# Patient Record
Sex: FEMALE | Race: WHITE | Employment: FULL TIME | URBAN - METROPOLITAN AREA
[De-identification: names, ages, dates, MRNs, and addresses within clinical notes are randomized per-mention and may not be internally consistent; named-entity substitution may affect disease eponyms.]

---

## 2017-01-25 ENCOUNTER — OFFICE VISIT (OUTPATIENT)
Dept: INTERNAL MEDICINE CLINIC | Age: 67
End: 2017-01-25

## 2017-01-25 VITALS
OXYGEN SATURATION: 99 % | SYSTOLIC BLOOD PRESSURE: 134 MMHG | HEIGHT: 63 IN | HEART RATE: 100 BPM | TEMPERATURE: 98 F | WEIGHT: 134 LBS | BODY MASS INDEX: 23.74 KG/M2 | DIASTOLIC BLOOD PRESSURE: 70 MMHG

## 2017-01-25 DIAGNOSIS — J47.9 BRONCHIECTASIS WITHOUT COMPLICATION (HCC): ICD-10-CM

## 2017-01-25 DIAGNOSIS — C50.911 MALIGNANT NEOPLASM OF RIGHT FEMALE BREAST, UNSPECIFIED SITE OF BREAST: ICD-10-CM

## 2017-01-25 DIAGNOSIS — E11.9 CONTROLLED TYPE 2 DIABETES MELLITUS WITHOUT COMPLICATION, WITHOUT LONG-TERM CURRENT USE OF INSULIN (HCC): ICD-10-CM

## 2017-01-25 DIAGNOSIS — Z01.818 PREOP EXAM FOR INTERNAL MEDICINE: ICD-10-CM

## 2017-01-25 DIAGNOSIS — E78.5 HYPERLIPIDEMIA, UNSPECIFIED HYPERLIPIDEMIA TYPE: ICD-10-CM

## 2017-01-25 DIAGNOSIS — J40 BRONCHITIS: Primary | ICD-10-CM

## 2017-01-25 RX ORDER — AZITHROMYCIN 250 MG/1
TABLET, FILM COATED ORAL
Qty: 6 TAB | Refills: 0 | Status: SHIPPED | OUTPATIENT
Start: 2017-01-25 | End: 2017-02-24

## 2017-01-25 NOTE — PROGRESS NOTES
61 y.o. WHITE OR  female who presents for evaluation. No cardiovascular complaints. She has not been going to the gym and is just aiming for 8K-10K on her Fitbit which she hits most days     Denies polyuria, polydipsia, nocturia, vision change. Sugars are in good range when she checks, no hypo episodes. Vitals 1/25/2017 12/13/2016 10/25/2016 8/3/2016 5/10/2016   Weight 134 lb 133 lb 128 lb 9.6 oz 130 lb 130 lb     She had an abnormal mammo late 2016 and US also abn so she subsequently underwent bx late Dec which did confirm breast ca.   She will be undergoing right partial mastectomy by Dr Rodolfo Buchanan mid Feb and she has consult w Dr Silvia Stephen tomorrow for adjuvant xrt    She has had uri sx 2-3 weeks, still coughing up discolored material.  No wheezing, dyspnea, recent f, sinus or ear sx    Past Medical History   Diagnosis Date    ADD (attention deficit disorder) 2014     [facility] psych    Amebic colitis 1970s    Arthritis      s/p cortisone left knee    Bronchiectasis      Dr. Alice Coleman; RLL    FHx: breast cancer     Fibrocystic breast disease      Dr. Joana Ray GERD (gastroesophageal reflux disease)      Dr. Malcom Hartley s/p dilation 2/12    H/O pulmonary function tests 2011     ratio 70, FEV1 81 no change postbd, TLC 93, RV 94, DLCO 81    Hyperlipidemia     Insomnia     Left rotator cuff tear 2000s     conservative therapy    Osteopenia      DEXA t-score -1.2 spine, -0.2 hip (4/08);  spine -1.2, hip 0.1 (10/10);  -1.8 spine, -0.3  (7/13)    Spontaneous pneumothorax 1970s     x3    Type II or unspecified type diabetes mellitus       on basis of elev a1c 9/13    Zoster right S1 3/12     Past Surgical History   Procedure Laterality Date    Pr cardiac surg procedure unlist  4/14     Thallim negative, ef 75%    Hx breast biopsy  4/12     Right Breast biopsy w/needle    Pr breast surgery procedure unlisted  2/11     Left nipple duct exploration and excisional biopsy    Hx colonoscopy        Merle Gilbert negative 2005; diverticulosis 12/13 Dr Kaila Vicente Hx hysterectomy  1993     fibroids/ovarian cysts - Dr. Abhijit Putnam Marital status:      Spouse name: N/A    Number of children: 2    Years of education: N/A     Occupational History   2021 [employer]     Social History Main Topics    Smoking status: Former Smoker     Packs/day: 1.00     Years: 5.00     Quit date: 8/1/1973    Smokeless tobacco: Never Used    Alcohol use No    Drug use: No    Sexual activity: Not on file     Other Topics Concern    Not on file     Social History Narrative     Current Outpatient Prescriptions   Medication Sig    temazepam (RESTORIL) 30 mg capsule TAKE 1 CAPSULE BY MOUTH AT BEDTIME AS NEEDED    metFORMIN ER (GLUCOPHAGE XR) 500 mg tablet Take 2 Tabs by mouth daily (with dinner).  pravastatin (PRAVACHOL) 40 mg tablet Take 1 Tab by mouth nightly.  LORazepam (ATIVAN) 0.5 mg tablet Take  by Mouth.  DEXILANT 60 mg CpDB Take 60 mg by mouth daily.  b complex vitamins (B COMPLEX 1) tablet Take 1 Tab by mouth daily.  Cholecalciferol, Vitamin D3, (VITAMIN D3) 1,000 unit cap Take  by mouth.  aspirin delayed-release 81 mg tablet Take 81 mg by mouth daily.  fluticasone-salmeterol (ADVAIR DISKUS) 250-50 mcg/dose diskus inhaler Take 1 Puff by inhalation every twelve (12) hours.  albuterol (VENTOLIN HFA) 90 mcg/actuation inhaler Take 2 Puffs by inhalation every six (6) hours as needed for Wheezing.  IBUPROFEN (ADVIL PO) Take  by mouth as needed. No current facility-administered medications for this visit.       Allergies   Allergen Reactions    Avelox [Moxifloxacin] Other (comments)     Felt jittery    Crestor [Rosuvastatin] Myalgia    Evista [Raloxifene] Other (comments)     Severe flashes    Lipitor [Atorvastatin] Other (comments)     Leg cramps and muscle aches    Macrobid [Nitrofurantoin Monohyd/M-Cryst] Hives  Neuromuscular Blockers, Steroidal Other (comments)     \"patient feels crazy\"     REVIEW OF SYSTEMS: gyn 2012, mammo 5/16, DEXA 7/13, colo 12/13 Dr Annemarie Nickerson, Dr Taran Luna  Ophtho - no vision change or eye pain  Oral - no mouth pain, tongue or tooth problems  Ears - no hearing loss, ear pain, fullness, no swallowing problems  Cardiac - no CP, PND, orthopnea, edema, palpitations or syncope  Chest - no breast masses  Resp - no wheezing, chronic coughing, dyspnea  GI - no heartburn, nausea, vomiting, change in bowel habits, bleeding, hemorrhoids  Urinary - no dysuria, hematuria, flank pain, urgency, frequency  Constitutional - no wt loss, night sweats, unexplained fevers  Neuro - no focal weakness, numbness, paresthesias, incoordination, ataxia, involuntary movements  Endo - no polyuria, polydipsia, nocturia, hot flashes    Visit Vitals    /70    Pulse 100    Temp 98 °F (36.7 °C) (Oral)    Ht 5' 3\" (1.6 m)    Wt 134 lb (60.8 kg)    SpO2 99%    BMI 23.74 kg/m2   A&O x3  Affect is appropriate. Mood stable  No apparent distress  Anicteric, no JVD, adenopathy or thyromegaly. No carotid bruits or radiated murmur  Lungs clear to auscultation, no wheezes or rales  Heart showed regular rate and rhythm. No murmur, rubs, gallops  Abdomen soft nontender, no hepatosplenomegaly or masses.    Ext without c/c/e, pulses 2+ symmetrically    LABS  From 9/10 showed   gluc 106, cr 0.70, gfr>60, alt 42,                   chol 238, tg 154, hdl 54, ldl-c 153, wbc 6.5, hb 13.1, plt 258, ua neg  From 2/11 showed   gluc 95,   cr 0.90                                                                                                   wbc 7.0, hb 14.1, plt 308  From 3/12 showed   gluc 103, cr 0.90              alt 43,                                                                           wbc 6.2, hb 13.7, plt 285  From 3/13 showed   gluc 130, cr 0.60,             alt 22,                    chol 244, tg 180, hdl 57, ldl-c 151, ua neg, tsh 1.79  From 9/13 showed   gluc 119, cr 0.70, gfr>60,            hba1c 6.6, chol 228, tg 145, hdl 62, ldl-c 137, 2hr   From 2/14 showed   gluc 95,   cr 0.60, gfr>61, alt 20, hba1c 5.7,                    umar 2.8  From 4/14 showed   gluc 144, cr 0.98, gfr>60, alt 31,          wbc 7.6, hb 13.4, plt 317, ck/trop neg  From 10/14 showed gluc 98,   cr 0.60, gfr>60, alt 20,          chol 163, tg 96,   hdl 64, ldl-c 80  From 4/15 showed   gluc 96,   cr 0.70, gfr>60, alt 19, hba1c 6.1, chol 207, tg 126, hdl 68, ldl-c 114  From 10/15 showed        hba1c 6.3, chol 188, tg 118, hdl 66, ldl-c 98,   wbc 5.3, hb 13.3, plt 287, umar neg  From 4/16 showed   gluc 98,   cr 0.60, gfr>60, alt 19, hba1c 6.3, chol 170, tg 141, hdl 57, ldl-c 85  From 10/16 showed gluc 137, cr 0.50, gfr>60, alt 17, hba1c 6.2, chol 176, tg 302, hdl 54, ldl-c 62,   wbc 8.9, hb 12.3, plt 393, umar neg    PREOP   From 1/17 showed   gluc 114, cr 0.60, gfr>60, wbc 6.1, hb 12.9, plt 356  EKG showed nsr, rate 93, rsr', nothing acute    Patient Active Problem List   Diagnosis Code    Bronchiectasis Dr. Jeff Caldera J47.9    Osteopenia 8/13 FRAX 6.3 and 0.2 M85.80    Hyperlipidemia E78.5    Gastroesophageal reflux disease without esophagitis K21.9    Diabetes mellitus type 2, controlled ([address] E11.9    Family history of malignant neoplasm of breast Z80.3     Assessment and plan:  1. Pulm. F/U Dr Joseph Tillman and continue current  2. GERD. Continue ppi and avoidance measures  3. Osteopenia. Ca/d, wt bearing exercises  4. DM. Continue current regimen. lifestyle and dietary measures, wt loss. F/U Dr Karen Bueno, podiatry as needed. 5. Lipids. Continue current regimen. 6  ADD. Off meds  7. Immunizations. Zosta rec along w tdap in future    NEW ISSUES  8. Bronchitis. ZPak given  9. Breast ca. She is felt to be average risk for the planned procedure, no contraindications noted. Routine postop prophylaxis per protocol.  Cover w sliding scale while in house        RTC 4/17    Above conditions discussed at length and patient vocalized understanding.   All questions answered to patient satifaction

## 2017-01-25 NOTE — MR AVS SNAPSHOT
Visit Information Date & Time Provider Department Dept. Phone Encounter #  
 1/25/2017  9:45 AM Damaris Rose MD Internist of [facility] [place] [id number] Your Appointments 4/25/2017  7:55 AM  
LAB with [facility] NURSE VISIT Internist of [facility] ([facility]) Appt Note: lab for rpe rd  
 [address] [facility] [address] [facility] [address], [address] [place]  
  
   
 [address], [address]  
  
    
 5/2/2017  1:00 PM  
PHYSICAL with Damaris Rose MD  
Internist of [facility]) Appt Note: [address]  
 [address] [facility] [address] [facility] [address], [address] [place]  
  
   
 [address], [address] Upcoming Health Maintenance Date Due DTaP/Tdap/Td series (1 - Tdap) 9/28/1973 ZOSTER VACCINE AGE 60> 9/28/2012 EYE EXAM RETINAL OR DILATED Q1 11/11/2014 INFLUENZA AGE 9 TO ADULT 8/1/2016 HEMOGLOBIN A1C Q6M 4/19/2017 FOOT EXAM Q1 4/22/2017 MICROALBUMIN Q1 10/19/2017 LIPID PANEL Q1 10/19/2017 BREAST CANCER SCRN MAMMOGRAM 12/22/2018 COLONOSCOPY 4/22/2026 Allergies as of 1/25/2017  Review Complete On: 12/13/2016 By: Nhan Nix MD  
  
 Severity Noted Reaction Type Reactions Avelox [Moxifloxacin]  01/16/2012    Other (comments) Felt jittery Crestor [Rosuvastatin]  10/23/2014    Myalgia Evista [Raloxifene]    Other (comments) Severe flashes Lipitor [Atorvastatin]  11/07/2013   Side Effect Other (comments) Leg cramps and muscle aches Macrobid [Nitrofurantoin Monohyd/m-cryst]    [employer] Neuromuscular Blockers, Steroidal    Other (comments) \"patient feels crazy\" Current Immunizations  Reviewed on 10/25/2016 Name Date Influenza Vaccine 10/23/2014 Influenza Vaccine (Quad) PF 10/30/2015 Influenza Vaccine PF 10/2/2013  Influenza Vaccine Split 11/28/2012  9:38 AM, 1/16/2012  3:35 PM  
 Influenza Vaccine Whole 9/22/2010 Pneumococcal Conjugate (PCV-13) 10/25/2016 Pneumococcal Vaccine (Unspecified Type) 1/1/2008 TB Skin Test (PPD) 8/1/2009 Not reviewed this visit Vitals BP Pulse Temp Height(growth percentile) Weight(growth percentile) SpO2  
 134/70 100 98 °F (36.7 °C) (Oral) 5' 3\" (1.6 m) 134 lb (60.8 kg) 99% BMI OB Status Smoking Status 23.74 kg/m2 Postmenopausal Former Smoker Vitals History BMI and BSA Data Body Mass Index Body Surface Area  
 23.74 kg/m 2 1.64 m 2 Preferred Pharmacy Pharmacy Name Phone [id number] [address] [facility] [phone] Your Updated Medication List  
  
   
This list is accurate as of: 1/25/17 10:27 AM.  Always use your most recent med list. ADVIL PO Take  by mouth as needed. albuterol 90 mcg/actuation inhaler Commonly known as:  VENTOLIN HFA Take 2 Puffs by inhalation every six (6) hours as needed for Wheezing. aspirin delayed-release 81 mg tablet Take 81 mg by mouth daily. B COMPLEX 1 tablet Generic drug:  b complex vitamins Take 1 Tab by mouth daily. DEXILANT 60 mg Cpdb Generic drug:  Dexlansoprazole Take 60 mg by mouth daily. fluticasone-salmeterol 250-50 mcg/dose diskus inhaler Commonly known as:  ADVAIR DISKUS Take 1 Puff by inhalation every twelve (12) hours. LORazepam 0.5 mg tablet Commonly known as:  ATIVAN Take  by Mouth.  
  
 metFORMIN  mg tablet Commonly known as:  GLUCOPHAGE XR Take 2 Tabs by mouth daily (with dinner). pravastatin 40 mg tablet Commonly known as:  PRAVACHOL Take 1 Tab by mouth nightly. temazepam 30 mg capsule Commonly known as:  RESTORIL  
TAKE 1 CAPSULE BY MOUTH AT BEDTIME AS NEEDED  
  
 VITAMIN D3 1,000 unit Cap Generic drug:  cholecalciferol Take  by mouth.   
  
  
  
  
To-Do List   
 01/26/2017 1:30 PM  
 Appointment with [facility] RADIATION [facility] at [facility] ([phone]) This appointment time is simply a place pelayo and may not reflect the true appointment time. If patient does not know the appointment time given to them at the time of scheduling, they should contact the Radiation Therapy department for detail. Introducing [facility]! Dear Miguelina Ibanez: Thank you for requesting a [employer] account. Our records indicate that you already have an active [employer] account. You can access your account anytime at [email or web address] Did you know that you can access your hospital and ER discharge instructions at any time in [employer]? You can also review all of your test results from your hospital stay or ER visit. Additional Information If you have questions, please visit the Frequently Asked Questions section of the [employer] website at [email or web address] Remember, [employer] is NOT to be used for urgent needs. For medical emergencies, dial 911. Now available from your iPhone and Android! Please provide this summary of care documentation to your next provider. Your primary care clinician is listed as Wenceslao Closs. If you have any questions after today's visit, please call [phone].

## 2017-01-25 NOTE — PROGRESS NOTES
1. Have you been to the ER, urgent care clinic or hospitalized since your last visit? NO.     2. Have you seen or consulted any other health care providers outside of the [address] [place] [address] since your last visit (Include any pap smears or colon screening)? NO      Do you have an Advanced Directive? NO    Would you like information on Advanced Directives?  YES

## 2017-01-26 ENCOUNTER — HOSPITAL ENCOUNTER (OUTPATIENT)
Dept: RADIATION THERAPY | Age: 67
Discharge: HOME | End: 2017-01-26
Payer: COMMERCIAL

## 2017-01-26 PROCEDURE — 99211 OFF/OP EST MAY X REQ PHY/QHP: CPT

## 2017-02-22 ENCOUNTER — TELEPHONE (OUTPATIENT)
Dept: INTERNAL MEDICINE CLINIC | Age: 67
End: 2017-02-22

## 2017-02-22 RX ORDER — TEMAZEPAM 30 MG/1
CAPSULE ORAL
Qty: 30 CAP | Refills: 1 | OUTPATIENT
Start: 2017-02-22 | End: 2017-04-25 | Stop reason: SDUPTHER

## 2017-02-22 NOTE — TELEPHONE ENCOUNTER
PT fell 11 days ago- Went to [facility]- arm is sprained-  Still in a lot of pain- scheduled 2/24/17 with [facility]

## 2017-02-24 ENCOUNTER — OFFICE VISIT (OUTPATIENT)
Dept: INTERNAL MEDICINE CLINIC | Age: 67
End: 2017-02-24

## 2017-02-24 VITALS
WEIGHT: 139 LBS | OXYGEN SATURATION: 98 % | HEART RATE: 109 BPM | HEIGHT: 63 IN | SYSTOLIC BLOOD PRESSURE: 142 MMHG | DIASTOLIC BLOOD PRESSURE: 80 MMHG | TEMPERATURE: 97.9 F | BODY MASS INDEX: 24.63 KG/M2

## 2017-02-24 DIAGNOSIS — S43.402A SPRAIN SHOULDER/ARM, LEFT, INITIAL ENCOUNTER: Primary | ICD-10-CM

## 2017-02-24 NOTE — PROGRESS NOTES
HPI/History  Marlyce Shone is a 61 y.o.  female who presents for evaluation. Pt suffered mechanical fall injuring left distal arm. [address]  ED on 2/12 where XRs were negative and dx with sprain. Pt using splint/brace. Discomfort slightly improved but still present and limiting full function due to pain. No other developments. No other sxs or complaints.      Patient Active Problem List   Diagnosis Code    Bronchiectasis Dr. Cici Fuchs J47.9    Osteopenia 8/13 FRAX 6.3 and 0.2 M85.80    Hyperlipidemia E78.5    Gastroesophageal reflux disease without esophagitis K21.9    Diabetes mellitus type 2, controlled ([facility] E11.9    Family history of malignant neoplasm of breast Z80.3    Malignant neoplasm of right female breast ([facility] C50.911     Past Medical History:   Diagnosis Date    ADD (attention deficit disorder) 2014    [facility] psych    Amebic colitis 1970s    Arthritis     s/p cortisone left knee    Bronchiectasis     Dr. Calderon Zuniga; RLL    FHx: breast cancer     Fibrocystic breast disease     Dr. Kevin Cuellar GERD (gastroesophageal reflux disease)     Dr. Christian Yang s/p dilation 2/12    H/O pulmonary function tests 2011    ratio 70, FEV1 81 no change postbd, TLC 93, RV 94, DLCO 81    Hyperlipidemia     Insomnia     Left rotator cuff tear 2000s    conservative therapy    Osteopenia     DEXA t-score -1.2 spine, -0.2 hip (4/08);  spine -1.2, hip 0.1 (10/10);  -1.8 spine, -0.3  (7/13)    Spontaneous pneumothorax 1970s    x3    Type II or unspecified type diabetes mellitus      on basis of elev a1c 9/13    Zoster right S1 3/12     Past Surgical History:   Procedure Laterality Date    BREAST SURGERY PROCEDURE UNLISTED  2/11    Left nipple duct exploration and excisional biopsy    CARDIAC SURG PROCEDURE UNLIST  4/14    Thallim negative, ef 75%    HX BREAST BIOPSY  4/12    Right Breast biopsy w/needle    HX COLONOSCOPY      Dr. Christian Yang negative 2005; diverticulosis 12/13 Dr Corie Cardoso   [facility] HYSTERECTOMY  1993    fibroids/ovarian cysts - Dr. Abhijit Putnam Marital status:      Spouse name: N/A    Number of children: 2    Years of education: N/A     Occupational History   2021 [employer]     Social History Main Topics    Smoking status: Former Smoker     Packs/day: 1.00     Years: 5.00     Quit date: 8/1/1973    Smokeless tobacco: Never Used    Alcohol use No    Drug use: No    Sexual activity: Not on file     Other Topics Concern    Not on file     Social History Narrative     Family History   Problem Relation Age of Onset    Breast Cancer Mother     Ovarian Cancer Mother     Diabetes Father     Stroke Father     Diabetes Sister     Breast Cancer Maternal Grandmother     Breast Cancer Paternal Grandmother     Breast Cancer Paternal Aunt      Current Outpatient Prescriptions   Medication Sig    temazepam (RESTORIL) 30 mg capsule TAKE 1 CAPSULE BY MOUTH AT BEDTIME AS NEEDED    metFORMIN ER (GLUCOPHAGE XR) 500 mg tablet Take 2 Tabs by mouth daily (with dinner).  pravastatin (PRAVACHOL) 40 mg tablet Take 1 Tab by mouth nightly.  DEXILANT 60 mg CpDB Take 60 mg by mouth daily.  b complex vitamins (B COMPLEX 1) tablet Take 1 Tab by mouth daily.  Cholecalciferol, Vitamin D3, (VITAMIN D3) 1,000 unit cap Take  by mouth.  fluticasone-salmeterol (ADVAIR DISKUS) 250-50 mcg/dose diskus inhaler Take 1 Puff by inhalation every twelve (12) hours.  albuterol (VENTOLIN HFA) 90 mcg/actuation inhaler Take 2 Puffs by inhalation every six (6) hours as needed for Wheezing.  IBUPROFEN (ADVIL PO) Take  by mouth as needed. No current facility-administered medications for this visit.       Allergies   Allergen Reactions    Avelox [Moxifloxacin] Other (comments)     Felt jittery    Crestor [Rosuvastatin] Myalgia    Evista [Raloxifene] Other (comments)     Severe flashes    Lipitor [Atorvastatin] Other (comments)     Leg cramps and muscle aches    Macrobid [Nitrofurantoin Monohyd/M-Cryst] Hives    Neuromuscular Blockers, Steroidal Other (comments)     \"patient feels crazy\"       Review of Systems  Denies hx of bleeds/ulcers. Gerd controlled with dexilant. Significant findings included in HPI. Physical Examination  Visit Vitals    /80    Pulse (!) 109    Temp 97.9 °F (36.6 °C) (Oral)    Ht 5' 3\" (1.6 m)    Wt 139 lb (63 kg)    SpO2 98%    BMI 24.62 kg/m2       General - Alert and in no acute distress. Pt appears well, comfortable, and in good spirits. Nontoxic. Not anxious, non-diaphoretic. Mental status - Appropriate mood, behavior, speech content, dress, and thought processes. Pulm - No tachypnea, retractions, or cyanosis. Good respiratory effort. Splint in place. Left distal forearm indicated as affected area, mostly radial aspect. No swelling/edema. No bruising or other discoloration. No warmth. No significant tenderness; no palpable or visible deformities. Discomfort mostly with pronation and supination. Neurovascularly intact. Assessment and Plan  1. Left arm sprain - [facility] XRs negative. Pt using splint/brace. Improving slowly. Discussed options, pt wishes to incorporate rest, ice, splinting, elevation, and observation. She will start buffered ibuprofen she has at home with food and continue dexilant. Return/call if needed. May consider re-imaging or ortho eval in future but will base off pt progression. Discussed course and prognosis. Pt happily agrees with plan. PLEASE NOTE:   This document has been produced using voice recognition software. Unrecognized errors in transcription may be present.     [facility]  [phone]  2/24/2017

## 2017-02-24 NOTE — MR AVS SNAPSHOT
Visit Information Date & Time Provider Department Dept. Phone Encounter #  
 2/24/2017 12:30 PM Disha Meza, [address] Internist of [address] [place] [address] Place [id number] Your Appointments 3/7/2017  9:40 AM  
New Patient with Marija Gant MD  
[address] [facility], [address] and [facility] - [facility] ([address]) Appt Note: LEFT ARM/WRIST PAIN/OPTIMA HMO/PT ADV TO BRING XRAY/SELF REF/AWR LOC/CK IN 9:10  
 [address], [address] [facility]  
[phone] [address] [place] [address]  
  
    
 4/25/2017  7:55 AM  
LAB with [facility] NURSE VISIT Internist of [facility] ([facility]) Appt Note: lab for rpe rd  
 [address], [address] Alysia Copas [address]  
  
   
 [address]  
  
    
 5/2/2017  1:00 PM  
PHYSICAL with Darlin Álvarez MD  
Internist of [address]) Appt Note: [address]  
 [address], [address] Alysia Copas [address]  
  
   
 [address] Upcoming Health Maintenance Date Due DTaP/Tdap/Td series (1 - Tdap) 9/28/1973 ZOSTER VACCINE AGE 60> 9/28/2012 EYE EXAM RETINAL OR DILATED Q1 11/11/2014 Pneumococcal 19-64 Highest Risk (3 of 3 - PPSV23) 12/20/2016 HEMOGLOBIN A1C Q6M 4/19/2017 FOOT EXAM Q1 4/22/2017 MICROALBUMIN Q1 10/19/2017 LIPID PANEL Q1 10/19/2017 BREAST CANCER SCRN MAMMOGRAM 12/22/2018 COLONOSCOPY 4/22/2026 Allergies as of 2/24/2017  Review Complete On: 2/24/2017 By: PAPITO Goldstein Severity Noted Reaction Type Reactions Avelox [Moxifloxacin]  01/16/2012    Other (comments) Felt jittery Crestor [Rosuvastatin]  10/23/2014    Myalgia Evista [Raloxifene]    Other (comments) Severe flashes Lipitor [Atorvastatin]  11/07/2013   Side Effect Other (comments) Leg cramps and muscle aches Macrobid [Nitrofurantoin Monohyd/m-cryst]    [employer] Neuromuscular Blockers, Steroidal    Other (comments) \"patient feels crazy\" Current Immunizations  Reviewed on 10/25/2016 Name Date Influenza Vaccine 10/23/2014 Influenza Vaccine (Quad) PF 10/30/2015 Influenza Vaccine PF 10/2/2013 Influenza Vaccine Split 11/28/2012  9:38 AM, 1/16/2012  3:35 PM  
 Influenza Vaccine Whole 9/22/2010 Pneumococcal Conjugate (PCV-13) 10/25/2016 Pneumococcal Vaccine (Unspecified Type) 1/1/2008 TB Skin Test (PPD) 8/1/2009 Not reviewed this visit Vitals BP  
  
  
  
  
  
 142/80 Vitals History BMI and BSA Data Body Mass Index Body Surface Area  
 24.62 kg/m 2 1.67 m 2 Preferred Pharmacy Pharmacy Name Phone [id number] Lesa Davila, [facility],[address] [facility] [phone] Your Updated Medication List  
  
   
This list is accurate as of: 2/24/17 12:35 PM.  Always use your most recent med list. ADVIL PO Take  by mouth as needed. albuterol 90 mcg/actuation inhaler Commonly known as:  VENTOLIN HFA Take 2 Puffs by inhalation every six (6) hours as needed for Wheezing. B COMPLEX 1 tablet Generic drug:  b complex vitamins Take 1 Tab by mouth daily. DEXILANT 60 mg Cpdb Generic drug:  Dexlansoprazole Take 60 mg by mouth daily. fluticasone-salmeterol 250-50 mcg/dose diskus inhaler Commonly known as:  ADVAIR DISKUS Take 1 Puff by inhalation every twelve (12) hours. metFORMIN  mg tablet Commonly known as:  GLUCOPHAGE XR Take 2 Tabs by mouth daily (with dinner). pravastatin 40 mg tablet Commonly known as:  PRAVACHOL Take 1 Tab by mouth nightly. temazepam 30 mg capsule Commonly known as:  RESTORIL  
TAKE 1 CAPSULE BY MOUTH AT BEDTIME AS NEEDED  
  
 VITAMIN D3 1,000 unit Cap Generic drug:  cholecalciferol Take  by mouth.   
  
  
  
  
To-Do List   
 03/08/2017 8:00 AM  
  Appointment with [facility] RADIATION ONCOLOGY at [facility] RADIATION THERAPY ([phone]) This appointment time is simply a place pelayo and may not reflect the true appointment time. If patient does not know the appointment time given to them at the time of scheduling, they should contact the Radiation Therapy department for detail. Introducing [facility] SERVICES! Dear Aquilino Gonzales: Thank you for requesting a [employer] account. Our records indicate that you already have an active [employer] account. You can access your account anytime at [email or web address]. [employer] Did you know that you can access your hospital and ER discharge instructions at any time in [employer]? You can also review all of your test results from your hospital stay or ER visit. Additional Information If you have questions, please visit the Frequently Asked Questions section of the [employer] website at [email or web address] Remember, [employer] is NOT to be used for urgent needs. For medical emergencies, dial 911. Now available from your iPhone and Android! Please provide this summary of care documentation to your next provider. Your primary care clinician is listed as Josephine Hummel. If you have any questions after today's visit, please call [phone].

## 2017-02-27 ENCOUNTER — OFFICE VISIT (OUTPATIENT)
Dept: ORTHOPEDIC SURGERY | Age: 67
End: 2017-02-27

## 2017-02-27 VITALS
HEART RATE: 101 BPM | HEIGHT: 63 IN | DIASTOLIC BLOOD PRESSURE: 75 MMHG | SYSTOLIC BLOOD PRESSURE: 155 MMHG | BODY MASS INDEX: 24.38 KG/M2 | TEMPERATURE: 96.8 F | WEIGHT: 137.6 LBS

## 2017-02-27 DIAGNOSIS — S62.102A LEFT WRIST FRACTURE, CLOSED, INITIAL ENCOUNTER: ICD-10-CM

## 2017-02-27 DIAGNOSIS — M25.532 LEFT WRIST PAIN: Primary | ICD-10-CM

## 2017-02-27 DIAGNOSIS — M75.02 ADHESIVE CAPSULITIS OF LEFT SHOULDER: ICD-10-CM

## 2017-02-27 RX ORDER — HYDROCODONE BITARTRATE AND ACETAMINOPHEN 5; 325 MG/1; MG/1
1-2 TABLET ORAL
Qty: 40 TAB | Refills: 0 | Status: SHIPPED | OUTPATIENT
Start: 2017-02-27 | End: 2018-01-26 | Stop reason: ALTCHOICE

## 2017-02-27 NOTE — PROGRESS NOTES
Chief Complaint   Patient presents with    Arm Pain     Left    Wrist Pain     Left     3/10 pain.

## 2017-02-27 NOTE — PROGRESS NOTES
Deven Roth  1950   Chief Complaint   Patient presents with    Arm Pain     Left    Wrist Pain     Left        HISTORY OF PRESENT ILLNESS  Deven Roth is a 61 y.o. female who presents today for evaluation of left arm/wrist pain. She rates her pain 3/10 today. She states she went to step off of a curb while wearing heels when she fell 2 weeks ago. She states a soreness in her wrist that radiates toward the elbow. She also complains of stiffness. She states she has trouble sleeping at night due to the pain.     Allergies   Allergen Reactions    Avelox [Moxifloxacin] Other (comments)     Felt jittery    Crestor [Rosuvastatin] Myalgia    Evista [Raloxifene] Other (comments)     Severe flashes    Lipitor [Atorvastatin] Other (comments)     Leg cramps and muscle aches    Macrobid [Nitrofurantoin Monohyd/M-Cryst] Hives    Neuromuscular Blockers, Steroidal Other (comments)     \"patient feels crazy\"        Past Medical History:   Diagnosis Date    ADD (attention deficit disorder) 2014    [facility] 3[facility] psych    Amebic colitis 1970s    Arthritis     s/p cortisone left knee    Bronchiectasis     Dr. Siomara Blancas; [facility]    FHx: breast cancer     Fibrocystic breast disease     Dr. Xochitl Moctezuma GERD (gastroesophageal reflux disease)     Dr. Palak Avila s/p dilation 2/12    H/O pulmonary function tests 2011    ratio 70, FEV1 81 no change postbd, TLC 93, RV 94, DLCO 81    Hyperlipidemia     Insomnia     Left rotator cuff tear 2000s    conservative therapy    Osteopenia     DEXA t-score -1.2 spine, -0.2 hip (4/08);  spine -1.2, hip 0.1 (10/10);  -1.8 spine, -0.3  (7/13)    Spontaneous pneumothorax 1970s    x3    Type II or unspecified type diabetes mellitus      on basis of elev a1c 9/13    Zoster right S1 3/12      Social History     Social History    Marital status:      Spouse name: N/A    Number of children: 2    Years of education: N/A     Occupational History   [address] [employer] [address] [employer]     Social History Main Topics    Smoking status: Former Smoker     Packs/day: 1.00     Years: 5.00     Quit date: 8/1/1973    Smokeless tobacco: Never Used    Alcohol use No    Drug use: No    Sexual activity: Not on file     Other Topics Concern    Not on file     Social History Narrative      Past Surgical History:   Procedure Laterality Date    BREAST SURGERY PROCEDURE UNLISTED  2/11    Left nipple duct exploration and excisional biopsy    CARDIAC SURG PROCEDURE UNLIST  4/14    Thallim negative, ef 75%    HX BREAST BIOPSY  4/12    Right Breast biopsy w/needle    HX COLONOSCOPY      Dr. Edgar Dillard negative 2005; diverticulosis 12/13 Dr Melanie Jaramillo    fibroids/ovarian cysts - Dr. Ernesto Richardson      Family History   Problem Relation Age of Onset    Breast Cancer Mother     Ovarian Cancer Mother     Diabetes Father     Stroke Father     Diabetes Sister     Breast Cancer Maternal Grandmother     Breast Cancer Paternal Grandmother     Breast Cancer Paternal Aunt       Current Outpatient Prescriptions   Medication Sig    metFORMIN ER (GLUCOPHAGE XR) 500 mg tablet Take 2 Tabs by mouth daily (with dinner).  pravastatin (PRAVACHOL) 40 mg tablet Take 1 Tab by mouth nightly.  DEXILANT 60 mg CpDB Take 60 mg by mouth daily.  b complex vitamins (B COMPLEX 1) tablet Take 1 Tab by mouth daily.  Cholecalciferol, Vitamin D3, (VITAMIN D3) 1,000 unit cap Take  by mouth.  fluticasone-salmeterol (ADVAIR DISKUS) 250-50 mcg/dose diskus inhaler Take 1 Puff by inhalation every twelve (12) hours.  albuterol (VENTOLIN HFA) 90 mcg/actuation inhaler Take 2 Puffs by inhalation every six (6) hours as needed for Wheezing.  IBUPROFEN (ADVIL PO) Take  by mouth as needed.  temazepam (RESTORIL) 30 mg capsule TAKE 1 CAPSULE BY MOUTH AT BEDTIME AS NEEDED     No current facility-administered medications for this visit.         REVIEW OF SYSTEM   Patient denies: Weight loss, Fever/Chills, HA, Visual changes, Fatigue, Chest pain, SOB, Abdominal pain, N/V/D/C, Blood in stool or urine, Edema. Pertinent positive as above in HPI. All others were negative    PHYSICAL EXAM:   Visit Vitals    /75    Pulse (!) 101    Temp 96.8 °F (36 °C) (Oral)    Ht 5' 3\" (1.6 m)    Wt 137 lb 9.6 oz (62.4 kg)    BMI 24.37 kg/m2     The patient is a well-developed, well-nourished female   in no acute distress. The patient is alert and oriented times three. The patient is alert and oriented times three. Mood and affect are normal.  LYMPHATIC: lymph nodes are not enlarged and are within normal limits  SKIN: normal in color and non tender to palpation. There are no bruises or abrasions noted. NEUROLOGICAL: Motor sensory exam is within normal limits. Reflexes are equal bilaterally. There is normal sensation to pinprick and light touch  MUSCULOSKELETAL:  Left wrist: Diffuse swelling and tenderness over the distal radius. Glenohumeral OA is 70    RADIOGRAPHS/X-RAYS: XR of the left wrist dated 2/27/17 was reviewed and read: Possible subtle loosened line in the distal radius. IMPRESSION:      ICD-10-CM ICD-9-CM    1. Left wrist pain M25.532 719.43 AMB POC X-RAY HAND 2 VW        PLAN: The patient's left wrist was casted today. I instructed her to work on mobility of the arm and shoulder. She will also be referred to physical therapy to work on mobility of her left shoulder. I also gave her a prescription for Norco. She will return in 2 weeks for reevaluation.   Follow-up Disposition: Not on File    Scribed by Isha Go [facility]) as dictated by MD Dsehawn Dickey M.D.   [facility]

## 2017-03-03 ENCOUNTER — HOSPITAL ENCOUNTER (OUTPATIENT)
Dept: PHYSICAL THERAPY | Age: 67
Discharge: HOME | End: 2017-03-03
Payer: COMMERCIAL

## 2017-03-03 PROCEDURE — 97110 THERAPEUTIC EXERCISES: CPT

## 2017-03-03 PROCEDURE — 97162 PT EVAL MOD COMPLEX 30 MIN: CPT

## 2017-03-03 NOTE — PROGRESS NOTES
PT DAILY TREATMENT NOTE     Patient Name: Bell Jmienez  Date:3/3/2017  : 1950  [x]  Patient  Verified  Payor: [address] [place] [address] Avenue / Plan: [employer] / Product Type: HMO /    In time:805  Out time:840  Total Treatment Time (min): 35  Visit #: 1 of 8    Treatment Area: Adhesive capsulitis of left shoulder [M75.02]    SUBJECTIVE  Pain Level (0-10 scale):  2  Any medication changes, allergies to medications, adverse drug reactions, diagnosis change, or new procedure performed?: [x] No    [] Yes (see summary sheet for update)  Subjective functional status/changes:   [] No changes reported      OBJECTIVE    Modality rationale:    Min Type Additional Details    [] Estim:  []Unatt       []IFC  []Premod                        []Other:  []w/ice   []w/heat  Position:  Location:    [] Estim: []Att    []TENS instruct  []NMES                    []Other:  []w/US   []w/ice   []w/heat  Position:  Location:    []  Traction: [] Cervical       []Lumbar                       [] Prone          []Supine                       []Intermittent   []Continuous Lbs:  [] before manual  [] after manual    []  Ultrasound: []Continuous   [] Pulsed                           []1MHz   []3MHz W/cm2:  Location:    []  Iontophoresis with dexamethasone         Location: [] Take home patch   [] In clinic    []  Ice     []  heat  []  Ice massage  []  Laser   []  Anodyne Position:  Location:    []  Laser with stim  []  Other:  Position:  Location:    []  Vasopneumatic Device Pressure:       [] lo [] med [] hi   Temperature: [] lo [] med [] hi   [] Skin assessment post-treatment:  []intact []redness- no adverse reaction    []redness  adverse reaction:     27 min [x]Eval                  []Re-Eval       8 min Therapeutic Exercise:  [] See flow sheet :HEP   Rationale: increase ROM and increase strength to improve the patients ability to perform ADL          With   [] TE   [] TA   [] neuro   [] other: Patient Education: [x] Review HEP    [] Progressed/Changed HEP based on:   [] positioning   [] body mechanics   [] transfers   [] heat/ice application    [] other:      Other Objective/Functional Measures:      Pain Level (0-10 scale) post treatment: 2    ASSESSMENT/Changes in Function:     Patient will continue to benefit from skilled PT services to modify and progress therapeutic interventions, address functional mobility deficits, address ROM deficits, address strength deficits, analyze and address soft tissue restrictions, analyze and cue movement patterns, analyze and modify body mechanics/ergonomics, assess and modify postural abnormalities and instruct in home and community integration to attain remaining goals.      [x]  See Plan of Care  []  See progress note/recertification  []  See Discharge Summary         Progress towards goals / Updated goals:  See POC    PLAN  []  Upgrade activities as tolerated     [x]  Continue plan of care  []  Update interventions per flow sheet       []  Discharge due to:_  []  Other:_      Ban Ravi, PT 3/3/2017  8:42 AM    Future Appointments  Date Time Provider Grant Carrilloi   3/7/2017 5:00 PM Diane Calzada, PT [facility]   3/8/2017 8:00 AM [facility] RADIATION ONCOLOGY [facility]   3/13/2017 4:20 PM [place] [id number]   4/25/2017 7:55 AM IOC NURSE VISIT [facility]   5/2/2017 1:00 PM Matthew Faulkner MD [facility]

## 2017-03-03 NOTE — PROGRESS NOTES
[facility]  [phone] fax  Plan of Care/ Statement of Necessity for Physical Therapy Services     Patient name: Moncho Caldera Start of Care: 3/3/2017   Referral source: Haleyabbe May,* : 1950    Medical Diagnosis: Adhesive capsulitis of left shoulder [M75.02]   Onset Date:17    Treatment Diagnosis: Left shoulder pain   Prior Hospitalization: see medical history Provider#: [id number]   Medications: Verified on Patient summary List    Comorbidities: diabetes, allergies, prior surgery (lumpectomy)   Prior Level of Function: Functionally independent, lives with ,  at [address] [place] [address] and following information is based on the information from the initial evaluation. Assessment/ key information: Patient is a pleasant 61year old female who presents with complaints of Left UE pain following a fall on 17. Patient states that she slipped off of a curb and reached out with her Left UE to block her fall, with immediate wrist pain resulting. Initial X-Rays revealed no fracture but over the following two weeks Patient felt continued significant pain, leading to PCP follow up. X-Rays taken of a larger area then revealed a wrist fracture and she was placed in a cast from her wrist to her elbow. Due to continued pain prior to her cast Patient reports holding her Left UE with increased tension, resulting in growing shoulder pain. Due to her pain and casting Patient reports difficulty with daily tasks at home and work, and requires assistance from her  for some ADL. At initial evaluation Patient demonstrates the following shoulder AROM: Right WNL, Left flexion 95, abduction 65, extension WNL, IR 55 (in supine at 45 degrees abd), ER 40 (in supine at 45 degrees abd). Left elbow extension AROM limited to lacking 20 degrees.  Left UE strength grossly 3-/5 (shoulder extension 5/5). Tender to palpation in the Left upper trapezius, posterior shoulder/RTC, and biceps origin. Patient is a good rehab candidate based on premorbid status and will benefit from skilled physical therapy in order to improve Left UE strength and mobility for return to premorbid activity level. Evaluation Complexity History LOW Complexity : Zero comorbidities / personal factors that will impact the outcome / POC; Examination LOW Complexity : 1-2 Standardized tests and measures addressing body structure, function, activity limitation and / or participation in recreation  ;Presentation LOW Complexity : Stable, uncomplicated  ;Clinical Decision Making MEDIUM Complexity : FOTO score of 26-74  Overall Complexity Rating: MEDIUM  Problem List: pain affecting function, decrease ROM, decrease strength, edema affecting function, impaired gait/ balance, decrease ADL/ functional abilitiies, decrease activity tolerance, decrease flexibility/ joint mobility and decrease transfer abilities   Treatment Plan may include any combination of the following: Therapeutic exercise, Therapeutic activities, Neuromuscular re-education, Physical agent/modality, Gait/balance training, Manual therapy, Aquatic therapy, Patient education, Self Care training, Functional mobility training, Home safety training and Stair training  Patient / Family readiness to learn indicated by: asking questions, trying to perform skills and interest  Persons(s) to be included in education: patient (P)  Barriers to Learning/Limitations: None  Patient Goal (s): mobility  Patient Self Reported Health Status: good  Rehabilitation Potential: good    Short Term Goals: To be accomplished in 1 weeks:  Goal: Patient will be independent and compliant with HEP in order to progress toward long term goals. Status at last note/certification: Issued and reviewed  Long Term Goals:  To be accomplished in 4 weeks:  Goal: Patient will improve FOTO assessment score to 65 in order to indicate improved functional abilities. Status at last note/certification: 39  Goal: Patient will improve Left elbow extension AROM to full in order to improve ease of job tasks. Status at last note/certification: Lacking 20 degrees  Goal: Patient will improve Left shoulder flexion/abduction AROM in sitting to at least 130 degrees in order to improve ease of reaching and lifting tasks. Status at last note/certification: flexion 95, abduction 65  Goal: Patient will improve Left shoulder internal and external rotation AROM in supine to at least 70 degrees in order to improve ease of ADL. Status at last note/certification: internal rotation 55, external rotation 40    Frequency / Duration: Patient to be seen 2 times per week for 4 weeks. Patient/ Caregiver education and instruction: Diagnosis, prognosis, exercises   [x]  Plan of care has been reviewed with LUCAS Rodriguez, PT 3/3/2017 8:43 AM  _____________________________________________________________________  I certify that the above Therapy Services are being furnished while the patient is under my care. I agree with the treatment plan and certify that this therapy is necessary.     Physician's Signature:____________________  Date:__________Time:______    Please sign and return to [facility] MARIBEL DAMON [address]  [phone] fax

## 2017-03-07 ENCOUNTER — HOSPITAL ENCOUNTER (OUTPATIENT)
Dept: PHYSICAL THERAPY | Age: 67
Discharge: HOME | End: 2017-03-07
Payer: COMMERCIAL

## 2017-03-07 PROCEDURE — 97110 THERAPEUTIC EXERCISES: CPT

## 2017-03-07 PROCEDURE — 97140 MANUAL THERAPY 1/> REGIONS: CPT

## 2017-03-07 NOTE — PROGRESS NOTES
PT DAILY TREATMENT NOTE     Patient Name: Matthew Moctezuma  Date:3/7/2017  : 1950  [x]  Patient  Verified  Payor: Marnie Prado / Plan: [address] [place] [address] [employer] / Product Type: HMO /    In time:5:00  Out time:5:50  Total Treatment Time (min): 50  Visit #: 2 of 8    Treatment Area: Adhesive capsulitis of left shoulder [M75.02]    SUBJECTIVE  Pain Level (0-10 scale): 2/10  Any medication changes, allergies to medications, adverse drug reactions, diagnosis change, or new procedure performed?: [x] No    [] Yes (see summary sheet for update)  Subjective functional status/changes:   [] No changes reported  \"The shoulder and elbow feel better. It's the wrist that is still bothersome. I go see the MD on 3/13/17 and he will check to see if I still need to wear the cast.\"    OBJECTIVE    30 min Therapeutic Exercise:  [] See flow sheet :   Rationale: increase ROM and increase strength to improve the patients ability to dress, groom with ease    20 min Manual Therapy:  Gentle distraction; Inf/Post GHJ glides; STM L deltoid, bicep; S/L scapular mobs, distraction   Rationale: decrease pain, increase tissue extensibility and decrease trigger points to increase L shoulder AROM without discomfort          With   [] TE   [] TA   [] neuro   [] other: Patient Education: [x] Review HEP    [] Progressed/Changed HEP based on:   [] positioning   [] body mechanics   [] transfers   [] heat/ice application    [] other:      Other Objective/Functional Measures: Initiated Rx program per flow sheet. Pt provided VCs for proper technique of all exercises. Pt noted slight increase in soreness after exercises. Pain Level (0-10 scale) post treatment: 210    ASSESSMENT/Changes in Function: Pt able to exhibit decrease in soft tissue restrictions and improved mobility after manual therapy. Pt noted no change in pain levels. Pt advised to use cold pack if soreness began after session or to use heat if only felt stiff.     Patient will continue to benefit from skilled PT services to address functional mobility deficits, address ROM deficits, address strength deficits, analyze and address soft tissue restrictions, analyze and cue movement patterns, analyze and modify body mechanics/ergonomics, assess and modify postural abnormalities and instruct in home and community integration to attain remaining goals. []  See Plan of Care  []  See progress note/recertification  []  See Discharge Summary         Progress towards goals / Updated goals:  Short Term Goals: To be accomplished in 1 weeks:  Goal: Patient will be independent and compliant with HEP in order to progress toward long term goals. Status at last note/certification: Issued and reviewed  Current status: met  Long Term Goals: To be accomplished in 4 weeks:  Goal: Patient will improve FOTO assessment score to 65 in order to indicate improved functional abilities. Status at last note/certification: 39  Goal: Patient will improve Left elbow extension AROM to full in order to improve ease of job tasks. Status at last note/certification: Lacking 20 degrees  Goal: Patient will improve Left shoulder flexion/abduction AROM in sitting to at least 130 degrees in order to improve ease of reaching and lifting tasks. Status at last note/certification: flexion 95, abduction 65  Goal: Patient will improve Left shoulder internal and external rotation AROM in supine to at least 70 degrees in order to improve ease of ADL.   Status at last note/certification: internal rotation 55, external rotation 40    PLAN  []  Upgrade activities as tolerated     [x]  Continue plan of care  []  Update interventions per flow sheet       []  Discharge due to:_  []  Other:_      Jose Holman, PT 3/7/2017  5:10 PM    Future Appointments  Date Time Provider Grant Natarajan   3/8/2017 8:00 AM [facility]COL[facility]   3/13/2017 4:20 PM MD Scot Urrutia    4/25/2017 7:55 AM [facility] NURSE VISIT [facility]   5/2/2017 1:00 PM Maria Elena Wang MD [facility]

## 2017-03-08 ENCOUNTER — HOSPITAL ENCOUNTER (OUTPATIENT)
Dept: RADIATION THERAPY | Age: 67
Discharge: HOME | End: 2017-03-08
Payer: COMMERCIAL

## 2017-03-08 PROCEDURE — 77290 THER RAD SIMULAJ FIELD CPLX: CPT

## 2017-03-08 PROCEDURE — 77332 RADIATION TREATMENT AID(S): CPT

## 2017-03-09 ENCOUNTER — HOSPITAL ENCOUNTER (OUTPATIENT)
Dept: RADIATION THERAPY | Age: 67
Discharge: HOME | End: 2017-03-09
Payer: COMMERCIAL

## 2017-03-09 ENCOUNTER — HOSPITAL ENCOUNTER (OUTPATIENT)
Dept: PHYSICAL THERAPY | Age: 67
Discharge: HOME | End: 2017-03-09
Payer: COMMERCIAL

## 2017-03-09 PROCEDURE — 97110 THERAPEUTIC EXERCISES: CPT

## 2017-03-09 PROCEDURE — 97140 MANUAL THERAPY 1/> REGIONS: CPT

## 2017-03-09 NOTE — PROGRESS NOTES
PT DAILY TREATMENT NOTE     Patient Name: Gera John  Date:3/9/2017  : 1950  [x]  Patient  Verified  Payor: Allegra Quinn / Plan: [employer] HMO / Product Type: HMO /    In time:5:30  Out time:6:10  Total Treatment Time (min): 40  Visit #: 3 of 8    Treatment Area: Adhesive capsulitis of left shoulder [M75.02]    SUBJECTIVE  Pain Level (0-10 scale): 1/10  Any medication changes, allergies to medications, adverse drug reactions, diagnosis change, or new procedure performed?: [x] No    [] Yes (see summary sheet for update)  Subjective functional status/changes:   [] No changes reported  \"I felt like I had a workout after last time but it really helped to loosen up the shoulder so much. I can already tell a difference. \"    OBJECTIVE    25 min Therapeutic Exercise:  [] See flow sheet :   Rationale: increase ROM and increase strength to improve the patients ability to dress, groom with ease    15 min Manual Therapy:  Gentle distraction; Inf/Post GHJ glides; S/L scapular mobs, distraction   Rationale: decrease pain, increase tissue extensibility and decrease trigger points to increase L shoulder AROM without discomfort          With   [] TE   [] TA   [] neuro   [] other: Patient Education: [x] Review HEP    [] Progressed/Changed HEP based on:   [] positioning   [] body mechanics   [] transfers   [] heat/ice application    [] other:      Other Objective/Functional Measures: Continued with Rx program.  Pt winced during supine shoulder ABD, noting pain at end range. Addressed with manual therapy. Pain Level (0-10 scale) post treatment: 0/10    ASSESSMENT/Changes in Function: Pt able to exhibit full L shoulder AROM in Flex/ABD after manual therapy today. Pt noted no pain with movement. Pt notes still having pain and difficulty with shoulder IR. Will add in stretch for next visit. Pt sees MD on Monday to determine if cast for L wrist can be removed.   Pt will call if changes to Rx protocol are given. Patient will continue to benefit from skilled PT services to address functional mobility deficits, address ROM deficits, address strength deficits, analyze and address soft tissue restrictions, analyze and cue movement patterns, analyze and modify body mechanics/ergonomics, assess and modify postural abnormalities and instruct in home and community integration to attain remaining goals. []  See Plan of Care  []  See progress note/recertification  []  See Discharge Summary         Progress towards goals / Updated goals:  Short Term Goals: To be accomplished in 1 weeks:  Goal: Patient will be independent and compliant with HEP in order to progress toward long term goals. Status at last note/certification: Issued and reviewed  Current status: met  Long Term Goals: To be accomplished in 4 weeks:  Goal: Patient will improve FOTO assessment score to 65 in order to indicate improved functional abilities. Status at last note/certification: 39  Current status: reassess visit 5  Goal: Patient will improve Left elbow extension AROM to full in order to improve ease of job tasks. Status at last note/certification: Lacking 20 degrees  Current status:   Goal: Patient will improve Left shoulder flexion/abduction AROM in sitting to at least 130 degrees in order to improve ease of reaching and lifting tasks. Status at last note/certification: flexion 95, abduction 65  Current status: progressing - able to achieve full Flex, ABD AROM without pain after today's session  Goal: Patient will improve Left shoulder internal and external rotation AROM in supine to at least 70 degrees in order to improve ease of ADL.   Status at last note/certification: internal rotation 55, external rotation 40  Current status: progressing - still painful to perform     PLAN  []  Upgrade activities as tolerated     [x]  Continue plan of care  []  Update interventions per flow sheet       []  Discharge due to:_  []  Other:_      Ranjana Pacheco Zheng, PT 3/9/2017  5:10 PM    Future Appointments  Date Time Provider Grant Natarajan   3/13/2017 4:20 PM MD Scot Collado    3/14/2017 5:00 PM Diane Calzada, PT [facility]   3/16/2017 8:00 AM [facility]   3/17/2017 8:00 AM [facility]   3/20/2017 8:00 AM [facility]   3/21/2017 8:00 AM [facility]   3/22/2017 8:00 AM [facility]   3/23/2017 8:00 AM [facility]   3/24/2017 8:00 AM [facility]   3/27/2017 8:00 AM [facility]   3/28/2017 8:00 AM [facility]   3/29/2017 8:00 AM [facility]   3/30/2017 8:00 AM [facility]   3/31/2017 8:00 AM [facility]   4/3/2017 8:00 AM [facility]   4/4/2017 8:00 AM [facility]   4/5/2017 8:00 AM [facility]   4/6/2017 8:00 AM [facility]   4/25/2017 7:55 AM I[facility] NURSE VISIT [address]   5/2/2017 1:00 PM Renea Greene MD [address]

## 2017-03-13 ENCOUNTER — OFFICE VISIT (OUTPATIENT)
Dept: ORTHOPEDIC SURGERY | Age: 67
End: 2017-03-13

## 2017-03-13 VITALS — TEMPERATURE: 98 F | SYSTOLIC BLOOD PRESSURE: 130 MMHG | DIASTOLIC BLOOD PRESSURE: 65 MMHG | HEART RATE: 70 BPM

## 2017-03-13 DIAGNOSIS — S62.102D LEFT WRIST FRACTURE, WITH ROUTINE HEALING, SUBSEQUENT ENCOUNTER: Primary | ICD-10-CM

## 2017-03-13 DIAGNOSIS — M25.532 LEFT WRIST PAIN: ICD-10-CM

## 2017-03-13 NOTE — PROGRESS NOTES
Bell Jimenez  1950   Chief Complaint   Patient presents with    Wrist Pain     Left        HISTORY OF PRESENT ILLNESS  Bell Jimenez is a 61 y.o. female who presents today for reevaluation of left arm/wrist pain. She rates her pain 3/10 today. She states she went to step off of a curb while wearing heels when she fell 2 weeks ago. She states a soreness in her wrist that radiates toward the elbow. She also complains of stiffness. She states she has trouble sleeping at night due to the pain. She has attended PT for her shoulder and elbow with good benefit. Patient denies any fever, chills, chest pain, shortness of breath or calf pain. There are no new illness or injuries to report since last seen in the office. There are no changes to medications, allergies, family or social history. PHYSICAL EXAM:   Visit Vitals    /65    Pulse 70    Temp 98 °F (36.7 °C) (Oral)     The patient is a well-developed, well-nourished female   in no acute distress. The patient is alert and oriented times three. The patient is alert and oriented times three. Mood and affect are normal.  LYMPHATIC: lymph nodes are not enlarged and are within normal limits  SKIN: normal in color and non tender to palpation. There are no bruises or abrasions noted. NEUROLOGICAL: Motor sensory exam is within normal limits. Reflexes are equal bilaterally.  There is normal sensation to pinprick and light touch  MUSCULOSKELETAL:  Examination Left Hand   Skin Intact   Deformity -   Swelling -   Tenderness -   Tenderness A1 Pulley -   Finger flexion Full   Finger extension Full   Thenar Eminence Atrophy -   Sensation Normal   Capillary refill -   Heberden's nodes -   Dupuytren's -     Examination Left Wrist   Skin Intact   Tenderness -   Flexion 60   Extension 60   Deformity -   Effusion -   Tinnel's sign -   Phalen's test -   Finklestein maneuver -   Pain with thumb abduction -         IMAGING: Previous XR of the left wrist dated 2/27/17 was reviewed and read: Possible subtle loosened line in the distal radius. IMPRESSION:      ICD-10-CM ICD-9-CM    1. Left wrist fracture, with routine healing, subsequent encounter S62.102D V54.19 REFERRAL TO OCCUPATIONAL THERAPY      AMB SUPPLY ORDER      MRI WRIST LT WO CONT   2. Left wrist pain M25.532 719.43 AMB POC XRAY, WRIST; 2 VIEWS        PLAN:  Patient has seen good benefit in the mobility of her left shoulder through PT. I will refer her to OT for her left wrist fracture. She was also given a wrist splint today. We will also proceed with an MRI of the left wrist. I will see her back following completion of the MRI.   Follow-up Disposition: Not on File    Scribed by Kailee Cary [facility]) as dictated by MD Trish Urrutia M.D.   [facility] and [facility] Specialist

## 2017-03-14 ENCOUNTER — HOSPITAL ENCOUNTER (OUTPATIENT)
Dept: PHYSICAL THERAPY | Age: 67
Discharge: HOME | End: 2017-03-14
Payer: COMMERCIAL

## 2017-03-14 ENCOUNTER — HOSPITAL ENCOUNTER (OUTPATIENT)
Dept: RADIATION THERAPY | Age: 67
Discharge: HOME | End: 2017-03-14
Payer: COMMERCIAL

## 2017-03-14 PROCEDURE — 97110 THERAPEUTIC EXERCISES: CPT

## 2017-03-14 PROCEDURE — 97140 MANUAL THERAPY 1/> REGIONS: CPT

## 2017-03-14 NOTE — PROGRESS NOTES
PT DAILY TREATMENT NOTE     Patient Name: Mauricio Calderon  Date:3/14/2017  : 1950  [x]  Patient  Verified  Payor: Monica Daily / Plan: [employer] [place] [employer]MO / Product Type: HMO /    In time:5:00  Out time:5:45  Total Treatment Time (min): 45  Visit #: 4 of 8    Treatment Area: Adhesive capsulitis of left shoulder [M75.02]  Left wrist pain [M25.532]    SUBJECTIVE  Pain Level (0-10 scale): 1/10  Any medication changes, allergies to medications, adverse drug reactions, diagnosis change, or new procedure performed?: [x] No    [] Yes (see summary sheet for update)  Subjective functional status/changes:   [] No changes reported  \"I can tell a big difference in the shoulder. It's the rotational movements that are still tight. I am out of the cast for the wrist now but in this brace. He is going to send me for an MRI to the wrist because he's concerned about something but I'm not sure what that is. He said the wrist was healed. \"    OBJECTIVE    30 min Therapeutic Exercise:  [] See flow sheet :   Rationale: increase ROM and increase strength to improve the patients ability to dress, groom with ease    15 min Manual Therapy:  Gentle distraction; Inf/Post GHJ glides; S/L scapular mobs, distraction   Rationale: decrease pain, increase tissue extensibility and decrease trigger points to increase L shoulder AROM without discomfort          With   [] TE   [] TA   [] neuro   [] other: Patient Education: [x] Review HEP    [] Progressed/Changed HEP based on:   [] positioning   [] body mechanics   [] transfers   [] heat/ice application    [] other:      Other Objective/Functional Measures: Added sleeper stretch for IR/ER mobility. Pain Level (0-10 scale) post treatment: 0/10    ASSESSMENT/Changes in Function:  Discussed with Pt that insurance would not pay for another eval and that script just received would need to be adjusted so wrist visits could be included with shoulder Rx.   Pt informed that therapist would contact MD to get this changed and a reassessment would be performed once clearance received. Pt in agreement. Patient will continue to benefit from skilled PT services to address functional mobility deficits, address ROM deficits, address strength deficits, analyze and address soft tissue restrictions, analyze and cue movement patterns, analyze and modify body mechanics/ergonomics, assess and modify postural abnormalities and instruct in home and community integration to attain remaining goals. []  See Plan of Care  []  See progress note/recertification  []  See Discharge Summary         Progress towards goals / Updated goals:  Short Term Goals: To be accomplished in 1 weeks:  Goal: Patient will be independent and compliant with HEP in order to progress toward long term goals. Status at last note/certification: Issued and reviewed  Current status: met  Long Term Goals: To be accomplished in 4 weeks:  Goal: Patient will improve FOTO assessment score to 65 in order to indicate improved functional abilities. Status at last note/certification: 39  Current status: reassess visit 5  Goal: Patient will improve Left elbow extension AROM to full in order to improve ease of job tasks. Status at last note/certification: Lacking 20 degrees  Current status: met - full elbow ext  Goal: Patient will improve Left shoulder flexion/abduction AROM in sitting to at least 130 degrees in order to improve ease of reaching and lifting tasks. Status at last note/certification: flexion 95, abduction 65  Current status: progressing - able to achieve full Flex, ABD AROM without pain after today's session  Goal: Patient will improve Left shoulder internal and external rotation AROM in supine to at least 70 degrees in order to improve ease of ADL.   Status at last note/certification: internal rotation 55, external rotation 40  Current status: progressing - still painful to perform     PLAN  []  Upgrade activities as tolerated     [x] Continue plan of care  []  Update interventions per flow sheet       []  Discharge due to:_  []  Other:_      Diane Ozunaughtry, PT 3/14/2017  5:10 PM    Future Appointments  Date Time Provider Grant Natarajan   3/16/2017 8:00 AM [facility]   3/17/2017 8:00 AM [facility]   3/20/2017 8:00 AM [facility]   3/21/2017 8:00 AM [facility]   3/22/2017 8:00 AM [facility]   3/23/2017 8:00 AM [facility]   3/24/2017 8:00 AM [facility]   3/27/2017 8:00 AM [facility]   3/28/2017 8:00 AM [facility]   3/29/2017 8:00 AM [facility]   3/30/2017 8:00 AM [facility]   3/31/2017 8:00 AM [facility]   4/3/2017 8:00 AM [facility]   4/4/2017 8:00 AM [facility]   4/5/2017 8:00 AM [facility]   4/6/2017 8:00 AM [facility]   4/25/2017 7:55 AM IOC NURSE VISIT [facility] SCHED   5/2/2017 1:00 PM Maria Elena Wang MD [facility]

## 2017-03-15 ENCOUNTER — TELEPHONE (OUTPATIENT)
Dept: ORTHOPEDIC SURGERY | Age: 67
End: 2017-03-15

## 2017-03-15 DIAGNOSIS — M75.02 ADHESIVE CAPSULITIS OF LEFT SHOULDER: Primary | ICD-10-CM

## 2017-03-15 DIAGNOSIS — S62.102D LEFT WRIST FRACTURE, WITH ROUTINE HEALING, SUBSEQUENT ENCOUNTER: ICD-10-CM

## 2017-03-15 NOTE — TELEPHONE ENCOUNTER
Elias Sainz, [phone] [facility]- They are calling to see if they can get orders for left wrist fx [facility]

## 2017-03-15 NOTE — TELEPHONE ENCOUNTER
Called and spoke to Ben Roche. She states that they do not do OT there so they want an order for shoulder PT and include treatment for the wrist in the comment section so they can continue to treat both her wrist and shoulder. The patient wants to continue to go there. New order was put in and faxed to [phone].

## 2017-03-15 NOTE — TELEPHONE ENCOUNTER
Order for PT: active and passive rom, gentle strengthening 2-3x week x 4 weeks    Order for OT: active and passive rom, gentle strengthening 2-3xweeks x 4 weeks

## 2017-03-16 ENCOUNTER — HOSPITAL ENCOUNTER (OUTPATIENT)
Dept: RADIATION THERAPY | Age: 67
Discharge: HOME | End: 2017-03-16
Payer: COMMERCIAL

## 2017-03-16 PROCEDURE — 77280 THER RAD SIMULAJ FIELD SMPL: CPT

## 2017-03-16 PROCEDURE — 77412 RADIATION TX DELIVERY LVL 3: CPT

## 2017-03-17 ENCOUNTER — HOSPITAL ENCOUNTER (OUTPATIENT)
Dept: PHYSICAL THERAPY | Age: 67
Discharge: HOME | End: 2017-03-17
Payer: COMMERCIAL

## 2017-03-17 ENCOUNTER — HOSPITAL ENCOUNTER (OUTPATIENT)
Dept: RADIATION THERAPY | Age: 67
Discharge: HOME | End: 2017-03-17
Payer: COMMERCIAL

## 2017-03-17 PROCEDURE — 97110 THERAPEUTIC EXERCISES: CPT

## 2017-03-17 PROCEDURE — 77412 RADIATION TX DELIVERY LVL 3: CPT

## 2017-03-17 PROCEDURE — 97164 PT RE-EVAL EST PLAN CARE: CPT

## 2017-03-17 NOTE — PROGRESS NOTES
[facility] [address] [place] [address]  [phone] fax  Re-Evaluation/ Statement of Necessity for Continued Physical Therapy Services     Patient name: Mely Holguin Start of Care: 3/3/2017   Referral source: Kelechi Cisse,* : 1950    Medical Diagnosis: Adhesive capsulitis of left shoulder [M75.02] Onset Date:17    Treatment Diagnosis: Left shoulder pain, Left wrist pain   Prior Hospitalization: see medical history Provider#: [id number]   Medications: Verified on Patient summary List   Comorbidities: diabetes, allergies, prior surgery (lumpectomy)  Prior Level of Function: Functionally independent, lives with ,  at [address] [place] [address] and following information is based on the information from the initial evaluation. Ressessment/ key information: Patient has been attending therapy for Left shoulder pain following a fall/Left wrist fracture on 17. She has progressed well with therapy at this time, demonstrating near full shoulder AROM (some pain/limitations with abduction today) and grossly 3+/5 shoulder strength. On 3/13/17 Patient had her left wrist plaster cast removed and demonstrates deficits in range of motion and strength. Limited in ability to lift, , fasten, and drive due to Left UE impairments. Left wrist AROM as follows: extension 70 degrees, flexion 30, ulnar deviation 10, radial deviation 20; Right wrist full. Significant pain with active supination, to 30 degrees from neutral whereas pronation is full. Full Left elbow AROM but strength grossly 4+/5 due to pain. Tender in the Left forearm. Patient remains an appropriate candidate for skilled physical therapy in order to improve Left UE function.      Problem List: pain affecting function, decrease ROM, decrease strength, edema affecting function, decrease ADL/ functional abilitiies, decrease activity tolerance, decrease flexibility/ joint mobility and decrease transfer abilities   Treatment Plan may include any combination of the following: Therapeutic exercise, Therapeutic activities, Neuromuscular re-education, Physical agent/modality, Manual therapy, Patient education and Self Care training  Patient / Family readiness to learn indicated by: asking questions, trying to perform skills and interest  Persons(s) to be included in education: patient (P)  Barriers to Learning/Limitations: None  Patient Goal (s): mobility    Progress Toward Goals:  Short Term Goals: To be accomplished in 1 weeks:  Goal: Patient will be independent and compliant with HEP in order to progress toward long term goals. Status at last note/certification: Issued and reviewed  Current status: met  Long Term Goals: To be accomplished in 4 weeks:  Goal: Patient will improve FOTO assessment score to 65 in order to indicate improved functional abilities. Status at last note/certification: 39  Current status: Assess next visit  Goal: Patient will improve Left elbow extension AROM to full in order to improve ease of job tasks. Status at last note/certification: Lacking 20 degrees  Current status: Met  Goal: Patient will improve Left shoulder flexion/abduction AROM in sitting to at least 130 degrees in order to improve ease of reaching and lifting tasks. Status at last note/certification: flexion 95, abduction 65  Current status: Progressing, flexion full, abduction 95 degrees (Some pain)  Goal: Patient will improve Left shoulder internal and external rotation AROM in supine to at least 70 degrees in order to improve ease of ADL. Status at last note/certification: internal rotation 55, external rotation 40  Current status: Met      Goals: To be accomplished in 5 weeks:  Goal: Patient will improve Left shoulder strength to grossly 4+/5 in order to improve ease of overhead activities.   Status at last note/certification: 3+/5  Goal: Patient will improve Left wrist AROM by at least 10 degrees all planes in order to improve ease of ADL. Status at last note/certification: extension 70 degrees, flexion 30, ulnar deviation 10, radial deviation 20  Goal: Patient will improve Left forearm supination to at least 70 degrees in order to improve ease of household tasks. Status at last note/certification: 30 degrees  Goal: Patient will report worst Left wrist pain as 3/10 or less in order to improve ease of job tasks. Status at last note/certification: 7/65  Goal: Patient will improve FOTO assessment score to 65 in order to indicate improved functional abilities. Status at last note/certification: 39    Frequency / Duration: Patient to be seen 2 times per week for 5 weeks. [x]  Plan of care has been reviewed with LUCAS Morillo, PT 3/17/2017 1:39 PM  _____________________________________________________________________  I certify that the above Therapy Services are being furnished while the patient is under my care. I agree with the treatment plan and certify that this therapy is necessary.     Physician's Signature:____________________  Date:__________Time:______    Please sign and return to [facility] MARIBEL DAMON [address]  [phone] fax

## 2017-03-17 NOTE — PROGRESS NOTES
PT DAILY TREATMENT NOTE     Patient Name: Cecilia Ramey  Date:3/17/2017  : 1950  [x]  Patient  Verified  Payor: Braulio Maxwell / Plan: [employer] / Product Type: HMO /    In time:403  Out time:440  Total Treatment Time (min): 37  Visit #: 5 of 8    Treatment Area: Adhesive capsulitis of left shoulder [M75.02]  Left wrist pain [M25.532]    SUBJECTIVE  Pain Level (0-10 scale): 0  Any medication changes, allergies to medications, adverse drug reactions, diagnosis change, or new procedure performed?: [x] No    [] Yes (see summary sheet for update)  Subjective functional status/changes:   [] No changes reported  I started radiation yesterday.     OBJECTIVE    Modality rationale: Patient declined   Min Type Additional Details    [] Estim:  []Unatt       []IFC  []Premod                        []Other:  []w/ice   []w/heat  Position:  Location:    [] Estim: []Att    []TENS instruct  []NMES                    []Other:  []w/US   []w/ice   []w/heat  Position:  Location:    []  Traction: [] Cervical       []Lumbar                       [] Prone          []Supine                       []Intermittent   []Continuous Lbs:  [] before manual  [] after manual    []  Ultrasound: []Continuous   [] Pulsed                           []1MHz   []3MHz W/cm2:  Location:    []  Iontophoresis with dexamethasone         Location: [] Take home patch   [] In clinic    []  Ice     []  heat  []  Ice massage  []  Laser   []  Anodyne Position:  Location:    []  Laser with stim  []  Other:  Position:  Location:    []  Vasopneumatic Device Pressure:       [] lo [] med [] hi   Temperature: [] lo [] med [] hi   [] Skin assessment post-treatment:  []intact []redness- no adverse reaction    []redness  adverse reaction:     10 min []Eval                  [x]Re-Eval       22 min Therapeutic Exercise:  [x] See flow sheet :   Rationale: increase ROM and increase strength to improve the patients ability to perform ADL    5 min Manual Therapy: Manual stretching Left shoulder   Rationale: decrease pain, increase ROM and increase tissue extensibility to perform job tasks        With   [] TE   [] TA   [] neuro   [] other: Patient Education: [x] Review HEP    [] Progressed/Changed HEP based on:   [] positioning   [] body mechanics   [] transfers   [] heat/ice application    [] other:      Other Objective/Functional Measures: see reevaluation      Pain Level (0-10 scale) post treatment: 0    ASSESSMENT/Changes in Function: Adding Left wrist to POC due to cast removed 3/13/17 and currently decreased ROM and strength. Patient will continue to benefit from skilled PT services to modify and progress therapeutic interventions, address functional mobility deficits, address ROM deficits, address strength deficits, analyze and address soft tissue restrictions, analyze and cue movement patterns, analyze and modify body mechanics/ergonomics, assess and modify postural abnormalities and instruct in home and community integration to attain remaining goals. []  See Plan of Care  [x]  See progress note/recertification  []  See Discharge Summary         Progress towards goals / Updated goals:  Short Term Goals: To be accomplished in 1 weeks:  Goal: Patient will be independent and compliant with HEP in order to progress toward long term goals. Status at last note/certification: Issued and reviewed  Current status: met  Long Term Goals: To be accomplished in 4 weeks:  Goal: Patient will improve FOTO assessment score to 65 in order to indicate improved functional abilities. Status at last note/certification: 39  Current status: Assess next visit  Goal: Patient will improve Left elbow extension AROM to full in order to improve ease of job tasks.   Status at last note/certification: Lacking 20 degrees  Current status: Met  Goal: Patient will improve Left shoulder flexion/abduction AROM in sitting to at least 130 degrees in order to improve ease of reaching and lifting tasks.  Status at last note/certification: flexion 95, abduction 65  Current status: Progressing, flexion full, abduction 95 degrees (Some pain)  Goal: Patient will improve Left shoulder internal and external rotation AROM in supine to at least 70 degrees in order to improve ease of ADL.   Status at last note/certification: internal rotation 55, external rotation 40  Current status: Met    PLAN  [x]  Upgrade activities as tolerated     [x]  Continue plan of care  []  Update interventions per flow sheet       []  Discharge due to:_  []  Other:_      Ekta Collier, PT 3/17/2017  1:38 PM    Future Appointments  Date Time Provider Grant Natarajan   3/17/2017 4:00 PM Ekta Collier, PT [facility]   3/20/2017 8:00 AM [facility]   3/21/2017 8:00 AM [facility]COLOG[facility]   3/22/2017 8:00 AM [facility]COL[facility]   3/23/2017 8:00 AM [facility]COL[facility]   3/24/2017 8:00 AM [facility]COL[facility]   3/25/2017 8:00 AM HBV MRI RM 2 [facility]   3/27/2017 8:00 AM [facility]COLOG[facility]   3/28/2017 8:00 AM HBV RADIATION ONCOLOGY [facility]   3/29/2017 8:00 AM [facility]ATION [facility]COLOG[facility]   3/30/2017 8:00 AM HBV [facility]IATION ONCOLOG[facility]   3/31/2017 8:00 AM [facility]COLOGY [facility]   4/3/2017 8:00 AM [facility]COLOG[facility]   4/4/2017 8:00 AM [facility]ATION [facility]COLOG[facility]   4/5/2017 8:00 AM HBV RADIATION ONCOLOGY [facility]   4/6/2017 8:00 AM HBV RADIATION ONCOLOGY [facility]   4/25/2017 7:55 AM IOC NURSE VISIT [address]   5/2/2017 1:00 PM Brenda Lenz MD [address]

## 2017-03-20 ENCOUNTER — HOSPITAL ENCOUNTER (OUTPATIENT)
Dept: RADIATION THERAPY | Age: 67
Discharge: HOME | End: 2017-03-20
Payer: COMMERCIAL

## 2017-03-20 PROCEDURE — 77412 RADIATION TX DELIVERY LVL 3: CPT

## 2017-03-21 ENCOUNTER — APPOINTMENT (OUTPATIENT)
Dept: PHYSICAL THERAPY | Age: 67
End: 2017-03-21
Payer: COMMERCIAL

## 2017-03-21 ENCOUNTER — HOSPITAL ENCOUNTER (OUTPATIENT)
Dept: RADIATION THERAPY | Age: 67
Discharge: HOME | End: 2017-03-21
Payer: COMMERCIAL

## 2017-03-21 PROCEDURE — 77412 RADIATION TX DELIVERY LVL 3: CPT

## 2017-03-22 ENCOUNTER — HOSPITAL ENCOUNTER (OUTPATIENT)
Dept: RADIATION THERAPY | Age: 67
Discharge: HOME | End: 2017-03-22
Payer: COMMERCIAL

## 2017-03-22 PROCEDURE — 77412 RADIATION TX DELIVERY LVL 3: CPT

## 2017-03-23 ENCOUNTER — HOSPITAL ENCOUNTER (OUTPATIENT)
Dept: RADIATION THERAPY | Age: 67
Discharge: HOME | End: 2017-03-23
Payer: COMMERCIAL

## 2017-03-23 ENCOUNTER — HOSPITAL ENCOUNTER (OUTPATIENT)
Dept: PHYSICAL THERAPY | Age: 67
Discharge: HOME | End: 2017-03-23
Payer: COMMERCIAL

## 2017-03-23 PROCEDURE — 77417 THER RADIOLOGY PORT IMAGE(S): CPT

## 2017-03-23 PROCEDURE — 97110 THERAPEUTIC EXERCISES: CPT

## 2017-03-23 PROCEDURE — 77412 RADIATION TX DELIVERY LVL 3: CPT

## 2017-03-23 PROCEDURE — 97140 MANUAL THERAPY 1/> REGIONS: CPT

## 2017-03-23 NOTE — PROGRESS NOTES
PT DAILY TREATMENT NOTE     Patient Name: Maxwell Booth  Date:3/23/2017  : 1950  [x]  Patient  Verified  Payor: Jackelyn Del Rosario / Plan: [address] [place] [address] [employer] [place] [employer] / Product Type: HMO /    In time:4;05  Out time: 4;45  Total Treatment Time (min): 40  Visit #: 1 of 10    Treatment Area: Adhesive capsulitis of left shoulder [M75.02]  Left wrist pain [M25.532]    SUBJECTIVE  Pain Level (0-10 scale):  1  Any medication changes, allergies to medications, adverse drug reactions, diagnosis change, or new procedure performed?: [x] No    [] Yes (see summary sheet for update)  Subjective functional status/changes:   [] No changes reported  If I move my arm wrong I feelit    OBJECTIVE    32 min Therapeutic Exercise:  [] See flow sheet :   Rationale: increase ROM and increase strength to improve the patients ability to perform daily tasks, job duties    8 min Manual Therapy:  STM to L UT, scap mobs   Rationale: increase ROM and increase tissue extensibility to improve ease of UE function       With   [] TE   [] TA   [] neuro   [] other: Patient Education: [x] Review HEP    [] Progressed/Changed HEP based on:   [] positioning   [] body mechanics   [] transfers   [] heat/ice application    [] other:      Other Objective/Functional Measures: Added therabar (red) and eggcercizer     Pain Level (0-10 scale) post treatment:  0    ASSESSMENT/Changes in Function: WNL L houlder ROM. Pain along middle deltoid with flexion and abd of GHJ.   Multiple TP's throughout the L UT and into cs    Patient will continue to benefit from skilled PT services to modify and progress therapeutic interventions, address functional mobility deficits, address ROM deficits, address strength deficits, analyze and address soft tissue restrictions, analyze and cue movement patterns, analyze and modify body mechanics/ergonomics, assess and modify postural abnormalities, address imbalance/dizziness and instruct in home and community integration to attain remaining goals. []  See Plan of Care  []  See progress note/recertification  []  See Discharge Summary         Progress towards goals / Updated goals:  Goal: Patient will improve Left shoulder strength to grossly 4+/5 in order to improve ease of overhead activities. Status at last note/certification: 3+/5  Goal: Patient will improve Left wrist AROM by at least 10 degrees all planes in order to improve ease of ADL. Status at last note/certification: extension 70 degrees, flexion 30, ulnar deviation 10, radial deviation 20  Goal: Patient will improve Left forearm supination to at least 70 degrees in order to improve ease of household tasks. Status at last note/certification: 30 degrees  Goal: Patient will report worst Left wrist pain as 3/10 or less in order to improve ease of job tasks. Status at last note/certification: 9/69  Goal: Patient will improve FOTO assessment score to 65 in order to indicate improved functional abilities.   Status at last note/certification: 44       PLAN  [x]  Upgrade activities as tolerated     []  Continue plan of care  []  Update interventions per flow sheet       []  Discharge due to:_  []  Other:_      Jackelyn Moctezuma PTA 3/23/2017  4:21 PM    Future Appointments  Date Time Provider Grant Natarajan   3/24/2017 8:00 AM [facility]   3/25/2017 8:00 AM HBV MRI RM 2 [facility]   3/27/2017 8:00 AM [facility]COLOG[facility]   3/28/2017 8:00 AM [facility]COLOG[facility]   3/29/2017 8:00 AM [facility]COL[facility]   3/30/2017 8:00 AM [facility]COLOG[facility]   3/31/2017 8:00 AM [facility]COL[facility]   4/3/2017 8:00 AM [facility]COL[facility]   4/4/2017 8:00 AM [facility]ATION ONCOLOG[facility]   4/5/2017 8:00 AM [facility]COLOG[facility]   4/6/2017 8:00 AM [facility]COL[facility]   4/25/2017 7:55 AM IOC NURSE VISIT [address]   5/2/2017 1:00 PM Patria Saint, MD [facility]

## 2017-03-24 ENCOUNTER — HOSPITAL ENCOUNTER (OUTPATIENT)
Dept: RADIATION THERAPY | Age: 67
Discharge: HOME | End: 2017-03-24
Payer: COMMERCIAL

## 2017-03-24 PROCEDURE — 77412 RADIATION TX DELIVERY LVL 3: CPT

## 2017-03-25 ENCOUNTER — HOSPITAL ENCOUNTER (OUTPATIENT)
Age: 67
Discharge: HOME | End: 2017-03-25
Attending: PHYSICIAN ASSISTANT
Payer: COMMERCIAL

## 2017-03-25 DIAGNOSIS — S62.102D LEFT WRIST FRACTURE, WITH ROUTINE HEALING, SUBSEQUENT ENCOUNTER: ICD-10-CM

## 2017-03-25 PROCEDURE — 73221 MRI JOINT UPR EXTREM W/O DYE: CPT

## 2017-03-27 ENCOUNTER — HOSPITAL ENCOUNTER (OUTPATIENT)
Dept: RADIATION THERAPY | Age: 67
Discharge: HOME | End: 2017-03-27
Payer: COMMERCIAL

## 2017-03-27 ENCOUNTER — HOSPITAL ENCOUNTER (OUTPATIENT)
Dept: PHYSICAL THERAPY | Age: 67
Discharge: HOME | End: 2017-03-27
Payer: COMMERCIAL

## 2017-03-27 PROCEDURE — 97110 THERAPEUTIC EXERCISES: CPT

## 2017-03-27 PROCEDURE — 77412 RADIATION TX DELIVERY LVL 3: CPT

## 2017-03-27 PROCEDURE — 97140 MANUAL THERAPY 1/> REGIONS: CPT

## 2017-03-27 NOTE — PROGRESS NOTES
PT DAILY TREATMENT NOTE     Patient Name: Mitchell Garcia  Date:3/27/2017  : 1950  [x]  Patient  Verified  Payor: Erendira Almanzar / Plan: [employer] [place] [employer] / Product Type: HMO /    In time:4:00  Out time: 4:50  Total Treatment Time (min): 50  Visit #: 2 of 10    Treatment Area: Adhesive capsulitis of left shoulder [M75.02]  Left wrist pain [M25.532]    SUBJECTIVE  Pain Level (0-10 scale): 0/10 shoulder; 4/10 wrist  Any medication changes, allergies to medications, adverse drug reactions, diagnosis change, or new procedure performed?: [x] No    [] Yes (see summary sheet for update)  Subjective functional status/changes:   [] No changes reported  \"The shoulder is doing wonderful. I just have a little stiffness in the back of the shoulder. \"    OBJECTIVE    40 min Therapeutic Exercise:  [] See flow sheet :   Rationale: increase ROM and increase strength to improve the patients ability to perform daily tasks, job duties    10 min Manual Therapy:  Inf/Post GHJ glides L Sh; manual stretching L wrist; STM L deltoid, extensor wad   Rationale: increase ROM and increase tissue extensibility to improve ease of UE function       With   [] TE   [] TA   [] neuro   [] other: Patient Education: [x] Review HEP    [] Progressed/Changed HEP based on:   [] positioning   [] body mechanics   [] transfers   [] heat/ice application    [] other:      Other Objective/Functional Measures: Initiated manual therapy for L wrist.    Pain Level (0-10 scale) post treatment:  0/10 shoulder; 3/10 wrist    ASSESSMENT/Changes in Function: Pt reports increased pain with pronation, supination of L forearm. Pt exhibited mild stiffness into L shoulder ER that improved with manual therapy.     Patient will continue to benefit from skilled PT services to modify and progress therapeutic interventions, address functional mobility deficits, address ROM deficits, address strength deficits, analyze and address soft tissue restrictions, analyze and cue movement patterns, analyze and modify body mechanics/ergonomics, assess and modify postural abnormalities, address imbalance/dizziness and instruct in home and community integration to attain remaining goals. []  See Plan of Care  []  See progress note/recertification  []  See Discharge Summary         Progress towards goals / Updated goals:  Goal: Patient will improve Left shoulder strength to grossly 4+/5 in order to improve ease of overhead activities. Status at last note/certification: 3+/5  Goal: Patient will improve Left wrist AROM by at least 10 degrees all planes in order to improve ease of ADL. Status at last note/certification: extension 70 degrees, flexion 30, ulnar deviation 10, radial deviation 20  Goal: Patient will improve Left forearm supination to at least 70 degrees in order to improve ease of household tasks. Status at last note/certification: 30 degrees  Goal: Patient will report worst Left wrist pain as 3/10 or less in order to improve ease of job tasks. Status at last note/certification: 0/17  Goal: Patient will improve FOTO assessment score to 65 in order to indicate improved functional abilities.   Status at last note/certification: 44       PLAN  [x]  Upgrade activities as tolerated     []  Continue plan of care  []  Update interventions per flow sheet       []  Discharge due to:_  [x]  Other:_goals next visit      Jose Holman PT 3/27/2017  4:21 PM    Future Appointments  Date Time Provider Grant Natarajan   3/28/2017 8:00 AM [facility]   3/29/2017 8:00 AM [facility]   3/29/2017 4:00 PM [facility]   3/30/2017 8:00 AM [facility]COL[facility]   3/31/2017 8:00 AM [facility]   4/3/2017 8:00 AM [facility]   4/4/2017 8:00 AM [facility]   4/4/2017 4:20 PM MD TOMMY Urrutia   4/5/2017 8:00 AM [facility]COL[facility]   4/6/2017 8:00 AM [facility]COL[facility]   4/25/2017 7:55 AM IOC NURSE VISIT [facility]   5/2/2017 1:00 PM Rodríguez Rollins MD [facility]

## 2017-03-28 ENCOUNTER — HOSPITAL ENCOUNTER (OUTPATIENT)
Dept: RADIATION THERAPY | Age: 67
Discharge: HOME | End: 2017-03-28
Payer: COMMERCIAL

## 2017-03-28 PROCEDURE — 77412 RADIATION TX DELIVERY LVL 3: CPT

## 2017-03-29 ENCOUNTER — HOSPITAL ENCOUNTER (OUTPATIENT)
Dept: RADIATION THERAPY | Age: 67
Discharge: HOME | End: 2017-03-29
Payer: COMMERCIAL

## 2017-03-29 ENCOUNTER — APPOINTMENT (OUTPATIENT)
Dept: PHYSICAL THERAPY | Age: 67
End: 2017-03-29
Payer: COMMERCIAL

## 2017-03-29 PROCEDURE — 77412 RADIATION TX DELIVERY LVL 3: CPT

## 2017-03-29 PROCEDURE — 77336 RADIATION PHYSICS CONSULT: CPT

## 2017-03-30 ENCOUNTER — HOSPITAL ENCOUNTER (OUTPATIENT)
Dept: RADIATION THERAPY | Age: 67
Discharge: HOME | End: 2017-03-30
Payer: COMMERCIAL

## 2017-03-30 PROCEDURE — 77417 THER RADIOLOGY PORT IMAGE(S): CPT

## 2017-03-30 PROCEDURE — 77412 RADIATION TX DELIVERY LVL 3: CPT

## 2017-03-31 ENCOUNTER — HOSPITAL ENCOUNTER (OUTPATIENT)
Dept: RADIATION THERAPY | Age: 67
Discharge: HOME | End: 2017-03-31
Payer: COMMERCIAL

## 2017-03-31 PROCEDURE — 77290 THER RAD SIMULAJ FIELD CPLX: CPT

## 2017-03-31 PROCEDURE — 77412 RADIATION TX DELIVERY LVL 3: CPT

## 2017-04-03 ENCOUNTER — HOSPITAL ENCOUNTER (OUTPATIENT)
Dept: RADIATION THERAPY | Age: 67
Discharge: HOME | End: 2017-04-03
Payer: COMMERCIAL

## 2017-04-03 PROCEDURE — 77412 RADIATION TX DELIVERY LVL 3: CPT

## 2017-04-04 ENCOUNTER — HOSPITAL ENCOUNTER (OUTPATIENT)
Dept: RADIATION THERAPY | Age: 67
Discharge: HOME | End: 2017-04-04
Payer: COMMERCIAL

## 2017-04-04 ENCOUNTER — OFFICE VISIT (OUTPATIENT)
Dept: ORTHOPEDIC SURGERY | Age: 67
End: 2017-04-04

## 2017-04-04 VITALS
BODY MASS INDEX: 24.1 KG/M2 | DIASTOLIC BLOOD PRESSURE: 69 MMHG | WEIGHT: 136 LBS | SYSTOLIC BLOOD PRESSURE: 135 MMHG | TEMPERATURE: 97.8 F | HEART RATE: 96 BPM | HEIGHT: 63 IN

## 2017-04-04 DIAGNOSIS — S62.025G CLOSED NONDISPLACED FRACTURE OF MIDDLE THIRD OF SCAPHOID OF LEFT WRIST WITH DELAYED HEALING, SUBSEQUENT ENCOUNTER: ICD-10-CM

## 2017-04-04 DIAGNOSIS — S62.102D LEFT WRIST FRACTURE, WITH ROUTINE HEALING, SUBSEQUENT ENCOUNTER: Primary | ICD-10-CM

## 2017-04-04 PROCEDURE — 77412 RADIATION TX DELIVERY LVL 3: CPT

## 2017-04-04 NOTE — PROGRESS NOTES
Darell Elena  1950   Chief Complaint   Patient presents with    Wrist Pain     Left, MRI results        HISTORY OF PRESENT ILLNESS  Darell Elena is a 61 y.o. female who presents today for reevaluation of left arm/wrist pain and to review her MRI results. She rates her pain 2/10 today. She states she went to step off of a curb while wearing heels when she fell 2 weeks ago. She states a soreness in her wrist that radiates toward the elbow. She also complains of stiffness. She states she has trouble sleeping at night due to the pain. She has attended PT for her shoulder and elbow with good benefit. Patient denies any fever, chills, chest pain, shortness of breath or calf pain. There are no new illness or injuries to report since last seen in the office. There are no changes to medications, allergies, family or social history. PHYSICAL EXAM:   Visit Vitals    /69    Pulse 96    Temp 97.8 °F (36.6 °C) (Oral)    Ht 5' 3\" (1.6 m)    Wt 136 lb (61.7 kg)    BMI 24.09 kg/m2     The patient is a well-developed, well-nourished female   in no acute distress. The patient is alert and oriented times three. The patient is alert and oriented times three. Mood and affect are normal.  LYMPHATIC: lymph nodes are not enlarged and are within normal limits  SKIN: normal in color and non tender to palpation. There are no bruises or abrasions noted. NEUROLOGICAL: Motor sensory exam is within normal limits. Reflexes are equal bilaterally.  There is normal sensation to pinprick and light touch  MUSCULOSKELETAL:  Examination Left Hand   Skin Intact   Deformity -   Swelling -   Tenderness -   Tenderness A1 Pulley -   Finger flexion Full   Finger extension Full   Thenar Eminence Atrophy -   Sensation Normal   Capillary refill -   Heberden's nodes -   Dupuytren's -     Examination Left Wrist   Skin Intact   Tenderness +scaphoid, mild, distal ru joint   Flexion 30   Extension 30   Deformity - Effusion -   Tinnel's sign -   Phalen's test -   Finklestein maneuver -   Pain with thumb abduction -         IMAGING: MRI of the left wrist dated 3/25/17 was reviewed and read:   IMPRESSION:  1. Partially healing scaphoid waist fracture. 2. 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 3. Mild ECU tendinosis. 4. Mild extensor digitorum tenosynovitis distal to the radius. 5. Mild distal radioulnar joint effusion with likely small loose body. Mild  radiocarpal joint effusion. IMPRESSION:      ICD-10-CM ICD-9-CM    1. Left wrist fracture, with routine healing, subsequent encounter S62.102D V54.19 CAST SUP SHT ARM ADULT FBRGL      IA APPLY FOREARM CAST   2. Closed nondisplaced fracture of middle third of scaphoid of left wrist with delayed healing, subsequent encounter S62.025G V54.19         PLAN:  I discussed the results of the MRI and the treatment options with the patient. MRI shows a healing scaphoid fracture. She is still experiencing some pain, however, it has improved. A cast was applied to the patients wrist today. I will see her back in 3 weeks for reevaluation.    Follow-up Disposition: Not on File    Scribed by Lenora Silveira [facility]) as dictated by MD Goldie Adame M.D.   [facility]

## 2017-04-05 ENCOUNTER — HOSPITAL ENCOUNTER (OUTPATIENT)
Dept: RADIATION THERAPY | Age: 67
Discharge: HOME | End: 2017-04-05
Payer: COMMERCIAL

## 2017-04-05 PROCEDURE — 77280 THER RAD SIMULAJ FIELD SMPL: CPT

## 2017-04-05 PROCEDURE — 77336 RADIATION PHYSICS CONSULT: CPT

## 2017-04-05 PROCEDURE — 77412 RADIATION TX DELIVERY LVL 3: CPT

## 2017-04-05 PROCEDURE — 77300 RADIATION THERAPY DOSE PLAN: CPT

## 2017-04-06 ENCOUNTER — HOSPITAL ENCOUNTER (OUTPATIENT)
Dept: RADIATION THERAPY | Age: 67
Discharge: HOME | End: 2017-04-06
Payer: COMMERCIAL

## 2017-04-06 PROCEDURE — 77334 RADIATION TREATMENT AID(S): CPT

## 2017-04-06 PROCEDURE — 77321 SPECIAL TELETX PORT PLAN: CPT

## 2017-04-06 PROCEDURE — 77412 RADIATION TX DELIVERY LVL 3: CPT

## 2017-04-06 NOTE — PROGRESS NOTES
[facility], [address] [place] [address]  [phone] fax    Discharge Summary    Patient name: Ellsworth Harada Start of Care: 3/3/2017   Referral source: Jaqueline Sanders,* : 1950    Medical Diagnosis: Adhesive capsulitis of left shoulder [M75.02] Onset Date:17    Treatment Diagnosis: Left shoulder pain, Left wrist pain   Prior Hospitalization: see medical history Provider#: [id number]   Medications: Verified on Patient summary List   Comorbidities: diabetes, allergies, prior surgery (lumpectomy)  Prior Level of Function: Functionally independent, lives with ,  at [facility]    Visits from [place] of Care: 7    Missed Visits: 1    Reporting Period : 3/17/17 to 17    Goal: Patient will improve Left shoulder strength to grossly 4+/5 in order to improve ease of overhead activities. Status at last note/certification: 3+/5  Goal: Patient will improve Left wrist AROM by at least 10 degrees all planes in order to improve ease of ADL. Status at last note/certification: extension 70 degrees, flexion 30, ulnar deviation 10, radial deviation 20  Goal: Patient will improve Left forearm supination to at least 70 degrees in order to improve ease of household tasks. Status at last note/certification: 30 degrees  Goal: Patient will report worst Left wrist pain as 3/10 or less in order to improve ease of job tasks. Status at last note/certification:   Goal: Patient will improve FOTO assessment score to 65 in order to indicate improved functional abilities.   Status at last note/certification: 44    Assessment/ Summary of Care: Pt was progressing well with L shoulder treatment and had just begun therapy to L wrist when Pt notified therapy the MD wanted an MRI on the L wrist.  Pt stopped attending therapy after 3/27/17 appt and upon being contacted, requested D/C as another fracture and torn ligaments were found in L wrist and Pt was re-casted. Pt will D/C at this time and will transition to OT once cast is removed. Goals were unable to be reassessed.   Thank you for this referral.    RECOMMENDATIONS:  [x]Discontinue therapy: [x]Patient has reached or is progressing toward set goals      [x]Patient is non-compliant or has abdicated      []Due to lack of appreciable progress towards set goals    Diane Calzada, PT 4/6/2017 12:38 PM

## 2017-04-07 ENCOUNTER — HOSPITAL ENCOUNTER (OUTPATIENT)
Dept: RADIATION THERAPY | Age: 67
Discharge: HOME | End: 2017-04-07
Payer: COMMERCIAL

## 2017-04-07 PROCEDURE — 77412 RADIATION TX DELIVERY LVL 3: CPT

## 2017-04-10 ENCOUNTER — HOSPITAL ENCOUNTER (OUTPATIENT)
Dept: RADIATION THERAPY | Age: 67
Discharge: HOME | End: 2017-04-10
Payer: COMMERCIAL

## 2017-04-10 PROCEDURE — 77412 RADIATION TX DELIVERY LVL 3: CPT

## 2017-04-11 ENCOUNTER — HOSPITAL ENCOUNTER (OUTPATIENT)
Dept: RADIATION THERAPY | Age: 67
Discharge: HOME | End: 2017-04-11
Payer: COMMERCIAL

## 2017-04-11 PROCEDURE — 77412 RADIATION TX DELIVERY LVL 3: CPT

## 2017-04-12 ENCOUNTER — TELEPHONE (OUTPATIENT)
Dept: PULMONOLOGY | Age: 67
End: 2017-04-12

## 2017-04-12 ENCOUNTER — OFFICE VISIT (OUTPATIENT)
Dept: PULMONOLOGY | Age: 67
End: 2017-04-12

## 2017-04-12 ENCOUNTER — HOSPITAL ENCOUNTER (OUTPATIENT)
Dept: RADIATION THERAPY | Age: 67
Discharge: HOME | End: 2017-04-12
Payer: COMMERCIAL

## 2017-04-12 VITALS
OXYGEN SATURATION: 98 % | WEIGHT: 139 LBS | SYSTOLIC BLOOD PRESSURE: 150 MMHG | DIASTOLIC BLOOD PRESSURE: 90 MMHG | TEMPERATURE: 98.1 F | HEART RATE: 108 BPM | RESPIRATION RATE: 19 BRPM | BODY MASS INDEX: 24.63 KG/M2 | HEIGHT: 63 IN

## 2017-04-12 DIAGNOSIS — R05.8 PRODUCTIVE COUGH: Primary | ICD-10-CM

## 2017-04-12 DIAGNOSIS — R09.89 CHEST CONGESTION: ICD-10-CM

## 2017-04-12 DIAGNOSIS — J47.9 BRONCHIECTASIS WITHOUT COMPLICATION (HCC): ICD-10-CM

## 2017-04-12 PROCEDURE — 77336 RADIATION PHYSICS CONSULT: CPT

## 2017-04-12 PROCEDURE — 77412 RADIATION TX DELIVERY LVL 3: CPT

## 2017-04-12 RX ORDER — ALBUTEROL SULFATE 90 UG/1
2 AEROSOL, METERED RESPIRATORY (INHALATION)
Qty: 1 INHALER | Refills: 3 | Status: SHIPPED | OUTPATIENT
Start: 2017-04-12 | End: 2017-04-12 | Stop reason: SDUPTHER

## 2017-04-12 RX ORDER — ALBUTEROL SULFATE 90 UG/1
2 AEROSOL, METERED RESPIRATORY (INHALATION)
Qty: 1 INHALER | Refills: 3 | Status: SHIPPED | OUTPATIENT
Start: 2017-04-12 | End: 2021-02-25 | Stop reason: SDUPTHER

## 2017-04-12 RX ORDER — AMOXICILLIN AND CLAVULANATE POTASSIUM 500; 125 MG/1; MG/1
1 TABLET, FILM COATED ORAL EVERY 12 HOURS
Qty: 20 TAB | Refills: 0 | Status: SHIPPED | OUTPATIENT
Start: 2017-04-12 | End: 2017-05-02 | Stop reason: ALTCHOICE

## 2017-04-12 RX ORDER — FLUTICASONE PROPIONATE AND SALMETEROL 250; 50 UG/1; UG/1
1 POWDER RESPIRATORY (INHALATION) EVERY 12 HOURS
Qty: 1 INHALER | Refills: 3 | Status: SHIPPED | OUTPATIENT
Start: 2017-04-12 | End: 2017-09-07 | Stop reason: SDUPTHER

## 2017-04-12 RX ORDER — FLUTICASONE PROPIONATE AND SALMETEROL 250; 50 UG/1; UG/1
1 POWDER RESPIRATORY (INHALATION) 2 TIMES DAILY
Qty: 1 INHALER | Refills: 3 | Status: SHIPPED | OUTPATIENT
Start: 2017-04-12 | End: 2017-04-12 | Stop reason: SDUPTHER

## 2017-04-12 NOTE — PROGRESS NOTES
[facility]  Pulmonary, Critical Care, and Sleep Medicine      Pulmonary Office Progress Notes    Name: Denver Marquis     : 1950     Date: 2017        Subjective:   17    Patient is a 61 y.o. female called the office and requested to be seen today for productive cough, fever and chest congestion since Friday (6 days). Interval history:  Symptoms reviewed- Cough- productive with creamy green mucus. SOB with activity and after climbing 10 steps,  No SOB at rest.  Chest heaviness and congestion, tried to use  () inhalers ( Advair and Albuterol), without relief. She report use Advair only on seasonal change and Albuterol rarely. She report low grade temp 99. 6'F (per pt normal temp usually in 80 'F), no chills, nausea and vomiting. She denies hemoptysis     She denies pedal edema. Medication compliance- inhalers both      She report tripped and  fell few wk ago, left UE on cast-follow by ortho. ( Dr Eva Villagomez).     Past Medical History:   Diagnosis Date    ADD (attention deficit disorder)     [facility] psych    Amebic colitis 1970s    Arthritis     s/p cortisone left knee    Bronchiectasis     Dr. Feli Pantoja; RLL    FHx: breast cancer     Fibrocystic breast disease     Dr. Nicolas Galicia GERD (gastroesophageal reflux disease)     Dr. Jose Mosqueda s/p dilation     H/O pulmonary function tests     ratio 70, FEV1 81 no change postbd, TLC 93, RV 94, DLCO 81    Hyperlipidemia     Insomnia     Left rotator cuff tear     conservative therapy    Osteopenia     DEXA t-score -1.2 spine, -0.2 hip ();  spine -1.2, hip 0.1 (10/10);  -1.8 spine, -0.3  ()    Spontaneous pneumothorax 1970s    x3    Type II or unspecified type diabetes mellitus      on basis of elev a1c     Zoster right S1 3/12     Social History     Social History    Marital status:      Spouse name: N/A    Number of children: 2    Years of education: N/A Occupational History   2021 [employer]     Social History Main Topics    Smoking status: Former Smoker     Packs/day: 1.00     Years: 5.00     Quit date: 8/1/1973    Smokeless tobacco: Never Used    Alcohol use No    Drug use: No    Sexual activity: Not on file     Other Topics Concern    Not on file     Social History Narrative      Past Surgical History:   Procedure Laterality Date    BREAST SURGERY PROCEDURE UNLISTED  2/11    Left nipple duct exploration and excisional biopsy    CARDIAC SURG PROCEDURE UNLIST  4/14    Thallim negative, ef 75%    HX BREAST BIOPSY  4/12    Right Breast biopsy w/needle    HX COLONOSCOPY      Dr. Patrice Abdullahi negative 2005; diverticulosis 12/13 Dr Dawna Membreno    fibroids/ovarian cysts - Dr. Milana Huntley [Moxifloxacin] Other (comments)     Felt jittery    Crestor [Rosuvastatin] Myalgia    Evista [Raloxifene] Other (comments)     Severe flashes    Lipitor [Atorvastatin] Other (comments)     Leg cramps and muscle aches    Macrobid [Nitrofurantoin Monohyd/M-Cryst] Hives    Neuromuscular Blockers, Steroidal Other (comments)     \"patient feels crazy\"       Current Outpatient Prescriptions   Medication Sig Dispense Refill    HYDROcodone-acetaminophen (NORCO) 5-325 mg per tablet Take 1-2 Tabs by mouth every four (4) hours as needed for Pain. Max Daily Amount: 12 Tabs. 40 Tab 0    temazepam (RESTORIL) 30 mg capsule TAKE 1 CAPSULE BY MOUTH AT BEDTIME AS NEEDED 30 Cap 1    metFORMIN ER (GLUCOPHAGE XR) 500 mg tablet Take 2 Tabs by mouth daily (with dinner). 180 Tab 3    pravastatin (PRAVACHOL) 40 mg tablet Take 1 Tab by mouth nightly. 90 Tab 3    DEXILANT 60 mg CpDB Take 60 mg by mouth daily. 2    b complex vitamins (B COMPLEX 1) tablet Take 1 Tab by mouth daily.  Cholecalciferol, Vitamin D3, (VITAMIN D3) 1,000 unit cap Take  by mouth.       fluticasone-salmeterol (ADVAIR DISKUS) 250-50 mcg/dose diskus inhaler Take 1 Puff by inhalation every twelve (12) hours. 1 Inhaler 3    albuterol (VENTOLIN HFA) 90 mcg/actuation inhaler Take 2 Puffs by inhalation every six (6) hours as needed for Wheezing. 1 Inhaler 3    IBUPROFEN (ADVIL PO) Take  by mouth as needed. Patient Active Problem List   Diagnosis Code    Bronchiectasis Dr. Lord Talley J47.9    Osteopenia 8/13 FRAX 6.3 and 0.2 M85.80    Hyperlipidemia E78.5    Gastroesophageal reflux disease without esophagitis K21.9    Diabetes mellitus type 2, controlled ([facility] [address] E11.9    Family history of malignant neoplasm of breast Z80.3    Malignant neoplasm of right female breast ([facility] [address] C50.911        Review of Systems:  HEENT: No epistaxis, no nasal drainage, no difficulty in swallowing, no redness in eyes  Respiratory: as above  Cardiovascular: no chest pain, no palpitations, no chronic leg edema, no syncope  Gastrointestinal: no abd pain, no vomiting, no diarrhea, no bleeding symptoms  Genitourinary: No urinary symptoms or hematuria  Integument/breast: No ulcers or rashes  Musculoskeletal:Neg  Neurological: No focal weakness, no seizures, no headaches  Behvioral/Psych: No anxiety, no depression  Constitutional: No fever, no chills, no weight loss, no night sweats     Objective: There were no vitals taken for this visit. Physical Exam:   General: Comfortable, no acute distress, BMI 24  HEENT: Pupils reactive, sclera anicteric, EOM intact  Neck: No adenopathy or thyroid swelling, no JVD, supple.    CVS: S1S2, no murmurs  RS: Mod AE bilaterally, no accessory muscle use, few crackles R>L, no wheezing  Abd: Soft, non tender, no hepatosplenomegaly  Neuro: Non focal, awake, alert  Extrm: No leg edema, clubbing or cyanosis, Right arm with cast.  Skin: No rash, warm and dry    Data review:     Hospital Outpatient Visit on 12/20/2016   Component Date Value Ref Range Status    Creatinine (POC) 12/20/2016 0.7  0.6 - 1.3 MG/DL Final  GFR-AA (POC) 12/20/2016 >60  >60 ml/min/1.73m2 Final    GFR, non-AA (POC) 12/20/2016 >60  >60 ml/min/1.73m2 Final    Comment: Estimated GFR is calculated using the IDMS-traceable Modification of Diet in Renal Disease (MDRD) Study equation, reported for both  Americans (GFRAA) and non- Americans (GFRNA), and normalized to 1.73m2 body surface area. The physician must decide which value applies to the patient. The MDRD study equation should only be used in individuals age 25 or older. It has not been validated for the following: pregnant women, patients with serious comorbid conditions, or on certain medications, or persons with extremes of body size, muscle mass, or nutritional status. Lab Only on 10/19/2016   Component Date Value Ref Range Status    WBC 10/19/2016 8.9  4.0 - 11.0 K/uL Final    RBC 10/19/2016 4.37  3.80 - 5.20 M/uL Final    HGB 10/19/2016 12.3  11.7 - 16.0 g/dL Final    HCT 10/19/2016 39.2  35.1 - 48.0 % Final    MCV 10/19/2016 90  80 - 95 fL Final    MCH 10/19/2016 28  26 - 34 pg Final    MCHC 10/19/2016 31* 32 - 36 g/dL Final    RDW 10/19/2016 15.2  10.0 - 16.0 % Final    PLATELET 82/30/2345 746  140 - 440 K/uL Final    MPV 10/19/2016 9.9  6.0 - 10.8 fL Final    NEUTROPHILS 10/19/2016 68  40 - 75 % Final    Lymphocytes 10/19/2016 24* 27 - 45 % Final    MONOCYTES 10/19/2016 6  3 - 9 % Final    EOSINOPHILS 10/19/2016 1  0 - 6 % Final    BASOPHILS 10/19/2016 0  0 - 2 % Final    ABS. NEUTROPHILS 10/19/2016 6.1  1.8 - 7.7 K/uL Final    ABSOLUTE LYMPHOCYTE COUNT 10/19/2016 2.1  1.0 - 4.8 K/uL Final    ABS. MONOCYTES 10/19/2016 0.6  0.1 - 0.9 K/uL Final    ABS. EOSINOPHILS 10/19/2016 0.1  0.0 - 0.5 K/uL Final    ABS.  BASOPHILS 10/19/2016 0.0  0.0 - 0.2 K/uL Final    Glucose 10/19/2016 137* 65 - 99 mg/dL Final    BUN 10/19/2016 15  6 - 22 mg/dL Final    Creatinine 10/19/2016 0.5* 0.8 - 1.4 mg/dL Final    Sodium 10/19/2016 140  133 - 145 mmol/L Final    Potassium 10/19/2016 4.4  3.5 - 5.5 mmol/L Final    Chloride 10/19/2016 100  98 - 110 mmol/L Final    CO2 10/19/2016 24  20 - 32 mmol/L Final    AST (SGOT) 10/19/2016 15  10 - 37 U/L Final    ALT (SGPT) 10/19/2016 17  5 - 40 U/L Final    Alk. phosphatase 10/19/2016 66  40 - 120 U/L Final    Bilirubin, total 10/19/2016 <0.1* 0.2 - 1.2 mg/dL Final    Calcium 10/19/2016 9.4  8.4 - 10.5 mg/dL Final    Protein, total 10/19/2016 6.2  6.2 - 8.1 g/dL Final    Albumin 10/19/2016 4.2  3.5 - 5.0 g/dL Final    A-G Ratio 10/19/2016 2.1  1.1 - 2.6 ratio Final    Globulin 10/19/2016 2.0  2.0 - 4.0 g/dL Final    Anion gap 10/19/2016 16.0  mmol/L Final    Comment: Test includes Albumin, Alkaline Phosphatase, ALT, AST, BUN, Calcium, CO2,  Chloride, Creatinine, Glucose, Potassium, Sodium, Total Bilirubin and Total  Protein. Estimated GFR results are reported in mL/min/1.73 sq.m. by the MDRD equation. This eGFR is validated for stable chronic renal failure patients. This   equation  is unreliable in acute illness or patients with normal renal function.  GFRAA 10/19/2016 >60.0  >60.0 Final    GFRNA 10/19/2016 >60.0  >60.0 Final    Hep C Virus Ab 10/19/2016 None Detected  None Detec Final    Triglyceride 10/19/2016 302* 40 - 149 mg/dL Final    HDL Cholesterol 10/19/2016 54  40 - 59 mg/dL Final    Cholesterol, total 10/19/2016 176  110 - 200 mg/dL Final    LDL, calculated 10/19/2016 62  50 - 99 mg/dL Final    VLDL, calculated 10/19/2016 60* 8 - 30 mg/dL Final    Comment: Test includes cholesterol, HDL cholesterol, triglycerides and LDL.   Cholesterol Recommended NCEP guidelines in mg/dL:  Less than 200      Desirable  200 - 239          Borderline High  Greater than or  = to 240   High      Creatinine, urine 10/19/2016 137  mg/dL Final    Microalbumin, urine 10/19/2016 <12.0  0.1 - 17.0 ug/mL Final    Microalb/Creat ratio (ug/mg creat.) 10/19/2016   0.0 - 30.0 mcg/mg of Creatinine Final    ** Unable to calculate Microablumin/Creatinine Ratio due to low Microalbumin   Orders Only on 10/19/2016   Component Date Value Ref Range Status    Hemoglobin A1c 10/19/2016 6.2* 4.8 - 5.9 % Final    AVG GLU 10/19/2016 132* 91 - 123 mg/dL Final     Imaging:  I have personally reviewed the patients radiographs and have reviewed the reports:  XR Results (most recent):    Results from Hospital Encounter encounter on 04/04/14   XR CHEST PORT   Narrative Chest One View    CPT CODE: 43828    HISTORY: Tightness of chest    COMPARISON: 02/20/2007 chest radiograph. FINDINGS:    AP portable upright chest.. Mild biapical stable pleural thickening is  demonstrated. .  The heart is normal in size. The mediastinum appears normal.  The lateral sulci are sharp. Bone structures appear unremarkable for age. .         Impression IMPRESSION:    No evidence for active cardiopulmonary disease. CT Results (most recent):  No results found for this or any previous visit.     Results for orders placed or performed during the hospital encounter of 04/04/14   EKG, 12 LEAD, INITIAL   Result Value Ref Range    Ventricular Rate 92 BPM    Atrial Rate 92 BPM    P-R Interval 160 ms    QRS Duration 92 ms    Q-T Interval 360 ms    QTC Calculation (Bezet) 445 ms    Calculated P Axis 64 degrees    Calculated R Axis 60 degrees    Calculated T Axis 61 degrees    Diagnosis       Normal sinus rhythm  ST & T wave abnormality, consider anterior ischemia  &/OR INFERIOR   SUBENDOCARDIAL ISCHEMIA  Abnormal ECG  No previous ECGs available  Confirmed by Laz Veras MD, -- ([id number]) on 4/4/2014 10:51:23 PM   Results for orders placed or performed in visit on 01/16/12   AMB POC EKG ROUTINE W/ 12 LEADS, INTER & REP    Impression    ekg showed sinus rhythm, nothing acute      US Results (most recent):    Results from Hospital Encounter encounter on 12/22/16   US BREAST RT LIMITED=<3 QUAD   Narrative DIGITAL RIGHT DIAGNOSTIC MAMMOGRAM with CAD  AND RIGHT BREAST ULTRASOUND  LIMITED    HISTORY: Abnormal screening breast MR which had been performed for strong family  history of breast and ovarian carcinoma. MR demonstrated 8 mm early enhancing  mass right breast 9:00 position 4 cm from the nipple    COMPARISON: MR breast 12/20/2016, mammogram 5/4/2015, 14. DIAGNOSTIC RIGHT DIGITAL MAMMOGRAM FINDINGS:    Technique: Digital  CC and MLO views of the right breast with additional spot  compression view in the CC and ML projection and a full breast ML view with  computer assisted detection, iCAD Second Look 7. 2- H     FINDINGS: No discrete focal mass is seen. There is no distortion . There are no  suspicious calcifications. There is heterogeneous fibroglandular tissue at this  location. TARGETED RIGHT BREAST ULTRASOUND FINDINGS:    Targeted ultrasound obtained at the 9 o'clock position 3 cm from the nipple  demonstrates background breast composition of  heterogeneous fibroglandular  border with more superficial homogeneous fat. There is an irregular hypoechoic  mass with increased color flow at this location. Measurement of 0.6 x 0.5 x 0.8  cm. Mildly prominent duct extends from the nipple to within 1 cm of this mass. .    A BB was placed over the mass and a repeat two-view mammogram demonstrates the  BB to lie over region of nodularity at the slightly superior lateral middle  third of the right breast 4.5 cm from the nipple at the approximate same  location as the abnormality demonstrated on the recent breast MR. Impression IMPRESSION:    Suspicious 8 mm irregular hypoechoic mass right breast 9:00 position measured 3  to 4 cm from the nipple likely correlates with the MR abnormal enhancing mass. Tissue core biopsy is recommended. This can be done with ultrasound guidance. A  tissue marker should be placed at the site of biopsy. The findings and recommendations were discussed with the patient at the time of  the exam who is in agreement.   The patient was referred to Erwin Reyes, [address] [facility], for notification of the ordering provider and facilitation of  scheduling. BIRADS 4: Suspicious abnormality, biopsy should be considered    The breasts are heterogenously dense, which may obscure small masses. No results found for: DDIME, DDMR, HDDIME, HDIME, XDDIMT, HDDQN, DDIMSQ          Ms. Adry Gomez has a reminder for a \"due or due soon\" health maintenance. I have asked that she contact her primary care provider for follow-up on this health maintenance. IMPRESSION:   · Bronchiectasis exacerbation trigger possible pneumonia vs bronchitis  · Productive cough  · Chest congestion  · Hx GERD  · DM  · Osteopenia        RECOMMENDATIONS:   · Augmentin 500-125 mg Q 12 hr, script send, reviewed actions and adverse reactions. · Continue Advair as previously px, script send, reviewed actions and adverse reactions, encouraged compliance. · Continue Albuterol PRN  · Discussed distinction between quick-relief and controlled medications. · Discussed medication dosage, use, side effects, and goals of treatment in detail. · Warning signs of respiratory distress were reviewed with the patient. · Discussed avoidance of precipitants. · Discussed technique for using MDIs  · Follow-up as scheduled with Dr Gerard Lewis, sooner should new symptoms or problems arise.   ·      Pippa Garcia NP

## 2017-04-12 NOTE — TELEPHONE ENCOUNTER
Pt c/o cough producing thick green mucus, increased SOB, wheezing and a low grade temp of 99.0. Pt reports s & s for 3 days. Discussed with Miley , NP, and pt is going to come in at 2:00PM today for a sick call. Patient also scheduled to follow up with Devin on 05/16/2017.

## 2017-04-12 NOTE — MR AVS SNAPSHOT
Visit Information Date & Time Provider Department Dept. Phone Encounter #  
 4/12/2017  2:00 PM Jacobo Meadows NP [employer] [phone] [id number] Your Appointments 4/17/2017  3:15 PM  
Follow Up with [facility] - [facility] ([address]) Appt Note: FU LEFT HAND [address], [address] 200 [facility]  
[phone] [address] [place] [address]  
  
    
 4/25/2017  7:55 AM  
LAB with [facility] NURSE VISIT [facility] ([address] [place] [address]) Appt Note: lab for rpe rd  
 [address] [facility], [address] Bernie[address] [place]  
  
   
 [address] [facility]  
  
    
 5/2/2017  1:00 PM  
PHYSICAL with Radha Bernal MD  
[facility] [address]) Appt Note: [address]  
 [address] [facility], [address] Bernie[address] [place]  
  
   
 [address], [facility]  
  
    
 5/16/2017  9:30 AM  
Follow Up with MD Diane Jaeger ([address] [facility]) Appt Note: f/u from 1/21/2016; sick call w/ S. Mrecy Durán on 04/12/2017;  
 [address] [place] [address], [address] [address]  
[phone]  
  
   
 [address] [place] [address], [address] [facility] [place] [address] Upcoming Health Maintenance Date Due DTaP/Tdap/Td series (1 - Tdap) 9/28/1973 ZOSTER VACCINE AGE 60> 9/28/2012 EYE EXAM RETINAL OR DILATED Q1 11/11/2014 Pneumococcal 19-64 Highest Risk (3 of 3 - PPSV23) 12/20/2016 HEMOGLOBIN A1C Q6M 4/19/2017 FOOT EXAM Q1 4/22/2017 MICROALBUMIN Q1 10/19/2017 LIPID PANEL Q1 10/19/2017 BREAST CANCER SCRN MAMMOGRAM 12/22/2018 COLONOSCOPY 4/22/2026 Allergies as of 4/12/2017  Review Complete On: 4/12/2017 By: Dev Jesus, RT Severity Noted Reaction Type Reactions Avelox [Moxifloxacin]  01/16/2012    Other (comments) Felt jittery Crestor [Rosuvastatin]  10/23/2014    Myalgia Evista [Raloxifene]    Other (comments) Severe flashes Lipitor [Atorvastatin]  11/07/2013   Side Effect Other (comments) Leg cramps and muscle aches Macrobid [Nitrofurantoin Monohyd/m-cryst]    [employer] Neuromuscular Blockers, Steroidal    Other (comments) \"patient feels crazy\" Current Immunizations  Reviewed on 10/25/2016 Name Date Influenza Vaccine 10/23/2014 Influenza Vaccine (Quad) PF 10/30/2015 Influenza Vaccine PF 10/2/2013 Influenza Vaccine Split 11/28/2012  9:38 AM, 1/16/2012  3:35 PM  
 Influenza Vaccine Whole 9/22/2010 Pneumococcal Conjugate (PCV-13) 10/25/2016 Pneumococcal Vaccine (Unspecified Type) 1/1/2008 TB Skin Test (PPD) 8/1/2009 Not reviewed this visit Vitals BP Pulse Temp Resp Height(growth percentile) Weight(growth percentile) 150/90 (BP 1 Location: Left arm, BP Patient Position: At rest) (!) 108 98.1 °F (36.7 °C) (Oral) 19 5' 3\" (1.6 m) 139 lb (63 kg) SpO2 BMI OB Status Smoking Status 98% 24.62 kg/m2 Postmenopausal Former Smoker BMI and BSA Data Body Mass Index Body Surface Area  
 24.62 kg/m 2 1.67 m 2 Preferred Pharmacy Pharmacy Name Phone [id number] [place] [address] [facility] [facility] [phone] Your Updated Medication List  
  
   
This list is accurate as of: 4/12/17  2:35 PM.  Always use your most recent med list. ADVIL PO Take  by mouth as needed. albuterol 90 mcg/actuation inhaler Commonly known as:  VENTOLIN HFA Take 2 Puffs by inhalation every six (6) hours as needed for Wheezing. B COMPLEX 1 tablet Generic drug:  b complex vitamins Take 1 Tab by mouth daily. DEXILANT 60 mg Cpdb Generic drug:  Dexlansoprazole Take 60 mg by mouth daily. fluticasone-salmeterol 250-50 mcg/dose diskus inhaler Commonly known as:  ADVAIR DISKUS Take 1 Puff by inhalation every twelve (12) hours. HYDROcodone-acetaminophen 5-325 mg per tablet Commonly known as:  Aguero Fryer Take 1-2 Tabs by mouth every four (4) hours as needed for Pain. Max Daily Amount: 12 Tabs. metFORMIN  mg tablet Commonly known as:  GLUCOPHAGE XR Take 2 Tabs by mouth daily (with dinner). pravastatin 40 mg tablet Commonly known as:  PRAVACHOL Take 1 Tab by mouth nightly. temazepam 30 mg capsule Commonly known as:  RESTORIL  
TAKE 1 CAPSULE BY MOUTH AT BEDTIME AS NEEDED  
  
 VITAMIN D3 1,000 unit Cap Generic drug:  cholecalciferol Take  by mouth. Patient Instructions Amoxicillin/Clavulanate Potassium (By mouth) Amoxicillin (r-fip-a-BOBBI-in), Clavulanate Potassium (OHUT-hn-qo-palma vqs-CST-kk-um) Treats infections. This medicine is a penicillin antibiotic. Brand Name(s):Augmentin, Augmentin ES-600, Augmentin XR There may be other brand names for this medicine. When This Medicine Should Not Be Used: This medicine is not right for everyone. Do not use it if you had an allergic reaction to amoxicillin, clavulanate, or a similar antibiotic (penicillin or cephalosporin), or if you had liver problems caused by Augmentin®. How to Use This Medicine:  
Liquid, Tablet, Chewable Tablet, Long Acting Tablet · Your doctor will tell you how much medicine to use. Do not use more than directed. · Take this medicine with a snack or at the beginning of a meal to help prevent nausea. · Chewable tablets: Chew the tablet completely before you swallow it. · Measure the oral liquid medicine with a marked measuring spoon, oral syringe, or medicine cup. Shake the medicine well just before you measure each dose. Rinse the spoon or dropper after each use. · Swallow the extended-release tablet whole. Do not crush, break, or chew it.  
· Take all of the medicine in your prescription to clear up your infection, even if you feel better after the first few doses. · Missed dose: Take a dose as soon as you remember. If it is almost time for your next dose, wait until then and take a regular dose. Do not take extra medicine to make up for a missed dose. · Tablet, extended-release tablet, chewable tablet: Store at room temperature, away from heat, moisture, and direct light. · Oral liquid: Store in the refrigerator. Do not freeze. · Throw away any unused oral liquid after 10 days. Drugs and Foods to Avoid: Ask your doctor or pharmacist before using any other medicine, including over-the-counter medicines, vitamins, and herbal products. · Some medicines can affect how this medicine works. Tell your doctor if you are taking a blood thinner (such as warfarin), allopurinol, or probenecid. Warnings While Using This Medicine: · Tell your doctor if you are pregnant or breastfeeding, or if you have kidney disease, liver disease, or mononucleosis (mono). · Birth control pills may not work as well while you are taking this medicine. Use another form of birth control to prevent pregnancy. · This medicine can cause diarrhea. Call your doctor if the diarrhea becomes severe, does not stop, or is bloody. Do not take any medicine to stop diarrhea until you have talked to your doctor. Diarrhea can occur 2 months or more after you stop taking this medicine. · Tell any doctor or dentist who treats you that you are using this medicine. This medicine may affect certain medical test results. · Call your doctor if your symptoms do not improve or if they get worse. · The chewable tablet and oral liquid contain phenylalanine. Talk to your doctor before you use this medicine if you have phenylketonuria (PKU). · Keep all medicine out of the reach of children. Never share your medicine with anyone. Possible Side Effects While Using This Medicine:  
Call your doctor right away if you notice any of these side effects: · Allergic reaction: Itching or hives, swelling in your face or hands, swelling or tingling in your mouth or throat, chest tightness, trouble breathing · Blistering, peeling, red skin rash · Change in how much or how often you urinate · Dark urine or pale stools, nausea, vomiting, loss of appetite, stomach pain, yellow eyes or skin · Diarrhea that may contain blood, stomach cramps If you notice these less serious side effects, talk with your doctor: · Diaper rash · Mild diarrhea, nausea, vomiting · Tooth discoloration (in children) If you notice other side effects that you think are caused by this medicine, tell your doctor. Call your doctor for medical advice about side effects. You may report side effects to FDA at 2-170-SZA-7249 © 2016 [address] is for End User's use only and may not be sold, redistributed or otherwise used for commercial purposes. The above information is an  only. It is not intended as medical advice for individual conditions or treatments. Talk to your doctor, nurse or pharmacist before following any medical regimen to see if it is safe and effective for you. Introducing [facility] & HEALTH SERVICES! Dear Kristen Cleary: Thank you for requesting a [employer] account. Our records indicate that you already have an active [employer] account. You can access your account anytime at [email or web address] Did you know that you can access your hospital and ER discharge instructions at any time in [employer]? You can also review all of your test results from your hospital stay or ER visit. Additional Information If you have questions, please visit the Frequently Asked Questions section of the [employer] website at [email or web address] Remember, [employer] is NOT to be used for urgent needs. For medical emergencies, dial 911. Now available from your iPhone and Android! Please provide this summary of care documentation to your next provider. Your primary care clinician is listed as Linn Wilder. If you have any questions after today's visit, please call [phone].

## 2017-04-12 NOTE — PATIENT INSTRUCTIONS
Amoxicillin/Clavulanate Potassium (By mouth)   Amoxicillin (l-fiu-u-BOBBI-in), Clavulanate Potassium (PIDD-xy-qf-palma elm-SSI-pg-um)  Treats infections. This medicine is a penicillin antibiotic. Brand Name(s):Augmentin, Augmentin ES-600, Augmentin XR   There may be other brand names for this medicine. When This Medicine Should Not Be Used: This medicine is not right for everyone. Do not use it if you had an allergic reaction to amoxicillin, clavulanate, or a similar antibiotic (penicillin or cephalosporin), or if you had liver problems caused by Augmentin®. How to Use This Medicine:   Liquid, Tablet, Chewable Tablet, Long Acting Tablet  · Your doctor will tell you how much medicine to use. Do not use more than directed. · Take this medicine with a snack or at the beginning of a meal to help prevent nausea. · Chewable tablets: Chew the tablet completely before you swallow it. · Measure the oral liquid medicine with a marked measuring spoon, oral syringe, or medicine cup. Shake the medicine well just before you measure each dose. Rinse the spoon or dropper after each use. · Swallow the extended-release tablet whole. Do not crush, break, or chew it. · Take all of the medicine in your prescription to clear up your infection, even if you feel better after the first few doses. · Missed dose: Take a dose as soon as you remember. If it is almost time for your next dose, wait until then and take a regular dose. Do not take extra medicine to make up for a missed dose. · Tablet, extended-release tablet, chewable tablet: Store at room temperature, away from heat, moisture, and direct light. · Oral liquid: Store in the refrigerator. Do not freeze. · Throw away any unused oral liquid after 10 days. Drugs and Foods to Avoid:   Ask your doctor or pharmacist before using any other medicine, including over-the-counter medicines, vitamins, and herbal products. · Some medicines can affect how this medicine works.  Tell your doctor if you are taking a blood thinner (such as warfarin), allopurinol, or probenecid. Warnings While Using This Medicine:   · Tell your doctor if you are pregnant or breastfeeding, or if you have kidney disease, liver disease, or mononucleosis (mono). · Birth control pills may not work as well while you are taking this medicine. Use another form of birth control to prevent pregnancy. · This medicine can cause diarrhea. Call your doctor if the diarrhea becomes severe, does not stop, or is bloody. Do not take any medicine to stop diarrhea until you have talked to your doctor. Diarrhea can occur 2 months or more after you stop taking this medicine. · Tell any doctor or dentist who treats you that you are using this medicine. This medicine may affect certain medical test results. · Call your doctor if your symptoms do not improve or if they get worse. · The chewable tablet and oral liquid contain phenylalanine. Talk to your doctor before you use this medicine if you have phenylketonuria (PKU). · Keep all medicine out of the reach of children. Never share your medicine with anyone. Possible Side Effects While Using This Medicine:   Call your doctor right away if you notice any of these side effects:  · Allergic reaction: Itching or hives, swelling in your face or hands, swelling or tingling in your mouth or throat, chest tightness, trouble breathing  · Blistering, peeling, red skin rash  · Change in how much or how often you urinate  · Dark urine or pale stools, nausea, vomiting, loss of appetite, stomach pain, yellow eyes or skin  · Diarrhea that may contain blood, stomach cramps  If you notice these less serious side effects, talk with your doctor:   · Diaper rash  · Mild diarrhea, nausea, vomiting  · Tooth discoloration (in children)  If you notice other side effects that you think are caused by this medicine, tell your doctor. Call your doctor for medical advice about side effects.  You may report side effects to FDA at 7-998-FDA-3070  © 2016 [address] is for End User's use only and may not be sold, redistributed or otherwise used for commercial purposes. The above information is an  only. It is not intended as medical advice for individual conditions or treatments. Talk to your doctor, nurse or pharmacist before following any medical regimen to see if it is safe and effective for you.

## 2017-04-17 ENCOUNTER — OFFICE VISIT (OUTPATIENT)
Dept: ORTHOPEDIC SURGERY | Age: 67
End: 2017-04-17

## 2017-04-17 VITALS
BODY MASS INDEX: 24.63 KG/M2 | HEART RATE: 95 BPM | TEMPERATURE: 97.1 F | DIASTOLIC BLOOD PRESSURE: 68 MMHG | SYSTOLIC BLOOD PRESSURE: 133 MMHG | WEIGHT: 139 LBS | HEIGHT: 63 IN

## 2017-04-17 DIAGNOSIS — S62.025G CLOSED NONDISPLACED FRACTURE OF MIDDLE THIRD OF SCAPHOID OF LEFT WRIST WITH DELAYED HEALING, SUBSEQUENT ENCOUNTER: Primary | ICD-10-CM

## 2017-04-17 NOTE — PROGRESS NOTES
Armida Chaudhry  1950   Chief Complaint   Patient presents with    Wrist Pain     left        HISTORY OF PRESENT ILLNESS  Armida Chaudhry is a 61 y.o. female who presents today for reevaluation of left wrist pain. Today she presents in a cast. States her pain is a 4/10. Her date of injury was around 3/21/17. Patient denies any fever, chills, chest pain, shortness of breath or calf pain. There are no new illness or injuries to report since last seen in the office. There are no changes to medications, allergies, family or social history. PHYSICAL EXAM:   Visit Vitals    /68 (BP 1 Location: Right arm, BP Patient Position: Sitting)    Pulse 95    Temp 97.1 °F (36.2 °C) (Oral)    Ht 5' 3\" (1.6 m)    Wt 139 lb (63 kg)    BMI 24.62 kg/m2     The patient is a well-developed, well-nourished female   in no acute distress. The patient is alert and oriented times three. The patient is alert and oriented times three. Mood and affect are normal.  LYMPHATIC: lymph nodes are not enlarged and are within normal limits  SKIN: normal in color and non tender to palpation. There are no bruises or abrasions noted. NEUROLOGICAL: Motor sensory exam is within normal limits. Reflexes are equal bilaterally.  There is normal sensation to pinprick and light touch  MUSCULOSKELETAL:  Examination Left Hand   Skin Intact   Deformity -   Swelling -   Tenderness -   Tenderness A1 Pulley -   Finger flexion Full   Finger extension Full   Thenar Eminence Atrophy -   Sensation Normal   Capillary refill -   Heberden's nodes -   Dupuytren's -     Examination Left Wrist   Skin Intact   Tenderness No ttp   Flexion 30   Extension 30   Deformity -   Effusion -   Tinnel's sign -   Phalen's test -   Finklestein maneuver -   Pain with thumb abduction -     IMAGING: 3 view xray of left wrist reveal small increase of calcification along scaphoid    IMPRESSION:      ICD-10-CM ICD-9-CM    1. Closed nondisplaced fracture of middle third of scaphoid of left wrist with delayed healing, subsequent encounter S62.025G V54.19         PLAN:    1. Cast removed today  2. Will put in a removable splint  3. Will refer to OT  4.  RTC 2-3 weeks    LOWELL Erickson [facility]

## 2017-04-21 ENCOUNTER — HOSPITAL ENCOUNTER (OUTPATIENT)
Dept: PHYSICAL THERAPY | Age: 67
Discharge: HOME | End: 2017-04-21
Payer: COMMERCIAL

## 2017-04-21 PROCEDURE — 97165 OT EVAL LOW COMPLEX 30 MIN: CPT

## 2017-04-21 PROCEDURE — 97110 THERAPEUTIC EXERCISES: CPT

## 2017-04-21 NOTE — PROGRESS NOTES
[facility]  [employer] OF [address]  [phone] fax    Plan of Care/Statement of Necessity for Occupational Therapy Services    Patient name: Viet Porter Start of Care: 2017   Referral source: Symone Winkler : 1950    Medical Diagnosis: Nondisplaced fracture of middle third of navicular (scaphoid) bone of left wrist, subsequent encounter for fracture with delayed healing [S62.025G]   Onset Date:17    Treatment Diagnosis: Pain L wrist   Prior Hospitalization: see medical history Provider#: [id number]   Medications: Verified on Patient summary List    Comorbidities: DM, L radius fx, mastectomy   Prior Level of Function: i self care home care driving, works in [address] [place] [address] at [employer], [address] and following information is based on the information from the initial evaluation. Assessment/ key information: 61year old female who fell  and had L radius fracture. Further evaluation later discovered scaphoid fracture. She was casted until . She reports pain 2/10 and precautions including not using hand for any lifting. She has limited AROM of 40 supination, 22 wrist flexion, 55 wrist extension, 10 ulnar and 8 radial deviation. Her thumb AROM is limited to 50 at MP and 45 at IP flexion, radial abduction of 40, palmar 42. She has difficulty with fasteners, pulling up pants, fixing hair, cutting and performing all home care and some work tasks such as typing. Her FOTO is 35/100 reflecting very severe impairment in function. She will benefit from skilled occupational therapy to improve L wrist and thumb AROM for self care tasks.     Evaluation Complexity: History LOW Complexity : Brief history review  Examination LOW Complexity : 1-3 performance deficits relating to physical, cognitive , or psychosocial skils that result in activity limitations and / or participation restrictions  Clinical Decision Making LOW Complexity : No comorbidities that affect functional and no verbal or physical assistance needed to complete eval tasks   Overall Complexity Rating: LOW     Problem List: Pain effecting function, Decreased range of motion, Decreased strength, Decreased coordination/prehension, Decreased ADL/functional abilities  and Decreased activity tolerance     Treatment Plan may include any combination of the following: Therapeutic exercise, Therapeutic activities, Physical agent/modality, Manual therapy, Patient education and ADLs/IADLs    Patient / Family readiness to learn indicated by: asking questions, trying to perform skills and interest    Persons(s) to be included in education:   patient (P)    Barriers to Learning/Limitations: None    Patient Goal (s): range of motion    Patient Self Reported Health Status: good    Rehabilitation Potential: excellent    Short Term Goals: To be accomplished in 1 weeks:  1. Patient will be independent in home exercise program for wrist forearm and thumb AROM. 2.  Patient will be able to supinate to gather coins in hand. 3.  Patient will increase wrist BIGGS in flexion-extension by at least  40 degrees    Long Term Goals: To be accomplished in 9 treatments:   1. Patient will achieve at least 60 degrees of supination for carrying items. 2.  Patient will improve deviation of wrist by at least 20 degrees for combing hair and typing. 3.  Patient will report improved L hand function in daily tasks as shown by increae in FOTO of at least  20 points.       Frequency / Duration: Patient to be seen 2-3 times per week for 9 treatments:    Patient/ Caregiver education and instruction: Diagnosis, prognosis, self care, activity modification and exercises   [x]  Plan of care has been reviewed with JAMIE Talley 4/21/2017 11:57 AM    _____________________________________________________________________    I certify that the above Therapy Services are being furnished while the patient is under my care. I agree with the treatment plan and certify that this therapy is necessary.     Physician's Signature:____________________  Date:____________Time:__________    Please sign and return to [facility]  [facility] Nick Leach  [phone] fax

## 2017-04-21 NOTE — PROGRESS NOTES
OT DAILY TREATMENT NOTE  3-16    Patient Name: Josefina Franco  Date:2017  : 1950  [x]  Patient  Verified  Payor: Leo Burroughs / Plan: [employer] / Product Type: HMO /    In time:1110  Out time:1150  Total Treatment Time (min): 40  Visit #: 1 of 9    Treatment Area: Nondisplaced fracture of middle third of navicular (scaphoid) bone of left wrist, subsequent encounter for fracture with delayed healing [S62.025G]    SUBJECTIVE  Pain Level (0-10 scale): 2/10  Any medication changes, allergies to medications, adverse drug reactions, diagnosis change, or new procedure performed?: [x] No    [] Yes (see summary sheet for update)  Subjective functional status/changes:   [] No changes reported  Feels stiff    OBJECTIVE      10 min Therapeutic Exercise:  [] See flow sheet :   Rationale: increase ROM to improve the patients ability to move wrist and thumb  Wrist and thumb AROm HEP      With   [x] TE   [] TA   [] neuro   [] other: Patient Education: [x] Review HEP    [x] Progressed/Changed HEP based on: Wrist and thumb AROM  [] positioning   [] body mechanics   [] transfers   [x] heat/ice application   [] Splint wear/care   [] Sensory re-education   [] scar management      [] other:             Other Objective/Functional Measures:   Subjective: pt is a right hand dominant, 61 y.o.y/o, female who sustained his/her injury *17* with radius fracture, one month later discovered  Scaphoid fracture on MRI. Recasted for 2 weeks, removed Monday.    Prior level of function: [employer], travel, grandkids, reading, [employer], drive, i self care home care, spouse does most home care now  Pain level:(0-no pain 10-debilitating pain) mild    Description/Location: left thumb and left wrist with c/o intermittent relief   Worst pain9/10 Least pain 2/10   Activities which aggravate pain: lifting, pulling   Activities which ease pain: rest, ice helps  Current functional limitations/living situation: With  in house, lifting, carrying, pulling, flushing toilet, rotation. Medical hx: DM    Medications: See the written copy of this report in the patient's paper medical record.        Objective:  Range of Motion:     Active Passive     Norms Right Left Right Left   Shoulder Flex 0-180        Ext 0-60        abd 0-180        Horizontal add 0-45        IR 0-70        ER 0-90       Elbow Ext/flex 0-150       Forearm Supination 0-80 80 40      Pronation 0-80       Wrist Flex 0-80 68 22      Ext 0-70 80 55      Ulnar Dev 0-30 40 10      Radial Dev 0-20 28 8         Hand ROM R/L   Index Middle Ring Small Thumb    MP      CMC   PIP     60/50 MP   DIP     85/45 IP        50/42 Hunt Abd        62/40 Radial Abd     Hand Strength:   Gross Grasp 3pt Pinch Lateral Pinch Tip Pinch   Right  52 10 12 8   Left         Nine-Hole Peg Test:  Left= __22___seconds  Right=__16___seconds  Finger Opposition:to tips not to base    ADLs  Feeding:        []MaxA   []ModA   [x]Wilber   [] CGA   []SBA   []Lorraine   []Independent  UE Dressing:       []MaxA   []ModA   [x]Wilber   [] CGA   []SBA   []Lorraine   []Independent  LE Dressing:       []MaxA   []ModA   [x]Wilber   [] CGA   []SBA   []Lorraine   []Independent  Grooming:       []MaxA   []ModA   [x]Wilber   [] CGA   []SBA   []Lorraine   []Independent  Toileting:       []MaxA   []ModA   [x]Wilber   [] CGA   []SBA   []Lorraine   []Independent  Bathing:       []MaxA   []ModA   [x]Wilber   [] CGA   []SBA   []Lorraine   []Independent  Light Meal Prep:    []MaxA   [x]ModA   []Wilber   [] CGA   []SBA   []Lorraine   []Independent  Household/Other: []MaxA   [x]ModA   []Wilber   [] CGA   []SBA   []Lorraine   []Independent  Adaptive Equip:     []MaxA   []ModA   []Wilber   [] CGA   []SBA   []Lorraine   []Independent  Driving:       []MaxA   []ModA   [x]Wilber   [] CGA   []SBA   []Lorraine   []Independent  Money Mgmt:        []MaxA   []ModA   [x]Wilber   [] CGA   []SBA   []Lorraine   []Independent       Pain Level (0-10 scale) post treatment: 2/10    ASSESSMENT/Changes in Function:    [x]  See Plan of Care  []  See progress note/recertification  []  See Discharge Summary             PLAN  []  Upgrade activities as tolerated     []  Continue plan of care  []  Update interventions per flow sheet       []  Discharge due to:_  []  Other:_      Faith Mcdaniel OTR/L 4/21/2017  10:59 AM    Future Appointments  Date Time Provider Grant Natarajan   4/21/2017 11:00 AM Faith Mcdaniel OTR/SEDRICK [facility]   4/25/2017 7:55 AM IOC NURSE VISIT [facility]   5/2/2017 1:00 PM Nabor Westbrook MD [facility]   5/10/2017 4:15 PM Brittanie Faulkner MD [facility]   5/16/2017 9:30 AM Camilla Markham MD [place] [address]   5/24/2017 2:00 PM [facility]OG[facility]

## 2017-04-24 ENCOUNTER — HOSPITAL ENCOUNTER (OUTPATIENT)
Dept: PHYSICAL THERAPY | Age: 67
Discharge: HOME | End: 2017-04-24
Payer: COMMERCIAL

## 2017-04-24 DIAGNOSIS — E11.9 CONTROLLED TYPE 2 DIABETES MELLITUS WITHOUT COMPLICATION, WITHOUT LONG-TERM CURRENT USE OF INSULIN (HCC): ICD-10-CM

## 2017-04-24 PROCEDURE — 97018 PARAFFIN BATH THERAPY: CPT

## 2017-04-24 PROCEDURE — 97530 THERAPEUTIC ACTIVITIES: CPT

## 2017-04-24 PROCEDURE — 97110 THERAPEUTIC EXERCISES: CPT

## 2017-04-24 NOTE — PROGRESS NOTES
OT DAILY TREATMENT NOTE  3-16    Patient Name: Bin Washburn  Date:2017  : 1950  [x]  Patient  Verified  Payor: Elijah Osborn / Plan: [employer] HMO / Product Type: HMO /    In time:330  Out time:410  Total Treatment Time (min): 40  Visit #: 2 of 9    Treatment Area: Nondisplaced fracture of middle third of navicular (scaphoid) bone of left wrist, subsequent encounter for fracture with delayed healing [S62.025G]    SUBJECTIVE  Pain Level (0-10 scale): 0/10  Any medication changes, allergies to medications, adverse drug reactions, diagnosis change, or new procedure performed?: [x] No    [] Yes (see summary sheet for update)  Subjective functional status/changes:   [] No changes reported  Did my exercises 2x each day over the weekend, did them once so far today    OBJECTIVE    Modality rationale: decrease pain and increase tissue extensibility to improve the patients ability to grasp   Min Type Additional Details    [] Estim:  []Unatt       []IFC  []Premod                        []Other:  []w/ice   []w/heat  Position:  Location:    [] Estim: []Att    []TENS instruct  []NMES                    []Other:  []w/US   []w/ice   []w/heat  Position:  Location:    []  Traction: [] Cervical       []Lumbar                       [] Prone          []Supine                       []Intermittent   []Continuous Lbs:  [] before manual  [] after manual    []  Ultrasound: []Continuous   [] Pulsed                           []1MHz   []3MHz W/cm2:  Location:    []  Iontophoresis with dexamethasone         Location: [] Take home patch   [] In clinic    []  Ice     []  heat  []  Ice massage  []  Laser   []  Anodyne Position:  Location:     10 []  Laser with stim  [x]  Other: Paraffin Position:  Location:L hand    []  Vasopneumatic Device Pressure:       [] lo [] med [] hi   Temperature: [] lo [] med [] hi   [x] Skin assessment post-treatment:  [x]intact []redness- no adverse reaction    []redness  adverse reaction:     15 min Therapeutic Exercise:  [] See flow sheet :   Rationale: increase ROM to improve the patients ability to move wrist  Added wrist mazes 5x each  Supinator wheel x 10  Wrist flex ext and RD/UD x 10    15 min Therapeutic Activity:  []  See flow sheet :   Rationale: improve coordination  to improve the patients ability to supinate and grasp  1/4 in pegs palm to digit and recip opp to remove x 40       With   [] TE   [] TA   [] neuro   [] other: Patient Education: [x] Review HEP    [] Progressed/Changed HEP based on:   [] positioning   [] body mechanics   [] transfers   [] heat/ice application   [] Splint wear/care   [] Sensory re-education   [] scar management      [] other:             Other Objective/Functional Measures:   Able to maintain grasp on pegs with good success     Pain Level (0-10 scale) post treatment: 0/10    ASSESSMENT/Changes in Function: Improved supination    Patient will continue to benefit from skilled OT services to modify and progress therapeutic interventions, address ROM deficits, analyze and cue movement patterns and instruct in home and community integration to attain remaining goals. []  See Plan of Care  []  See progress note/recertification  []  See Discharge Summary         Progress towards goals / Updated goals:  1. Patient will be independent in home exercise program for wrist forearm and thumb AROM. met 4/24/17  2. Patient will be able to supinate to gather coins in hand. progressing  3. Patient will increase wrist BIGGS in flexion-extension by at least  40 degrees    Long Term Goals: To be accomplished in 9 treatments:   1. Patient will achieve at least 60 degrees of supination for carrying items. 2.  Patient will improve deviation of wrist by at least 20 degrees for combing hair and typing. 3.  Patient will report improved L hand function in daily tasks as shown by increae in FOTO of at least  20 points.       PLAN  [x]  Upgrade activities as tolerated     [x]  Continue plan of care  []  Update interventions per flow sheet       []  Discharge due to:_  []  Other:_      Valdene Found, OTR/L 4/24/2017  4:45 PM    Future Appointments  Date Time Provider Grant Natarajan   4/25/2017 7:55 AM IOC NURSE VISIT [facility]   4/26/2017 4:30 PM Valdene Found, OTR/L [facility]   5/1/2017 3:30 PM Valdene Found, OTR/L [facility]   5/2/2017 1:00 PM Ricardo Cortez MD [facility]   5/3/2017 3:30 PM Valdene Found, OTR/L [facility]   5/8/2017 3:30 PM Valdene Found, OTR/L [facility]   5/10/2017 4:15 PM Kitty Beltre MD [facility]   5/16/2017 9:30 AM Antwan Bailey MD [place] [address]   5/24/2017 2:00 PM [facility]ATION [facility]COLOG[facility]

## 2017-04-25 LAB
HBA1C MFR BLD HPLC: 6.8 %
LDL-C, EXTERNAL: 122

## 2017-04-25 RX ORDER — TEMAZEPAM 30 MG/1
CAPSULE ORAL
Qty: 30 CAP | Refills: 1 | OUTPATIENT
Start: 2017-04-25 | End: 2017-04-25 | Stop reason: SDUPTHER

## 2017-04-26 ENCOUNTER — HOSPITAL ENCOUNTER (OUTPATIENT)
Dept: PHYSICAL THERAPY | Age: 67
Discharge: HOME | End: 2017-04-26
Payer: COMMERCIAL

## 2017-04-26 DIAGNOSIS — E11.9 CONTROLLED TYPE 2 DIABETES MELLITUS WITHOUT COMPLICATION, WITHOUT LONG-TERM CURRENT USE OF INSULIN (HCC): ICD-10-CM

## 2017-04-26 LAB
AVG GLU, 10930: 149 MG/DL (ref 91–123)
CHOLEST SERPL-MCNC: 223 MG/DL (ref 110–200)
HBA1C MFR BLD HPLC: 6.8 % (ref 4.8–5.9)
HDLC SERPL-MCNC: 53 MG/DL (ref 40–59)
LDLC SERPL CALC-MCNC: 122 MG/DL (ref 50–99)
TRIGL SERPL-MCNC: 240 MG/DL (ref 40–149)
VLDLC SERPL CALC-MCNC: 48 MG/DL (ref 8–30)

## 2017-04-26 PROCEDURE — 97110 THERAPEUTIC EXERCISES: CPT

## 2017-04-26 PROCEDURE — 97018 PARAFFIN BATH THERAPY: CPT

## 2017-04-26 PROCEDURE — 97530 THERAPEUTIC ACTIVITIES: CPT

## 2017-04-26 RX ORDER — TEMAZEPAM 30 MG/1
CAPSULE ORAL
Qty: 30 CAP | Refills: 1 | Status: SHIPPED | OUTPATIENT
Start: 2017-04-26 | End: 2017-06-26 | Stop reason: SDUPTHER

## 2017-04-26 NOTE — PROGRESS NOTES
OT DAILY TREATMENT NOTE  3-16    Patient Name: Darell Elena  Date:2017  : 1950  [x]  Patient  Verified  Payor: Jose Francisco Aquino / Plan: [address] [employer] / Product Type: HMO /    In time:435  Out time:515  Total Treatment Time (min): 40  Visit #: 3 of 9    Treatment Area: Nondisplaced fracture of middle third of navicular (scaphoid) bone of left wrist, subsequent encounter for fracture with delayed healing [S62.122G]    SUBJECTIVE  Pain Level (0-10 scale): 0/10  Any medication changes, allergies to medications, adverse drug reactions, diagnosis change, or new procedure performed?: [x] No    [] Yes (see summary sheet for update)  Subjective functional status/changes:   [] No changes reported  i can see it is getting better  I am starting to use my hand to type some now    OBJECTIVE    Modality rationale: decrease pain and increase tissue extensibility to improve the patients ability to grasp   Min Type Additional Details    [] Estim:  []Unatt       []IFC  []Premod                        []Other:  []w/ice   []w/heat  Position:  Location:    [] Estim: []Att    []TENS instruct  []NMES                    []Other:  []w/US   []w/ice   []w/heat  Position:  Location:    []  Traction: [] Cervical       []Lumbar                       [] Prone          []Supine                       []Intermittent   []Continuous Lbs:  [] before manual  [] after manual    []  Ultrasound: []Continuous   [] Pulsed                           []1MHz   []3MHz W/cm2:  Location:    []  Iontophoresis with dexamethasone         Location: [] Take home patch   [] In clinic    []  Ice     []  heat  []  Ice massage  []  Laser   []  Anodyne Position:  Location:     10 []  Laser with stim  [x]  Other: Paraffin Position:  Location:L hand    []  Vasopneumatic Device Pressure:       [] lo [] med [] hi   Temperature: [] lo [] med [] hi   [x] Skin assessment post-treatment:  []intact []redness- no adverse reaction    []redness  adverse reaction:     15 min Therapeutic Exercise:  [x] See flow sheet :   Rationale: increase ROM to improve the patients ability to move wrist  Added chinese balls x 10 with mod diff  Recheck ROM  Wrist mazes and sup wheel continued  15 min Therapeutic Activity:  [x]  See flow sheet :   Rationale: increase ROM and improve coordination  to improve the patients ability to manipulate items  1/4 in pegs palm to digit with good success       With   [] TE   [] TA   [] neuro   [] other: Patient Education: [x] Review HEP    [] Progressed/Changed HEP based on:   [] positioning   [] body mechanics   [] transfers   [] heat/ice application   [] Splint wear/care   [] Sensory re-education   [] scar management      [] other:             Other Objective/Functional Measures:   Wrist Ext 65  Flex 40     Pain Level (0-10 scale) post treatment: 0/10    ASSESSMENT/Changes in Function: Improved ROM    Patient will continue to benefit from skilled OT services to modify and progress therapeutic interventions, address ROM deficits, address strength deficits, analyze and address soft tissue restrictions and instruct in home and community integration to attain remaining goals. []  See Plan of Care  []  See progress note/recertification  []  See Discharge Summary         Progress towards goals / Updated goals:  1. Patient will be independent in home exercise program for wrist forearm and thumb AROM. met 4/24/17  2. Patient will be able to supinate to gather coins in hand. met 4/24/16  3. Patient will increase wrist BIGGS in flexion-extension by at least  40 degreesprogressing 4/26/17    Long Term Goals: To be accomplished in 9 treatments:   1. Patient will achieve at least 60 degrees of supination for carrying items. 2.  Patient will improve deviation of wrist by at least 20 degrees for combing hair and typing. 3.  Patient will report improved L hand function in daily tasks as shown by increae in FOTO of at least  20 points.         PLAN  [x]  Upgrade activities as tolerated     [x]  Continue plan of care  []  Update interventions per flow sheet       []  Discharge due to:_  []  Other:_      Anai Roman, OTR/L 4/26/2017  4:42 PM    Future Appointments  Date Time Provider Grant Natarajan   5/1/2017 3:30 PM Anai Roman, OTR/L [facility]   5/2/2017 1:00 PM Aurelio Esquivel MD [facility]   5/3/2017 3:30 PM Anai Roman, OTR/L [facility]   5/8/2017 3:30 PM Anai Roman, OTR/L [facility]   5/10/2017 4:15 PM Mirian Álvarez MD [facility]   5/16/2017 9:30 AM Mariam Khan MD [place] [address]   5/24/2017 2:00 PM [facility]

## 2017-04-27 NOTE — PROGRESS NOTES
61 y.o. WHITE OR  female who presents for RPE    She successfully underwent the breast surgery by Dr Ron Jack but the XRT hit her hard and she's still recovering her energy levels    To top it off, she fractured her left radius and this was initially not picked up on first xray. Subsequent eval by Dr Jluis Garcia revealed the fx and f/u after that revealed the scaphoid fx w associated liganment issues on mri. That really set her back a lot and she has not been exercising as a result. No cardiovascular complaints. She was aiming for 8-10k on the fitbit prior to all this     Denies polyuria, polydipsia, nocturia, vision change. Sugars have been running higher when she checks     Vitals 5/2/2017 4/17/2017 4/12/2017 4/4/2017 3/13/2017 3/13/2017   Weight 139 lb 139 lb 139 lb 136 lb       Vitals 2/27/2017   Weight 137 lb 9.6 oz     She's gotten 2 rounds of tx from pul, first one was augmentin and most recent was zpak and her sx are slightly better    Past Medical History:   Diagnosis Date    ADD (attention deficit disorder) 2014    [facility] psych    Amebic colitis 1970s    Arthritis     s/p cortisone left knee    Bronchiectasis     Dr. Martinez Maria; RLL    Bronchiectasis ([facility] [address]     FHx: breast cancer     Fibrocystic breast disease      [place] GERD (gastroesophageal reflux disease)     Dr. Agosto Persilke s/p dilation 2/12    H/O pulmonary function tests 2011    ratio 70, FEV1 81 no change postbd, TLC 93, RV 94, DLCO 81    History of right breast cancer 02/2016    Dr Ron Jack; right partial mastectomy, adjuvant XRT    Hyperlipidemia     Insomnia     Left rotator cuff tear 2000s    conservative therapy    Osteopenia     DEXA t-score -1.2 spine, -0.2 hip (4/08);  spine -1.2, hip 0.1 (10/10);  -1.8 spine, -0.3 FRAX 6.3/0.2 (7/13);  [facility] -1.7 spine, 0.1 hip (4/17)    Spontaneous pneumothorax 1970s    x3    Type II or unspecified type diabetes mellitus      on basis of elev a1c 9/13    Zoster right S1 3/12 Past Surgical History:   Procedure Laterality Date    BREAST SURGERY PROCEDURE UNLISTED  2/11    Left nipple duct exploration and excisional biopsy    CARDIAC SURG PROCEDURE UNLIST  4/14    Thallim negative, ef 75%    HX BREAST BIOPSY  4/12    Right Breast biopsy w/needle    HX COLONOSCOPY      Dr. Felicita Bell negative 2005; diverticulosis 12/13 Dr Anai Whelan    fibroids/ovarian cysts - Dr. Elfida Rubinstein ORTHOPAEDIC  03/2017    Dr Daniel Cha; left radial fx, left scaphoid fx after fall     Social History     Social History    Marital status:      Spouse name: N/A    Number of children: 2    Years of education: N/A     Occupational History   [employer]21 [employer]     Social History Main Topics    Smoking status: Former Smoker     Packs/day: 1.00     Years: 5.00     Quit date: 8/1/1973    Smokeless tobacco: Never Used    Alcohol use No    Drug use: No    Sexual activity: Not on file     Other Topics Concern    Not on file     Social History Narrative     Current Outpatient Prescriptions   Medication Sig    azithromycin (ZITHROMAX) 250 mg tablet take 2 tabs on ist day, then 1 tab daily x 4 day    temazepam (RESTORIL) 30 mg capsule TAKE 1 CAPSULE BY MOUTH AT BEDTIME AS NEEDED    fluticasone-salmeterol (ADVAIR DISKUS) 250-50 mcg/dose diskus inhaler Take 1 Puff by inhalation every twelve (12) hours.  albuterol (VENTOLIN HFA) 90 mcg/actuation inhaler Take 2 Puffs by inhalation every six (6) hours as needed for Wheezing. Indications: BRONCHOSPASTIC PULMONARY DISEASE    HYDROcodone-acetaminophen (NORCO) 5-325 mg per tablet Take 1-2 Tabs by mouth every four (4) hours as needed for Pain. Max Daily Amount: 12 Tabs.  metFORMIN ER (GLUCOPHAGE XR) 500 mg tablet Take 2 Tabs by mouth daily (with dinner).  pravastatin (PRAVACHOL) 40 mg tablet Take 1 Tab by mouth nightly.  DEXILANT 60 mg CpDB Take 60 mg by mouth daily.     b complex vitamins (B COMPLEX 1) tablet Take 1 Tab by mouth daily.  Cholecalciferol, Vitamin D3, (VITAMIN D3) 1,000 unit cap Take  by mouth.  IBUPROFEN (ADVIL PO) Take  by mouth as needed. No current facility-administered medications for this visit. Allergies   Allergen Reactions    Avelox [Moxifloxacin] Other (comments)     Felt jittery    Crestor [Rosuvastatin] Myalgia    Evista [Raloxifene] Other (comments)     Severe flashes    Lipitor [Atorvastatin] Other (comments)     Leg cramps and muscle aches    Macrobid [Nitrofurantoin Monohyd/M-Cryst] Hives    Neuromuscular Blockers, Steroidal Other (comments)     \"patient feels crazy\"     REVIEW OF SYSTEMS: gyn 2012, mammo 12/16, DEXA 4/17, colo 12/13 Dr Alejandra Mello, Dr Wheeler Reach  Ophtho  no vision change or eye pain  Oral  no mouth pain, tongue or tooth problems  Ears  no hearing loss, ear pain, fullness, no swallowing problems  Cardiac  no CP, PND, orthopnea, edema, palpitations or syncope  Chest  no breast masses  Resp  no wheezing, chronic coughing, dyspnea  GI  no heartburn, nausea, vomiting, change in bowel habits, bleeding, hemorrhoids  Urinary  no dysuria, hematuria, flank pain, urgency, frequency  Constitutional  no wt loss, night sweats, unexplained fevers  Neuro  no focal weakness, numbness, paresthesias, incoordination, ataxia, involuntary movements  Endo - no polyuria, polydipsia, nocturia, hot flashes    Visit Vitals    /64    Pulse 100    Temp 98 °F (36.7 °C) (Oral)    Ht 5' 3\" (1.6 m)    Wt 139 lb (63 kg)    SpO2 98%    BMI 24.62 kg/m2   A&O x3  Affect is appropriate. Mood stable  No apparent distress  Anicteric, no JVD, adenopathy or thyromegaly. No carotid bruits or radiated murmur  Lungs clear to auscultation, no wheezes or rales  Heart showed regular rate and rhythm. No murmur, rubs, gallops  Abdomen soft nontender, no hepatosplenomegaly or masses.    Ext without c/c/e, pulses 2+ symmetrically    LABS  From 9/10 showed   gluc 106, cr 0.70, gfr>60, alt 42,                   chol 238, tg 154, hdl 54, ldl-c 153, wbc 6.5, hb 13.1, plt 258, ua neg  From 2/11 showed   gluc 95,   cr 0.90                                                                                                   wbc 7.0, hb 14.1, plt 308  From 3/12 showed   gluc 103, cr 0.90              alt 43,                                                                           wbc 6.2, hb 13.7, plt 285  From 3/13 showed   gluc 130, cr 0.60,             alt 22,                    chol 244, tg 180, hdl 57, ldl-c 151,                                          ua neg, tsh 1.79  From 9/13 showed   gluc 119, cr 0.70, gfr>60,            hba1c 6.6, chol 228, tg 145, hdl 62, ldl-c 137, 2hr   From 2/14 showed   gluc 95,   cr 0.60, gfr>61, alt 20, hba1c 5.7,                    umar 2.8  From 4/14 showed   gluc 144, cr 0.98, gfr>60, alt 31,          wbc 7.6, hb 13.4, plt 317, ck/trop neg  From 10/14 showed gluc 98,   cr 0.60, gfr>60, alt 20,          chol 163, tg 96,   hdl 64, ldl-c 80  From 4/15 showed   gluc 96,   cr 0.70, gfr>60, alt 19, hba1c 6.1, chol 207, tg 126, hdl 68, ldl-c 114  From 10/15 showed        hba1c 6.3, chol 188, tg 118, hdl 66, ldl-c 98,   wbc 5.3, hb 13.3, plt 287, umar neg  From 4/16 showed   gluc 98,   cr 0.60, gfr>60, alt 19, hba1c 6.3, chol 170, tg 141, hdl 57, ldl-c 85  From 10/16 showed gluc 137, cr 0.50, gfr>60, alt 17, hba1c 6.2, chol 176, tg 302, hdl 54, ldl-c 62,   wbc 8.9, hb 12.3, plt 393, umar neg  From 1/17 showed   gluc 114, cr 0.60, gfr>60,           wbc 6.1, hb 12.9, plt 356  From 4/17 showed        hba1c 6.8, chol 223, tg 240, hdl 53, ldl-c 122    Patient Active Problem List   Diagnosis Code    Bronchiectasis Dr. Ramon Moore J47.9    Osteopenia  M85.80    Hyperlipidemia E78.5    Gastroesophageal reflux disease without esophagitis K21.9    Diabetes mellitus [facility] 2, [facility] E11.9    Malignant neoplasm of right female breast ([facility] C50.911 Assessment and plan:  1. Pulm. F/U Dr Andressa Smith and continue current  2. GERD. Continue ppi and avoidance measures  3. Osteopenia. Ca/d, wt bearing exercises  4. DM. Continue current regimen and she declined changing anything at this time as she really has not been compliant the last few months. lifestyle and dietary measures, wt loss. F/U Dr Xavier Councilman, podiatry as needed. 5. Lipids. She cut the dose in half for reasons unclear. Restart at old dose, diet and exercise as above  6  Breast ca. Per Dr Saida Bueno  7. Immunizations. Zosta and pvx declined at this time        RTC 9/17    Above conditions discussed at length and patient vocalized understanding.   All questions answered to patient satifaction

## 2017-04-28 ENCOUNTER — TELEPHONE (OUTPATIENT)
Dept: PULMONOLOGY | Age: 67
End: 2017-04-28

## 2017-04-28 RX ORDER — AZITHROMYCIN 250 MG/1
TABLET, FILM COATED ORAL
Qty: 6 TAB | Refills: 0 | Status: SHIPPED | OUTPATIENT
Start: 2017-04-28 | End: 2017-05-16 | Stop reason: SDUPTHER

## 2017-04-28 NOTE — TELEPHONE ENCOUNTER
Pt is calling to get a different abx. She was on augmentin but it is very rough on her stomach causing diarrhea, pain and nausea and is unable to finish. It. She is coughing up green \"sludge\", very congested, heavy cough causing chest pain and has a low grade fever of 99.4. She said that Dr. Shawna Bravo usually gives her an abx to keep on hand but she would like something different than the augmentin.  Pt uses [facility] on [address]. Please call [phone]

## 2017-05-01 ENCOUNTER — APPOINTMENT (OUTPATIENT)
Dept: PHYSICAL THERAPY | Age: 67
End: 2017-05-01
Payer: COMMERCIAL

## 2017-05-02 ENCOUNTER — OFFICE VISIT (OUTPATIENT)
Dept: INTERNAL MEDICINE CLINIC | Age: 67
End: 2017-05-02

## 2017-05-02 DIAGNOSIS — E78.5 HYPERLIPIDEMIA, UNSPECIFIED HYPERLIPIDEMIA TYPE: ICD-10-CM

## 2017-05-02 DIAGNOSIS — J47.1 BRONCHIECTASIS WITH ACUTE EXACERBATION (HCC): ICD-10-CM

## 2017-05-02 DIAGNOSIS — C50.111 MALIGNANT NEOPLASM OF CENTRAL PORTION OF RIGHT FEMALE BREAST (HCC): ICD-10-CM

## 2017-05-02 DIAGNOSIS — K21.9 GASTROESOPHAGEAL REFLUX DISEASE WITHOUT ESOPHAGITIS: ICD-10-CM

## 2017-05-02 DIAGNOSIS — M85.80 OSTEOPENIA, UNSPECIFIED LOCATION: ICD-10-CM

## 2017-05-02 NOTE — MR AVS SNAPSHOT
Visit Information Date & Time Provider Department Dept. Phone Encounter #  
 5/2/2017  1:00 PM Estuardo Rice MD Internist of [address] [place] [address] Place [id number] Your Appointments 5/10/2017  4:15 PM  
Follow Up with Peña Rhodes MD  
[address] [facility], [address] and [facility] - [facility] ([address]) Appt Note: 3wk fu  
 [address] [facility], [address] [facility]  
[phone] 23[facility], [address]  
  
    
 5/16/2017  9:30 AM  
Follow Up with Bernabe Phelps MD  
[address] ([address]) Appt Note: f/u from 1/21/2016; sick call w/ S. Nena Ovalle on 04/12/2017;  
 [address] [place] [address], [address] [address]  
[phone]  
  
   
 [address] [place] [address]. [phone]  
  
    
 8/31/2017  8:25 AM  
LAB with [facility] NURSE VISIT Internist of [facility] ([address]) Appt Note: lab; lab  
 [address] [facility], [address] [place] [address] [address]  
  
   
 [address], [address]  
  
    
 9/7/2017  2:45 PM  
Office Visit with Estuardo Rice MD  
Internist of [address] [place] [address] [address] [facility]) Appt Note: 4 mo fu  
 [address], [address] [place] [address] [address]  
  
   
 [address], [address] Upcoming Health Maintenance Date Due DTaP/Tdap/Td series (1 - Tdap) 9/28/1973 ZOSTER VACCINE AGE 60> 9/28/2012 Pneumococcal 19-64 Highest Risk (3 of 3 - PPSV23) 12/20/2016 FOOT EXAM Q1 4/22/2017 INFLUENZA AGE 9 TO ADULT 8/1/2017 MICROALBUMIN Q1 10/19/2017 HEMOGLOBIN A1C Q6M 10/25/2017 EYE EXAM RETINAL OR DILATED Q1 3/30/2018 LIPID PANEL Q1 4/25/2018 BREAST CANCER SCRN MAMMOGRAM 12/22/2018 COLONOSCOPY 4/22/2026 Allergies as of 5/2/2017  Review Complete On: 4/21/2017 By: Sophia Ray OTR/L Severity Noted Reaction Type Reactions Avelox [Moxifloxacin]  01/16/2012    Other (comments) Felt jittery Crestor [Rosuvastatin]  10/23/2014    Myalgia Evista [Raloxifene]    Other (comments) Severe flashes Lipitor [Atorvastatin]  11/07/2013   Side Effect Other (comments) Leg cramps and muscle aches Macrobid [Nitrofurantoin Monohyd/m-cryst]    [employer] Neuromuscular Blockers, Steroidal    Other (comments) \"patient feels crazy\" Current Immunizations  Reviewed on 10/25/2016 Name Date Influenza Vaccine 10/23/2014 Influenza Vaccine (Quad) PF 10/30/2015 Influenza Vaccine PF 10/2/2013 Influenza Vaccine Split 11/28/2012  9:38 AM, 1/16/2012  3:35 PM  
 Influenza Vaccine Whole 9/22/2010 Pneumococcal Conjugate (PCV-13) 10/25/2016 Pneumococcal Vaccine (Unspecified Type) 1/1/2008 TB Skin Test (PPD) 8/1/2009 Not reviewed this visit Vitals BP Pulse Temp Height(growth percentile) Weight(growth percentile) SpO2  
 124/64 (!) 118 98 °F (36.7 °C) (Oral) 5' 3\" (1.6 m) 139 lb (63 kg) 98% BMI OB Status Smoking Status 24.62 kg/m2 Postmenopausal Former Smoker Vitals History BMI and BSA Data Body Mass Index Body Surface Area  
 24.62 kg/m 2 1.67 m 2 Preferred Pharmacy Pharmacy Name Phone [id number] [address] [place] [phone] Your Updated Medication List  
  
   
This list is accurate as of: 5/2/17  1:54 PM.  Always use your most recent med list. ADVIL PO Take  by mouth as needed. albuterol 90 mcg/actuation inhaler Commonly known as:  VENTOLIN HFA Take 2 Puffs by inhalation every six (6) hours as needed for Wheezing. Indications: BRONCHOSPASTIC PULMONARY DISEASE  
  
 azithromycin 250 mg tablet Commonly known as:  ZITHROMAX  
take 2 tabs on ist day, then 1 tab daily x 4 day B COMPLEX 1 tablet Generic drug:  b complex vitamins Take 1 Tab by mouth daily. DEXILANT 60 mg Cpdb Generic drug:  Dexlansoprazole Take 60 mg by mouth daily. fluticasone-salmeterol 250-50 mcg/dose diskus inhaler Commonly known as:  ADVAIR DISKUS Take 1 Puff by inhalation every twelve (12) hours. HYDROcodone-acetaminophen 5-325 mg per tablet Commonly known as:  Georgina Safe Take 1-2 Tabs by mouth every four (4) hours as needed for Pain. Max Daily Amount: 12 Tabs. metFORMIN  mg tablet Commonly known as:  GLUCOPHAGE XR Take 2 Tabs by mouth daily (with dinner). pravastatin 40 mg tablet Commonly known as:  PRAVACHOL Take 1 Tab by mouth nightly. temazepam 30 mg capsule Commonly known as:  RESTORIL  
TAKE 1 CAPSULE BY MOUTH AT BEDTIME AS NEEDED  
  
 VITAMIN D3 1,000 unit Cap Generic drug:  cholecalciferol Take  by mouth. To-Do List   
 05/03/2017 3:30 PM  
  Appointment with Ramona Schumacher OTR/L at [address] [place] [address] ([phone]) 05/08/2017 3:30 PM  
  Appointment with Ramona Schumacher OTR/L at [place] [address] ([phone])  
  
 05/24/2017 2:00 PM  
  Appointment with [facility] RAD[facility]ATION [facility] at [facility] ([phone]) This appointment time is simply a place pelayo and may not reflect the true appointment time. If patient does not know the appointment time given to them at the time of scheduling, they should contact the Radiation Therapy department for detail. Introducing [facility] SERVICES! Dear Hernán: Thank you for requesting a [employer] account. Our records indicate that you already have an active [employer] account. You can access your account anytime at [email or web address] Did you know that you can access your hospital and ER discharge instructions at any time in [employer]? You can also review all of your test results from your hospital stay or ER visit. Additional Information If you have questions, please visit the Frequently Asked Questions section of the [employer] website at [email or web address] Remember, [employer] is NOT to be used for urgent needs. For medical emergencies, dial 911. Now available from your iPhone and Android! Please provide this summary of care documentation to your next provider. Your primary care clinician is listed as Dioni Dorman. If you have any questions after today's visit, please call [phone].

## 2017-05-02 NOTE — PROGRESS NOTES
1. Have you been to the ER, urgent care clinic or hospitalized since your last visit? NO.     2. Have you seen or consulted any other health care providers outside of the [address] [place] [address] since your last visit (Include any pap smears or colon screening)? NO      Do you have an Advanced Directive? NO    Would you like information on Advanced Directives?  YES

## 2017-05-03 ENCOUNTER — HOSPITAL ENCOUNTER (OUTPATIENT)
Dept: PHYSICAL THERAPY | Age: 67
Discharge: HOME | End: 2017-05-03
Payer: COMMERCIAL

## 2017-05-03 VITALS
HEIGHT: 63 IN | WEIGHT: 139 LBS | OXYGEN SATURATION: 98 % | SYSTOLIC BLOOD PRESSURE: 124 MMHG | TEMPERATURE: 98 F | DIASTOLIC BLOOD PRESSURE: 64 MMHG | BODY MASS INDEX: 24.63 KG/M2 | HEART RATE: 100 BPM

## 2017-05-03 PROCEDURE — 97530 THERAPEUTIC ACTIVITIES: CPT

## 2017-05-03 PROCEDURE — 97110 THERAPEUTIC EXERCISES: CPT

## 2017-05-03 NOTE — PROGRESS NOTES
OT DAILY TREATMENT NOTE  3-16    Patient Name: Miachel Kehr  Date:5/3/2017  : 1950  [x]  Patient  Verified  Payor: Patti Hamm / Plan: [place] [employer] / Product Type: HMO /    In time:335  Out time:**405*  Total Treatment Time (min): 30  Visit #: 4 of 9    Treatment Area: Nondisplaced fracture of middle third of navicular (scaphoid) bone of left wrist, subsequent encounter for fracture with delayed healing [S62.025G]    SUBJECTIVE  Pain Level (0-10 scale): 0/10 at rest, 4/10 with tasks such as pulling drawer  Any medication changes, allergies to medications, adverse drug reactions, diagnosis change, or new procedure performed?: [x] No    [] Yes (see summary sheet for update)  Subjective functional status/changes:   [] No changes reported  Able to pull pants up better    OBJECTIVE      20 min Therapeutic Exercise:  [x] See flow sheet :   Rationale: increase ROM and increase strength to improve the patients ability to grasp  Added soft putty pinch, roll, flatten, thumb ext flex      10 min Therapeutic Activity:  []  See flow sheet :   Rationale: increase ROM and improve coordination  to improve the patients ability to flex extend, manipulate   perfection palm to digit untimed  WristFlexion over wedge to retrieve 1 in pegs then sup to release into box x 16    With   [] TE   [] TA   [] neuro   [] other: Patient Education: [x] Review HEP    [] Progressed/Changed HEP based on:   [] positioning   [] body mechanics   [] transfers   [] heat/ice application   [] Splint wear/care   [] Sensory re-education   [] scar management      [] other:             Other Objective/Functional Measures:   Better supination with wheel and activities  Some discomfort with digit abd and pinch to 4,5 th digits     Pain Level (0-10 scale) post treatment: 0/10    ASSESSMENT/Changes in Function: Improving supination    Patient will continue to benefit from skilled OT services to modify and progress therapeutic interventions, address ROM deficits, address strength deficits, analyze and address soft tissue restrictions, analyze and cue movement patterns and instruct in home and community integration to attain remaining goals. []  See Plan of Care  []  See progress note/recertification  []  See Discharge Summary         Progress towards goals / Updated goals:  1. Patient will be independent in home exercise program for wrist forearm and thumb AROM. met 4/24/17  2. Patient will be able to supinate to gather coins in hand. met 4/24/16  3. Patient will increase wrist BIGGS in flexion-extension by at least  40 degreesprogressing 4/26/17    Long Term Goals: To be accomplished in 9 treatments:   1. Patient will achieve at least 60 degrees of supination for carrying items. met on gross eval 5/3/17  2. Patient will improve deviation of wrist by at least 20 degrees for combing hair and typing. 3.  Patient will report improved L hand function in daily tasks as shown by increae in FOTO of at least  20 points.         PLAN  []  Upgrade activities as tolerated     []  Continue plan of care  []  Update interventions per flow sheet       []  Discharge due to:_  []  Other:_      NONI Mares/L 5/3/2017  3:50 PM    Future Appointments  Date Time Provider Grant Rosa Isela   5/8/2017 3:30 PM NONI Mares/SEDRICK [facility]   5/10/2017 4:15 PM Brittanie Faulkner MD [facility] [address]   5/16/2017 9:30 AM Camilla Markham MD [address]   5/24/2017 2:00 PM [facility]ATION [facility]   8/31/2017 8:25 AM [facility] NURSE VISIT [facility] [address]   9/7/2017 2:45 PM Nabor Westbrook MD [facility]

## 2017-05-08 ENCOUNTER — HOSPITAL ENCOUNTER (OUTPATIENT)
Dept: PHYSICAL THERAPY | Age: 67
Discharge: HOME | End: 2017-05-08
Payer: COMMERCIAL

## 2017-05-08 PROCEDURE — 97110 THERAPEUTIC EXERCISES: CPT

## 2017-05-08 NOTE — PROGRESS NOTES
OT DAILY TREATMENT NOTE  3-16    Patient Name: Juventino Garcia  Date:2017  : 1950  [x]  Patient  Verified  Payor: Whitney Baig / Plan: [address] [place] [address] [employer] [place] HMO / Product Type: HMO /    In time:330  Out time:400  Total Treatment Time (min): 30  Visit #: 5 of 9    Treatment Area: Nondisplaced fracture of middle third of navicular (scaphoid) bone of left wrist, subsequent encounter for fracture with delayed healing [S62.025G]    SUBJECTIVE  Pain Level (0-10 scale): 0/10  Any medication changes, allergies to medications, adverse drug reactions, diagnosis change, or new procedure performed?: [x] No    [] Yes (see summary sheet for update)  Subjective functional status/changes:   [] No changes reported  I feel a little click when I turn my wrist over-now it has stopped    OBJECTIVE      30 min Therapeutic Exercise:  [] See flow sheet :   Rationale: increase ROM to improve the patients ability to grasp  Recheck for note  Soft putty and pegs, abd recip opp  Sup wheel x 10 with place and hold  Soft putty sup wrist ext  Fishing with no weight wrist flex       With   [] TE   [] TA   [] neuro   [] other: Patient Education: [x] Review HEP    [] Progressed/Changed HEP based on: progress made  [] positioning   [] body mechanics   [] transfers   [] heat/ice application   [] Splint wear/care   [] Sensory re-education   [] scar management      [] other:             Other Objective/Functional Measures:   Wrist flex 40 (22)  Ext  72 (55)  RD  18 (8)  UD  22 (10)  Sup  60 (40)  Opp  both (tips not base)  Thumb MP 60 (50)  Thumb IP 60 (45)  Radial Abd 50 (40)  Palmar Abd 52 (42)  9 hole  18 (22)  FOTO  49 (35)       Pain Level (0-10 scale) post treatment: 0/10    ASSESSMENT/Changes in Function: Improved FOTO, ROM, fine motor    Patient will continue to benefit from skilled OT services to modify and progress therapeutic interventions, address ROM deficits, address strength deficits, analyze and cue movement patterns and instruct in home and community integration to attain remaining goals. []  See Plan of Care  []  See progress note/recertification  []  See Discharge Summary         Progress towards goals / Updated goals:  1. Patient will be independent in home exercise program for wrist forearm and thumb AROM. met   2. Patient will be able to supinate to gather coins in hand. met   3. Patient will increase wrist BIGGS in flexion-extension by at least  40 degreesmet    Long Term Goals: To be accomplished in 9 treatments:   1. Patient will achieve at least 60 degrees of supination for carrying items. met   2. Patient will improve deviation of wrist by at least 20 degrees for combing hair and typing. met  3. Patient will report improved L hand function in daily tasks as shown by increae in FOTO of at least  20 points. progressing increased 14        PLAN  [x]  Upgrade activities as tolerated     [x]  Continue plan of care  []  Update interventions per flow sheet       []  Discharge due to:_  []  Other:_      Ortiz Moors, OTR/L 5/8/2017  3:26 PM    Future Appointments  Date Time Provider Grant Carrilloi   5/8/2017 3:30 PM JAMIE Sherman [facility]   5/10/2017 4:15 PM Eb Grijalva MD [facility] [address]   5/16/2017 9:30 AM Marni Irby MD [address]   5/24/2017 2:00 PM [facility]ATION [facility]   8/31/2017 8:25 AM [facility] NURSE VISIT [facility] SAMEER SCHED   9/7/2017 2:45 PM Michelle Lee MD [facility]

## 2017-05-08 NOTE — PROGRESS NOTES
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, admin in school  Visits from Start of Care: 5    Missed Visits: 1    Established Goals:         Excellent           Good         Limited           None  [x] Increased ROM   [x]  []  []  []  [] Increased Strength  []  []  []  []  [] Increased Mobility  []  []  []  []   [] Decreased Pain   []  []  []  []  [] Decreased Swelling  []  []  []  []  [x] Increased Fine Motor Skills [x]  []  []  []  [x] Increased ADL [place] [x]  []  []  []    Key Functional Changes:   Current measurements listed below with prior in ( )  Wrist flex 40 (22)  Ext  72 (55)  RD  18 (8)  UD  22 (10)  Sup  60 (40)  Opp  both (tips not base)  Thumb MP 60 (50)  Thumb IP 60 (45)  Radial Abd 50 (40)  Palmar Abd 52 (42)  9 hole  18 (22)    Prior goals and progress:  1. Patient will be independent in home exercise program for wrist forearm and thumb AROM. met   2. Patient will be able to supinate to gather coins in hand. met   3. Patient will increase wrist BIGGS in flexion-extension by at least  40 degreesmet    Long Term Goals: To be accomplished in 9 treatments:   1. Patient will achieve at least 60 degrees of supination for carrying items. met   2. Patient will improve deviation of wrist by at least 20 degrees for combing hair and typing. met  3.   Patient will report improved L hand function in daily tasks as shown by increae in FOTO of at least  20 points. progresing increased 14     Updated Goals: to be achieved in 4 weeks:  1. Patient will improve supination at least 15 degrees for lifting and carrying light items at work. 2.  Patient will improve l wrist flexion at least 10 degrees for easier self care. 3.  Patient will achieve at least 25# of  and 103 in one pinch for performing kitchen tasks. ASSESSMENT/RECOMMENDATIONS:  [x]Continue therapy per initial plan/protocol at a frequency of  2 x per week for 4 weeks  []Continue therapy with the following recommended changes:_____________________      _____________________________________________________________________  []Discontinue therapy progressing towards or have reached established goals  []Discontinue therapy due to lack of appreciable progress towards goals  []Discontinue therapy due to lack of attendance or compliance  []Await Physician's recommendations/decisions regarding therapy  []Other:________________________________________________________________    Thank you for this referral.   JAMIE Romero 5/8/2017 4:18 PM  NOTE TO PHYSICIAN:  PLEASE COMPLETE THE ORDERS BELOW AND   FAX TO [facility]: [phone]  If you are unable to process this request in 24 hours please contact our office: [phone] I have read the above report and request that my patient continue as recommended. ? I have read the above report and request that my patient continue therapy with the following changes/special instructions:________________________________________________________  ? I have read the above report and request that my patient be discharged from therapy.     Physicians signature: ________________________________Date: _____Time:_____

## 2017-05-10 ENCOUNTER — OFFICE VISIT (OUTPATIENT)
Dept: ORTHOPEDIC SURGERY | Age: 67
End: 2017-05-10

## 2017-05-10 VITALS
HEART RATE: 93 BPM | SYSTOLIC BLOOD PRESSURE: 120 MMHG | HEIGHT: 63 IN | DIASTOLIC BLOOD PRESSURE: 77 MMHG | WEIGHT: 137 LBS | BODY MASS INDEX: 24.27 KG/M2

## 2017-05-10 DIAGNOSIS — S62.025G CLOSED NONDISPLACED FRACTURE OF MIDDLE THIRD OF SCAPHOID OF LEFT WRIST WITH DELAYED HEALING, SUBSEQUENT ENCOUNTER: Primary | ICD-10-CM

## 2017-05-16 ENCOUNTER — OFFICE VISIT (OUTPATIENT)
Dept: PULMONOLOGY | Age: 67
End: 2017-05-16

## 2017-05-16 VITALS
WEIGHT: 134 LBS | BODY MASS INDEX: 23.74 KG/M2 | HEART RATE: 91 BPM | SYSTOLIC BLOOD PRESSURE: 98 MMHG | TEMPERATURE: 98.1 F | HEIGHT: 63 IN | RESPIRATION RATE: 14 BRPM | DIASTOLIC BLOOD PRESSURE: 72 MMHG | OXYGEN SATURATION: 99 %

## 2017-05-16 DIAGNOSIS — J47.9 BRONCHIECTASIS WITHOUT COMPLICATION (HCC): Primary | ICD-10-CM

## 2017-05-16 RX ORDER — AZITHROMYCIN 250 MG/1
250 TABLET, FILM COATED ORAL DAILY
Qty: 6 TAB | Refills: 0 | Status: SHIPPED | COMMUNITY
Start: 2017-05-16 | End: 2017-05-16 | Stop reason: CLARIF

## 2017-05-16 RX ORDER — AZITHROMYCIN 250 MG/1
TABLET, FILM COATED ORAL
Qty: 6 TAB | Refills: 0 | Status: SHIPPED | OUTPATIENT
Start: 2017-05-16 | End: 2017-09-07 | Stop reason: ALTCHOICE

## 2017-05-16 NOTE — PROGRESS NOTES
Chief Complaint   Patient presents with    Follow-up    Cough     Med rec done, med list accurate according to patient.  Patient requesting to speak with Dr. Adamaris Lenz about 1st antibiotic Augmentin and also about her radiation Tx.     Bernabe Ledesma LPN

## 2017-05-16 NOTE — PROGRESS NOTES
[facility]  Pulmonary, Critical Care, and Sleep Medicine      Pulmonary Office Progress Notes    Name: Miguelina Gonzales     : 1950     Date: 2017        Subjective:     Patient is a 61 y.o. female  presents for f/u for bronchiectasis with recent exacerbation. Patient got sick with Cough- productive with creamy green mucus. in March- April needing antibiotics with improvement now. SOB with activity has improved  She reports using Advair only on seasonal change and Albuterol rarely. She report no fever, no chills, nausea and vomiting. She denies hemoptysis   She denies pedal edema. Medication compliance-good     She report tripped and fell few wk ago, left UE on cast-follow by ortho. ( Dr Nazanin Villela). She has just completed radiation for breast Ca, s/p lumpectomy and now starting hormonal therapy. Past Medical History:   Diagnosis Date    ADD (attention deficit disorder)     [facility] psych    Amebic colitis 1970s    Arthritis     s/p cortisone left knee    Bronchiectasis     Dr. Shawna Bravo; RLL    Bronchiectasis [facility])     FHx: breast cancer     Fibrocystic breast disease     Dr. Jeromy Minor GERD (gastroesophageal reflux disease)     Dr. Dulce Silva s/p dilation /    H/O pulmonary function tests     ratio 70, FEV1 81 no change postbd, TLC 93, RV 94, DLCO 81    History of right breast cancer 2016    Dr Lady Scruggs; right partial mastectomy, adjuvant XRT    Hyperlipidemia     Insomnia     Left rotator cuff tear     conservative therapy    Osteopenia     DEXA t-score -1.2 spine, -0.2 hip ();  spine -1.2, hip 0.1 (10/10);  -1.8 spine, -0.3 FRAX 6.3/0.2 ();  [facility] -1.7 spine, 0.1 hip ()    Spontaneous pneumothorax 1970s    x3    Type II or unspecified type diabetes mellitus      on basis of elev a1c     Zoster right S1 3/12       Allergies   Allergen Reactions    Avelox [Moxifloxacin] Other (comments)     Felt jittery    Crestor [Rosuvastatin] Myalgia    Evista [Raloxifene] Other (comments)     Severe flashes    Lipitor [Atorvastatin] Other (comments)     Leg cramps and muscle aches    Macrobid [Nitrofurantoin Monohyd/M-Cryst] Hives    Neuromuscular Blockers, Steroidal Other (comments)     \"patient feels crazy\"       Current Outpatient Prescriptions   Medication Sig Dispense Refill    azithromycin (ZITHROMAX) 250 mg tablet take 2 tabs on ist day, then 1 tab daily x 4 day 6 Tab 0    temazepam (RESTORIL) 30 mg capsule TAKE 1 CAPSULE BY MOUTH AT BEDTIME AS NEEDED 30 Cap 1    fluticasone-salmeterol (ADVAIR DISKUS) 250-50 mcg/dose diskus inhaler Take 1 Puff by inhalation every twelve (12) hours. 1 Inhaler 3    albuterol (VENTOLIN HFA) 90 mcg/actuation inhaler Take 2 Puffs by inhalation every six (6) hours as needed for Wheezing. Indications: BRONCHOSPASTIC PULMONARY DISEASE 1 Inhaler 3    HYDROcodone-acetaminophen (NORCO) 5-325 mg per tablet Take 1-2 Tabs by mouth every four (4) hours as needed for Pain. Max Daily Amount: 12 Tabs. 40 Tab 0    metFORMIN ER (GLUCOPHAGE XR) 500 mg tablet Take 2 Tabs by mouth daily (with dinner). 180 Tab 3    pravastatin (PRAVACHOL) 40 mg tablet Take 1 Tab by mouth nightly. 90 Tab 3    DEXILANT 60 mg CpDB Take 60 mg by mouth daily. 2    b complex vitamins (B COMPLEX 1) tablet Take 1 Tab by mouth daily.  Cholecalciferol, Vitamin D3, (VITAMIN D3) 1,000 unit cap Take  by mouth.  IBUPROFEN (ADVIL PO) Take  by mouth as needed.            Review of Systems:  HEENT: No epistaxis, no nasal drainage, no difficulty in swallowing, no redness in eyes  Respiratory: as above  Cardiovascular: no chest pain, no palpitations, no chronic leg edema, no syncope  Gastrointestinal: no abd pain, no vomiting, no diarrhea, no bleeding symptoms  Genitourinary: No urinary symptoms or hematuria  Integument/breast: No ulcers or rashes  Musculoskeletal:Neg  Neurological: No focal weakness, no seizures, no headaches  Behvioral/Psych: No anxiety, no depression  Constitutional: No fever, no chills, no weight loss, no night sweats     Objective: There were no vitals taken for this visit. Physical Exam:   General: comfortable, no acute distress  HEENT: pupils reactive, sclera anicteric, EOM intact  Neck: No adenopathy or thyroid swelling, no lymphadenopathy or JVD, supple  CVS: S1S2 no murmurs  RS: Mod AE bilaterally, few right basal crackles. no tactile fremitus or egophony, no accessory muscle use  Abd: soft, non tender, no hepatosplenomegaly  Neuro: non focal, awake, alert  Extrm: no leg edema, clubbing or cyanosis  Skin: no rash    Data review:     Abstract on 05/15/2017   Component Date Value Ref Range Status    LDL-C, External 04/25/2017 122   Final    Hemoglobin A1c, External 04/25/2017 6.8   Final   Orders Only on 04/26/2017   Component Date Value Ref Range Status    Hemoglobin A1c 04/25/2017 6.8* 4.8 - 5.9 % Final    AVG GLU 04/25/2017 149* 91 - 123 mg/dL Final   Orders Only on 04/24/2017   Component Date Value Ref Range Status    Triglyceride 04/25/2017 240* 40 - 149 mg/dL Final    HDL Cholesterol 04/25/2017 53  40 - 59 mg/dL Final    Cholesterol, total 04/25/2017 223* 110 - 200 mg/dL Final    LDL, calculated 04/25/2017 122* 50 - 99 mg/dL Final    VLDL, calculated 04/25/2017 48* 8 - 30 mg/dL Final    Comment: Test includes cholesterol, HDL cholesterol, triglycerides and LDL.   Cholesterol Recommended NCEP guidelines in mg/dL:  Less than 200      Desirable  200 - 239          Borderline High  Greater than or  = to [address] Outpatient Visit on 12/20/2016   Component Date Value Ref Range Status    Creatinine (POC) 12/20/2016 0.7  0.6 - 1.3 MG/DL Final    GFR-AA (POC) 12/20/2016 >60  >60 ml/min/1.73m2 Final    GFR, non-AA (POC) 12/20/2016 >60  >60 ml/min/1.73m2 Final    Comment: Estimated GFR is calculated using the IDMS-traceable Modification of Diet in Renal Disease (MDRD) Study equation, reported for both  Americans (GFRAA) and non- Americans (GFRNA), and normalized to 1.73m2 body surface area. The physician must decide which value applies to the patient. The MDRD study equation should only be used in individuals age 25 or older. It has not been validated for the following: pregnant women, patients with serious comorbid conditions, or on certain medications, or persons with extremes of body size, muscle mass, or nutritional status. Imaging:  I have personally reviewed the patients radiographs and have reviewed the reports:  CXR 1/8/15: Left linguilar infiltrate. IMPRESSION:   · Bronchiectasis- predominant RLL with expected exacerbations- concerned about recent exacerbation needing antibiotics . ? Decline in immunity related to breast Ca and treatment. Clinically resolved. · GERD  · DM  · Osteopenia. RECOMMENDATIONS:   · Continue bronchial hygiene measures  · Continue Advair for maintenance and albuterol for rescue  · Will prescribe antibiotics when needed- Zithromax  · Advised to seek early intervention   · GERD therapy step up  · Discussed and answered questions and concerns. · Will check CXR and PFT's - next visit as clinically indicated- instructed to let me know if change in symptoms  · Follow up in 4 months.         Fiona Enriquez MD

## 2017-05-16 NOTE — MR AVS SNAPSHOT
Visit Information Date & Time Provider Department Dept. Phone Encounter #  
 5/16/2017  9:30 AM Malena Duarte MD [facility] [id number] Follow-up Instructions Return in about 4 months (around 9/16/2017). Your Appointments 5/31/2017  4:20 PM  
Follow Up with Don Yoo MD  
[address] [facility], [address] and Spine Specialists - hospitals ([facility] Appt Note: 3wk fu  
 [address], [address] [facility]  
[phone] [address] [facility], [address]  
  
    
 8/31/2017  8:25 AM  
LAB with [facility] NURSE VISIT Internist of [facility] ([facility]) Appt Note: lab; lab  
 [address] [facility], [address] [place] [address] [facility] [address] [place]  
  
   
 [address], [address]  
  
    
 9/7/2017  2:45 PM  
Office Visit with Margie Fields MD  
Internist of [address] [place] [address]) Appt Note: 4 mo fu  
 [address], [address] [place] [address] [address] [place]  
  
   
 [address], [address] Upcoming Health Maintenance Date Due Pneumococcal 19-64 Highest Risk (3 of 3 - PPSV23) 12/20/2016 FOOT EXAM Q1 4/22/2017 INFLUENZA AGE 9 TO ADULT 8/1/2017 MICROALBUMIN Q1 10/19/2017 HEMOGLOBIN A1C Q6M 10/25/2017 EYE EXAM RETINAL OR DILATED Q1 3/30/2018 LIPID PANEL Q1 4/25/2018 BREAST CANCER SCRN MAMMOGRAM 12/22/2018 COLONOSCOPY 4/22/2026 DTaP/Tdap/Td series (2 - Td) 5/3/2027 Allergies as of 5/16/2017  Review Complete On: 5/16/2017 By: Malena Duarte MD  
  
 Severity Noted Reaction Type Reactions Avelox [Moxifloxacin]  01/16/2012    Other (comments) Felt jittery Crestor [Rosuvastatin]  10/23/2014    Myalgia Evista [Raloxifene]    Other (comments) Severe flashes Lipitor [Atorvastatin]  11/07/2013   Side Effect Other (comments) Leg cramps and muscle aches Macrobid [Nitrofurantoin Monohyd/m-cryst]    Karson Montoya Neuromuscular Blockers, Steroidal    Other (comments) \"patient feels crazy\" Current Immunizations  Reviewed on 5/16/2017 Name Date Influenza Vaccine 10/23/2014 Influenza Vaccine (Quad) PF 10/30/2015 Influenza Vaccine PF 10/2/2013 Influenza Vaccine Split 11/28/2012  9:38 AM, 1/16/2012  3:35 PM  
 Influenza Vaccine Whole 9/22/2010 Pneumococcal Conjugate (PCV-13) 10/25/2016 Pneumococcal Vaccine (Unspecified Type) 1/1/2008 TB Skin Test (PPD) 8/1/2009 Reviewed by Miguelina Spaulding MD on 5/16/2017 at 10:07 AM  
You Were Diagnosed With   
  
 Codes Comments Bron[facility]ctasis without complication ([facility]    -  Primary ICD-10-CM: J47.9 ICD-9-CM: 494.0 Vitals BP Pulse Temp Resp Height(growth percentile) Weight(growth percentile) 98/72 (BP 1 Location: Left arm, BP Patient Position: Sitting) 91 98.1 °F (36.7 °C) (Oral) 14 5' 3\" (1.6 m) 134 lb (60.8 kg) SpO2 BMI OB Status Smoking Status 99% 23.74 kg/m2 Postmenopausal Former Smoker BMI and BSA Data Body Mass Index Body Surface Area  
 23.74 kg/m 2 1.64 m 2 Preferred Pharmacy Pharmacy Name Phone [id number] [address], [facility] [facility],[address] [facility] [phone] Your Updated Medication List  
  
   
This list is accurate as of: 5/16/17 10:13 AM.  Always use your most recent med list. ADVIL PO Take  by mouth as needed. albuterol 90 mcg/actuation inhaler Commonly known as:  VENTOLIN HFA Take 2 Puffs by inhalation every six (6) hours as needed for Wheezing. Indications: BRONCHOSPASTIC PULMONARY DISEASE  
  
 azithromycin 250 mg tablet Commonly known as:  ZITHROMAX  
take 2 tabs on ist day, then 1 tab daily x 4 day B COMPLEX 1 tablet Generic drug:  b complex vitamins Take 1 Tab by mouth daily. DEXILANT 60 mg Cpdb Generic drug:  Dexlansoprazole Take 60 mg by mouth daily. fluticasone-salmeterol 250-50 mcg/dose diskus inhaler Commonly known as:  ADVAIR DISKUS Take 1 Puff by inhalation every twelve (12) hours. HYDROcodone-acetaminophen 5-325 mg per tablet Commonly known as:  Hema Tereso Take 1-2 Tabs by mouth every four (4) hours as needed for Pain. Max Daily Amount: 12 Tabs. metFORMIN  mg tablet Commonly known as:  GLUCOPHAGE XR Take 2 Tabs by mouth daily (with dinner). pravastatin 40 mg tablet Commonly known as:  PRAVACHOL Take 1 Tab by mouth nightly. temazepam 30 mg capsule Commonly known as:  RESTORIL  
TAKE 1 CAPSULE BY MOUTH AT BEDTIME AS NEEDED  
  
 VITAMIN D3 1,000 unit Cap Generic drug:  cholecalciferol Take  by mouth. Prescriptions Sent to Pharmacy Refills  
 azithromycin (ZITHROMAX) 250 mg tablet 0 Sig: take 2 tabs on ist day, then 1 tab daily x 4 day Class: Normal  
 Pharmacy: [facility]/pharmacy [address] [place] [address],[address] #: [phone] Follow-up Instructions Return in about 4 months (around 9/16/2017). To-Do List   
 05/17/2017 Procedures: AMB POC PFT COMPLETE W/BRONCHODILATOR   
  
 05/17/2017 Procedures: AMB POC PFT COMPLETE W/O BRONCHODILATOR   
  
 05/17/2017 Procedures:  DIFFUSING CAPACITY   
  
 05/17/2017 Procedures:  GAS DILUT/WASHOUT LUNG VOL W/WO DISTRIB VENT&VOL   
  
 05/24/2017 2:00 PM  
  Appointment with [facility] RADIATION ONCOLOGY at [facility] RADIATION THERAPY ([phone]) This appointment time is simply a place pelayo and may not reflect the true appointment time. If patient does not know the appointment time given to them at the time of scheduling, they should contact the Radiation Therapy department for detail. Introducing [facility]! Dear Dona Villela: Thank you for requesting a [employer] account. Our records indicate that you already have an active [employer] account.   You can access your account anytime at [email or web address] Did you know that you can access your hospital and ER discharge instructions at any time in [employer]? You can also review all of your test results from your hospital stay or ER visit. Additional Information If you have questions, please visit the Frequently Asked Questions section of the [employer] website at [email or web address] Remember, [employer] is NOT to be used for urgent needs. For medical emergencies, dial 911. Now available from your iPhone and Android! Please provide this summary of care documentation to your next provider. Your primary care clinician is listed as Bearl Jemal. If you have any questions after today's visit, please call [phone].

## 2017-05-24 ENCOUNTER — HOSPITAL ENCOUNTER (OUTPATIENT)
Dept: RADIATION THERAPY | Age: 67
Discharge: HOME | End: 2017-05-24
Payer: COMMERCIAL

## 2017-05-24 PROCEDURE — 99211 OFF/OP EST MAY X REQ PHY/QHP: CPT

## 2017-05-30 ENCOUNTER — HOSPITAL ENCOUNTER (OUTPATIENT)
Dept: PHYSICAL THERAPY | Age: 67
End: 2017-05-30
Payer: COMMERCIAL

## 2017-05-31 ENCOUNTER — OFFICE VISIT (OUTPATIENT)
Dept: ORTHOPEDIC SURGERY | Age: 67
End: 2017-05-31

## 2017-05-31 VITALS
HEIGHT: 63 IN | SYSTOLIC BLOOD PRESSURE: 146 MMHG | HEART RATE: 88 BPM | BODY MASS INDEX: 23.92 KG/M2 | TEMPERATURE: 97.6 F | DIASTOLIC BLOOD PRESSURE: 65 MMHG | WEIGHT: 135 LBS

## 2017-05-31 DIAGNOSIS — S62.025G CLOSED NONDISPLACED FRACTURE OF MIDDLE THIRD OF SCAPHOID OF LEFT WRIST WITH DELAYED HEALING, SUBSEQUENT ENCOUNTER: Primary | ICD-10-CM

## 2017-05-31 DIAGNOSIS — M25.532 LEFT WRIST PAIN: ICD-10-CM

## 2017-05-31 NOTE — PROGRESS NOTES
Rola Chung  1950   Chief Complaint   Patient presents with    Hand Pain     Left        HISTORY OF PRESENT ILLNESS  Rola Chung is a 61 y.o. female who presents today for reevaluation of left wrist pain. Today she presents in a cast. States her pain is a 0/10. At her last office visit, she was instructed to continue to attend OT and wear her brace for 2-3 weeks. Her date of injury was around 3/21/17. She notes still having pain with certain activities like pulling a dresser drawer open. She presents to the office wearing her removable splint today. She has been attending OT with some improvement in her stiffness and mobility. She has completed an MRI of the wrist. Patient denies any fever, chills, chest pain, shortness of breath or calf pain. There are no new illness or injuries to report since last seen in the office. There are no changes to medications, allergies, family or social history. PHYSICAL EXAM:   Visit Vitals    /65    Pulse 88    Temp 97.6 °F (36.4 °C) (Oral)    Ht 5' 3\" (1.6 m)    Wt 135 lb (61.2 kg)    BMI 23.91 kg/m2     The patient is a well-developed, well-nourished female   in no acute distress. The patient is alert and oriented times three. The patient is alert and oriented times three. Mood and affect are normal.  LYMPHATIC: lymph nodes are not enlarged and are within normal limits  SKIN: normal in color and non tender to palpation. There are no bruises or abrasions noted. NEUROLOGICAL: Motor sensory exam is within normal limits. Reflexes are equal bilaterally.  There is normal sensation to pinprick and light touch  MUSCULOSKELETAL:  Examination Left Hand   Skin Intact   Deformity -   Swelling -   Tenderness -   Tenderness A1 Pulley -   Finger flexion Full   Finger extension Full   Thenar Eminence Atrophy -   Sensation Normal   Capillary refill -   Heberden's nodes -   Dupuytren's -     Examination Left Wrist   Skin Intact   Tenderness No ttp Flexion 30   Extension 30   Deformity -   Effusion -   Tinnel's sign -   Phalen's test -   Finklestein maneuver -   Pain with thumb abduction -     IMAGING: Previous 3 view xray of left wrist reveal small increase of calcification along scaphoid      IMAGING:   XR of the right hand dated 5/31/17 was reviewed and read: Fracture site in good position. Moderate degenerative changes in the Aia 16 joint. MRI of the left wrist dated 3/25/17 was reviewed and read:   IMPRESSION:  1. Partially healing scaphoid waist fracture. 2. 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 3. Mild ECU tendinosis. 4. Mild extensor digitorum tenosynovitis distal to the radius. 5. Mild distal radioulnar joint effusion with likely small loose body. Mild  radiocarpal joint effusion. IMPRESSION:      ICD-10-CM ICD-9-CM    1. Closed nondisplaced fracture of middle third of scaphoid of left wrist with delayed healing, subsequent encounter S62.025G V54.19    2. Left wrist pain M25.532 719.43 AMB POC XRAY, WRIST; COMPLETE, 3+ VIE        PLAN:  Patient has made significant improvement in her pain through OT. She will continue to wear the brace as needed. Continue activities as tolerated. I will see her back in one months as tolerated. .      Scribed by Alexandro Kayser ([facility]) as dictated by Brennon Santos MD    I, Dr. Brennon Santos, confirm that all documentation is accurate.     Brennon Santos M.D.   [facility] and [facility] Specialist

## 2017-06-02 ENCOUNTER — APPOINTMENT (OUTPATIENT)
Dept: PHYSICAL THERAPY | Age: 67
End: 2017-06-02

## 2017-06-26 RX ORDER — TEMAZEPAM 30 MG/1
CAPSULE ORAL
Qty: 30 CAP | Refills: 1 | Status: SHIPPED | OUTPATIENT
Start: 2017-06-26 | End: 2017-08-26 | Stop reason: SDUPTHER

## 2017-06-27 ENCOUNTER — TELEPHONE (OUTPATIENT)
Dept: INTERNAL MEDICINE CLINIC | Age: 67
End: 2017-06-27

## 2017-06-27 NOTE — TELEPHONE ENCOUNTER
Pt is asking to speak with a nurse about the rx Metformin 1000 mg. She didn't want to give me details as she said it was embarrassing.     Pt # [id number]

## 2017-06-28 ENCOUNTER — OFFICE VISIT (OUTPATIENT)
Dept: ORTHOPEDIC SURGERY | Age: 67
End: 2017-06-28

## 2017-06-28 VITALS — HEART RATE: 93 BPM | DIASTOLIC BLOOD PRESSURE: 85 MMHG | SYSTOLIC BLOOD PRESSURE: 157 MMHG | TEMPERATURE: 97.9 F

## 2017-06-28 DIAGNOSIS — S62.025G CLOSED NONDISPLACED FRACTURE OF MIDDLE THIRD OF SCAPHOID OF LEFT WRIST WITH DELAYED HEALING, SUBSEQUENT ENCOUNTER: Primary | ICD-10-CM

## 2017-06-28 RX ORDER — TAMOXIFEN CITRATE 10 MG/1
TABLET, FILM COATED ORAL DAILY
COMMUNITY
End: 2022-02-14 | Stop reason: SDUPTHER

## 2017-06-28 NOTE — PROGRESS NOTES
Astrid Alcantara  1950   Chief Complaint   Patient presents with    Hand Pain     left        HISTORY OF PRESENT ILLNESS  Astrid Alcantara is a 61 y.o. female who presents today for reevaluation of left wrist. Her date of injury was around 3/21/17. She notes today that she still has problems with lifting and pulling. Notes some aching in her wrist. She has not been back in OT since she last saw us. Patient denies any fever, chills, chest pain, shortness of breath or calf pain. There are no new illness or injuries to report since last seen in the office. No changes in medications, allergies, social or family history. PHYSICAL EXAM:   Visit Vitals    /85    Pulse 93    Temp 97.9 °F (36.6 °C)     The patient is a well-developed, well-nourished female   in no acute distress. The patient is alert and oriented times three. The patient is alert and oriented times three. Mood and affect are normal.  LYMPHATIC: lymph nodes are not enlarged and are within normal limits  SKIN: normal in color and non tender to palpation. There are no bruises or abrasions noted. NEUROLOGICAL: Motor sensory exam is within normal limits. Reflexes are equal bilaterally. There is normal sensation to pinprick and light touch  MUSCULOSKELETAL:  Examination Left Hand   Skin Intact   Deformity -   Swelling -   Tenderness + mild over snuff box   Tenderness A1 Pulley -   Finger flexion Full   Finger extension Full   Thenar Eminence Atrophy -   Sensation Normal   Capillary refill -   Heberden's nodes -   Dupuytren's -     Examination Left Wrist   Skin Intact   Tenderness -   Flexion 60   Extension 60   Deformity -   Effusion -   Tinnel's sign -   Phalen's test -   Finklestein maneuver -   Pain with thumb abduction -            IMPRESSION:      ICD-10-CM ICD-9-CM    1. Closed nondisplaced fracture of middle third of scaphoid of left wrist with delayed healing, subsequent encounter S62.025G V54.19         PLAN:   1. Patient has improved significantly in regards to her motion, swelling and pain  2. Reinforced that this is a slow bone to heal and it will take a while for her strength and stamina to return. 3. Will wear brace only as needed   4.  Gradually increase her activities as pain permits  RTC PRN  Follow-up Disposition: Not on [address] [facility], L[address] and Spine Specialist

## 2017-07-03 NOTE — PROGRESS NOTES
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 She has HEP. Her FOTO improved from 31-49    Current measurements listed below with prior in ( )  Wrist flex 40 (22)  Ext  72 (55)  RD  18 (8)  UD  22 (10)  Sup  60 (40)  Opp  both (tips not base)  Thumb MP 60 (50)  Thumb IP 60 (45)  Radial Abd 50 (40)  Palmar Abd 52 (42)  9 hole  18 (22)    Prior goals and progress:  1. Patient will be independent in home exercise program for wrist forearm and thumb AROM. met   2. Patient will be able to supinate to gather coins in hand. met   3. Patient will increase wrist BIGGS in flexion-extension by at least  40 degreesmet    Long Term Goals: To be accomplished in 9 treatments:   1. Patient will achieve at least 60 degrees of supination for carrying items. met   2. Patient will improve deviation of wrist by at least 20 degrees for combing hair and typing. met  3. Patient will report improved L hand function in daily tasks as shown by increae in FOTO of at least  20 points. progresing increased 14    ASSESSMENT/Changes in Function: Patient made excellent progress in ROM and hand use.   Further OT was recommended at time of this recheck, but patient did not return. ASSESSMENT/RECOMMENDATIONS:  [x]Discontinue therapy: [x]Patient has reached or is progressing toward set goals      []Patient is non-compliant or has abdicated      []Due to lack of appreciable progress towards set goals    NONI Altamirano/L 7/3/2017 12:42 PM    NOTE TO PHYSICIAN:  Please complete the following and fax to: [facility] at [facility] at [phone]  . Retain this original for your records. If you are unable to process this request in   24 hours, please contact our office.      [] I have read the above report and request that my patient continue therapy with the following changes/special instructions:  [] I have read the above report and request that my patient be discharged from therapy    Physician's Signature:_____________________ Date:___________Time:__________

## 2017-07-31 RX ORDER — FLUTICASONE PROPIONATE AND SALMETEROL 50; 250 UG/1; UG/1
POWDER RESPIRATORY (INHALATION)
Qty: 3 INHALER | Refills: 3 | Status: SHIPPED | OUTPATIENT
Start: 2017-07-31 | End: 2018-01-09 | Stop reason: SDUPTHER

## 2017-08-27 RX ORDER — TEMAZEPAM 30 MG/1
CAPSULE ORAL
Qty: 30 CAP | Refills: 1 | Status: SHIPPED | OUTPATIENT
Start: 2017-08-27 | End: 2017-08-28 | Stop reason: SDUPTHER

## 2017-08-28 RX ORDER — TEMAZEPAM 30 MG/1
CAPSULE ORAL
Qty: 30 CAP | Refills: 1 | Status: SHIPPED | OUTPATIENT
Start: 2017-08-28 | End: 2017-10-25 | Stop reason: SDUPTHER

## 2017-09-04 NOTE — PROGRESS NOTES
61 y.o. WHITE OR  female who presents for RPE    She seems to be doing well from the right breast ca, now on tamoxifen being followed by Dr Poonam Jonas    No cardiovascular complaints. She is back to walking getting up to 10K steps daily     Denies polyuria, polydipsia, nocturia, vision change. She reports that the stools have been loose despite being on met ER and occasionally she sees partially digested pills passing in her stool    Vitals 9/7/2017 6/28/2017 5/31/2017 5/16/2017 5/10/2017   Weight 133 lb 3.2 oz  135 lb 134 lb 137 lb     Past Medical History:   Diagnosis Date    ADD (attention deficit disorder) 2014    [facility] psych    Amebic colitis 1970s    Arthritis     s/p cortisone left knee    Bronchiectasis     Dr. Chino Healy; RLL    Bronchiectasis ([facility] [address]     FHx: breast cancer     Fibrocystic breast disease     Dr. Mike Cheng GERD (gastroesophageal reflux disease)     Dr. Lorena Rebollar s/p dilation 2/12    H/O pulmonary function tests 2011    ratio 70, FEV1 81 no change postbd, TLC 93, RV 94, DLCO 81    History of right breast cancer 02/2016    Dr Poonam Jonas; right partial mastectomy, adjuvant XRT    Hyperlipidemia     Insomnia     Left rotator cuff tear 2000s    conservative therapy    Osteopenia     DEXA t-score -1.2 spine, -0.2 hip (4/08);  spine -1.2, hip 0.1 (10/10);  -1.8 spine, -0.3 FRAX 6.3/0.2 (7/13);  [facility] -1.7 spine, 0.1 hip (4/17)    Spontaneous pneumothorax 1970s    x3    Type II or unspecified type diabetes mellitus      on basis of elev a1c 9/13    Zoster right S1 3/12     Past Surgical History:   Procedure Laterality Date    BREAST SURGERY PROCEDURE UNLISTED  2/11    Left nipple duct exploration and excisional biopsy    CARDIAC SURG PROCEDURE UNLIST  4/14    Thallim negative, ef 75%    HX BREAST BIOPSY  4/12    Right Breast biopsy w/needle    HX BREAST LUMPECTOMY Right 02/14/2017    HX COLONOSCOPY      Dr. Lorena Rebollar negative 2005; diverticulosis 12/13 Dr Carlos Gayle 1993    fibroids/ovarian cysts - Dr. Tricia Anderson HX ORTHOPAEDIC  03/2017    Dr Deshawn Wharton; left radial fx, left scaphoid fx after fall     Social History     Social History    Marital status:      Spouse name: N/A    Number of children: 2    Years of education: N/A     Occupational History   2021 [employer]     Social History Main Topics    Smoking status: Former Smoker     Packs/day: 1.00     Years: 5.00     Quit date: 8/1/1973    Smokeless tobacco: Never Used    Alcohol use No    Drug use: No    Sexual activity: Not on file     Other Topics Concern    Not on file     Social History Narrative     Current Outpatient Prescriptions   Medication Sig    pioglitazone (ACTOS) 15 mg tablet One daily    temazepam (RESTORIL) 30 mg capsule TAKE ONE CAPSULE BY MOUTH EVERY NIGHT AT BEDTIME AS NEEDED    ADVAIR DISKUS 250-50 mcg/dose diskus inhaler TAKE 1 PUFF BY INHALATION TWO (2) TIMES A DAY.  tamoxifen (NOLVADEX) 10 mg tablet Take  by mouth daily.  albuterol (VENTOLIN HFA) 90 mcg/actuation inhaler Take 2 Puffs by inhalation every six (6) hours as needed for Wheezing. Indications: BRONCHOSPASTIC PULMONARY DISEASE    metFORMIN ER (GLUCOPHAGE XR) 500 mg tablet Take 2 Tabs by mouth daily (with dinner). (Patient taking differently: Take 500 mg by mouth daily (with dinner). )    pravastatin (PRAVACHOL) 40 mg tablet Take 1 Tab by mouth nightly.  DEXILANT 60 mg CpDB Take 60 mg by mouth daily.  b complex vitamins (B COMPLEX 1) tablet Take 1 Tab by mouth daily.  Cholecalciferol, Vitamin D3, (VITAMIN D3) 1,000 unit cap Take  by mouth.  IBUPROFEN (ADVIL PO) Take  by mouth as needed.  HYDROcodone-acetaminophen (NORCO) 5-325 mg per tablet Take 1-2 Tabs by mouth every four (4) hours as needed for Pain. Max Daily Amount: 12 Tabs. No current facility-administered medications for this visit.       Allergies   Allergen Reactions    Avelox [Moxifloxacin] Other (comments)     Felt jittery    Crestor [Rosuvastatin] Myalgia    Evista [Raloxifene] Other (comments)     Severe flashes    Lipitor [Atorvastatin] Other (comments)     Leg cramps and muscle aches    Macrobid [Nitrofurantoin Monohyd/M-Cryst] Hives    Neuromuscular Blockers, Steroidal Other (comments)     \"patient feels crazy\"     REVIEW OF SYSTEMS: gyn 2012, mammo 12/16, DEXA 4/17, colo 12/13 Dr Milka Godfrey, Dr Jose Gottlieb  Ophtho  no vision change or eye pain  Oral  no mouth pain, tongue or tooth problems  Ears  no hearing loss, ear pain, fullness, no swallowing problems  Cardiac  no CP, PND, orthopnea, edema, palpitations or syncope  Chest  no breast masses  Resp  no wheezing, chronic coughing, dyspnea  GI  no heartburn, nausea, vomiting, change in bowel habits, bleeding, hemorrhoids  Urinary  no dysuria, hematuria, flank pain, urgency, frequency  Constitutional  no wt loss, night sweats, unexplained fevers  Neuro  no focal weakness, numbness, paresthesias, incoordination, ataxia, involuntary movements  Endo - no polyuria, polydipsia, nocturia, hot flashes    Visit Vitals    /78 (BP 1 Location: Left arm, BP Patient Position: Sitting)    Pulse 97    Temp 98.1 °F (36.7 °C) (Oral)    Resp 14    Ht 5' 3\" (1.6 m)    Wt 133 lb 3.2 oz (60.4 kg)    SpO2 99%    BMI 23.6 kg/m2   A&O x3  Affect is appropriate. Mood stable  No apparent distress  Anicteric, no JVD, adenopathy or thyromegaly. No carotid bruits or radiated murmur  Lungs clear to auscultation, no wheezes or rales  Heart showed regular rate and rhythm. No murmur, rubs, gallops  Abdomen soft nontender, no hepatosplenomegaly or masses.    Foot exam bilaterally showed  Reflexes 2+   Vibration, proprioception, filament test intact  Pulses 1-2+ DP and PT    LABS  From 9/10 showed   gluc 106, cr 0.70, gfr>60, alt 42,                   chol 238, tg 154, hdl 54, ldl-c 153, wbc 6.5, hb 13.1, plt 258, ua neg  From 2/11 showed   gluc 95,   cr 0.90 wbc 7.0, hb 14.1, plt 308  From 3/12 showed   gluc 103, cr 0.90              alt 43,                                                                           wbc 6.2, hb 13.7, plt 285  From 3/13 showed   gluc 130, cr 0.60,             alt 22,                    chol 244, tg 180, hdl 57, ldl-c 151,                                          ua neg, tsh 1.79  From 9/13 showed   gluc 119, cr 0.70, gfr>60,            hba1c 6.6, chol 228, tg 145, hdl 62, ldl-c 137, 2hr   From 2/14 showed   gluc 95,   cr 0.60, gfr>61, alt 20, hba1c 5.7,                    umar 2.8  From 4/14 showed   gluc 144, cr 0.98, gfr>60, alt 31,          wbc 7.6, hb 13.4, plt 317, ck/trop neg  From 10/14 showed gluc 98,   cr 0.60, gfr>60, alt 20,          chol 163, tg 96,   hdl 64, ldl-c 80  From 4/15 showed   gluc 96,   cr 0.70, gfr>60, alt 19, hba1c 6.1, chol 207, tg 126, hdl 68, ldl-c 114  From 10/15 showed        hba1c 6.3, chol 188, tg 118, hdl 66, ldl-c 98,   wbc 5.3, hb 13.3, plt 287, umar neg  From 4/16 showed   gluc 98,   cr 0.60, gfr>60, alt 19, hba1c 6.3, chol 170, tg 141, hdl 57, ldl-c 85  From 10/16 showed gluc 137, cr 0.50, gfr>60, alt 17, hba1c 6.2, chol 176, tg 302, hdl 54, ldl-c 62,   wbc 8.9, hb 12.3, plt 393, umar neg  From 1/17 showed   gluc 114, cr 0.60, gfr>60,           wbc 6.1, hb 12.9, plt 356  From 4/17 showed        hba1c 6.8, chol 223, tg 240, hdl 53, ldl-c 122    Results for orders placed or performed in visit on 08/31/17   MICROALBUMIN, UR, RAND W/ MICROALBUMIN/CREA RATIO   Result Value Ref Range    Creatinine, urine 190 mg/dL    Microalbumin, urine <12.0 0.1 - 17.0 ug/mL    Microalb/Creat ratio (ug/mg creat.)  0.0 - 30.0 mcg/mg of Creatinine   METABOLIC PANEL, COMPREHENSIVE   Result Value Ref Range    Glucose 109 (H) 65 - 99 mg/dL    BUN 15 6 - 22 mg/dL    Creatinine 0.7 (L) 0.8 - 1.4 mg/dL    Sodium 136 133 - 145 mmol/L    Potassium 4.3 3.5 - 5.5 mmol/L    Chloride 96 (L) 98 - 110 mmol/L    CO2 22 20 - 32 mmol/L    AST (SGOT) 15 10 - 37 U/L    ALT (SGPT) 16 5 - 40 U/L    Alk. phosphatase 67 40 - 120 U/L    Bilirubin, total <0.1 (L) 0.2 - 1.2 mg/dL    Calcium 9.1 8.4 - 10.5 mg/dL    Protein, total 6.6 6.2 - 8.1 g/dL    Albumin 4.1 3.5 - 5.0 g/dL    A-G Ratio 1.6 1.1 - 2.6 ratio    Globulin 2.5 2.0 - 4.0 g/dL    Anion gap 18.0 mmol/L    GFRAA >60.0 >60.0    GFRNA >60.0 >60.0   HEMOGLOBIN A1C W/O EAG   Result Value Ref Range    Hemoglobin A1c 6.7 (H) 4.8 - 5.9 %    AVG  (H) 91 - 123 mg/dL     Patient Active Problem List   Diagnosis Code    Bronchiectasis Dr. Radha Amato J47.9    Osteopenia  M85.80    Hyperlipidemia E78.5    Gastroesophageal reflux disease without esophagitis K21.9    [facility] C[facility]    Uncontrolled type 2 diabetes mellitus E11.65     Assessment and plan:  1. Pulm. F/U Dr Enoch Mendiola and continue current  2. GERD. Continue ppi and avoidance measures  3. Osteopenia. Ca/d, wt bearing exercises  4. DM. Will dec to met ER 500m and add actos 15, sfx discussed at length. F/U Dr Alethea Montano, podiatry as needed. 5. Lipids. Check labs next draw  6  Breast ca. Per Dr Jesus Britton  7. Immunizations. Flu later this season at work        RTC 3/18    Above conditions discussed at length and patient vocalized understanding.   All questions answered to patient satifaction

## 2017-09-07 ENCOUNTER — OFFICE VISIT (OUTPATIENT)
Dept: INTERNAL MEDICINE CLINIC | Age: 67
End: 2017-09-07

## 2017-09-07 VITALS
HEIGHT: 63 IN | TEMPERATURE: 98.1 F | WEIGHT: 133.2 LBS | DIASTOLIC BLOOD PRESSURE: 78 MMHG | BODY MASS INDEX: 23.6 KG/M2 | SYSTOLIC BLOOD PRESSURE: 110 MMHG | RESPIRATION RATE: 14 BRPM | HEART RATE: 97 BPM | OXYGEN SATURATION: 99 %

## 2017-09-07 DIAGNOSIS — C50.111 MALIGNANT NEOPLASM OF CENTRAL PORTION OF RIGHT FEMALE BREAST (HCC): ICD-10-CM

## 2017-09-07 DIAGNOSIS — K21.9 GASTROESOPHAGEAL REFLUX DISEASE WITHOUT ESOPHAGITIS: ICD-10-CM

## 2017-09-07 DIAGNOSIS — J47.1 BRONCHIECTASIS WITH ACUTE EXACERBATION (HCC): ICD-10-CM

## 2017-09-07 DIAGNOSIS — E78.5 HYPERLIPIDEMIA, UNSPECIFIED HYPERLIPIDEMIA TYPE: ICD-10-CM

## 2017-09-07 RX ORDER — PIOGLITAZONEHYDROCHLORIDE 15 MG/1
TABLET ORAL
Qty: 30 TAB | Refills: 11 | Status: SHIPPED | OUTPATIENT
Start: 2017-09-07 | End: 2018-01-09 | Stop reason: SINTOL

## 2017-09-07 NOTE — PROGRESS NOTES
1. Have you been to the ER, urgent care clinic or hospitalized since your last visit? NO.     2. Have you seen or consulted any other health care providers outside of the [address] [place] [address] since your last visit (Include any pap smears or colon screening)? NO      Do you have an Advanced Directive? NO    Would you like information on Advanced Directives? YES    Provided information on advance directive.

## 2017-09-07 NOTE — MR AVS SNAPSHOT
Visit Information Date & Time Provider Department Dept. Phone Encounter #  
 9/7/2017  2:45 PM Yvette Benitez MD Internist of [address] [id number] Your Appointments 12/27/2017  8:25 AM  
LAB with [facility] NURSE VISIT Internist of [facility] ([address]) Appt Note: labs for ov 01/09/18 rd  
 [address] [facility], [address] [facility] [address]  
  
   
 [address], [address]  
  
    
 1/9/2018  2:30 PM  
Office Visit with Yvette Benitez MD  
Internist of [address] [facility] [address]) Appt Note: ov 4mos. rd  
 [address], [address] [id number] [address]  
  
   
 [address], [address] Upcoming Health Maintenance Date Due  
 FOOT EXAM Q1 4/22/2017 INFLUENZA AGE 9 TO ADULT 8/1/2017 Pneumococcal 19-64 Highest Risk (3 of 3 - PPSV23) 9/16/2017* HEMOGLOBIN A1C Q6M 2/28/2018 EYE EXAM RETINAL OR DILATED Q1 3/30/2018 LIPID PANEL Q1 4/25/2018 MICROALBUMIN Q1 8/31/2018 BREAST CANCER SCRN MAMMOGRAM 12/22/2018 COLONOSCOPY 4/22/2026 DTaP/Tdap/Td series (2 - Td) 5/3/2027 *Topic was postponed. The date shown is not the original due date. Allergies as of 9/7/2017  Review Complete On: 9/7/2017 By: Liana De La Torre Severity Noted Reaction Type Reactions Avelox [Moxifloxacin]  01/16/2012    Other (comments) Felt jittery Crestor [Rosuvastatin]  10/23/2014    Myalgia Evista [Raloxifene]    Other (comments) Severe flashes Lipitor [Atorvastatin]  11/07/2013   Side Effect Other (comments) Leg cramps and muscle aches Macrobid [Nitrofurantoin Monohyd/m-cryst]    [employer] Neuromuscular Blockers, Steroidal    Other (comments) \"patient feels crazy\" Current Immunizations  Reviewed on 5/16/2017 Name Date Influenza Vaccine 10/23/2014 Influenza Vaccine (Quad) PF 10/30/2015 Influenza Vaccine PF 10/2/2013 Influenza Vaccine Split 11/28/2012  9:38 AM, 1/16/2012  3:35 PM  
 Influenza Vaccine Whole 9/22/2010 Pneumococcal Conjugate (PCV-13) 10/25/2016 TB Skin Test (PPD) 8/1/2009 ZZZ-RETIRED (DO NOT USE) Pneumococcal Vaccine (Unspecified Type) 1/1/2008 Not reviewed this visit Vitals BP Pulse Temp Resp Height(growth percentile) Weight(growth percentile) 110/78 (BP 1 Location: Left arm, BP Patient Position: Sitting) 97 98.1 °F (36.7 °C) (Oral) 14 5' 3\" (1.6 m) 133 lb 3.2 oz (60.4 kg) SpO2 BMI OB Status Smoking Status 99% 23.6 kg/m2 Postmenopausal Former Smoker Vitals History BMI and BSA Data Body Mass Index Body Surface Area  
 23.6 kg/m 2 1.64 m 2 Preferred Pharmacy Pharmacy Name Phone [id number] [address] [facility] [phone] Your Updated Medication List  
  
   
This list is accurate as of: 9/7/17  3:34 PM.  Always use your most recent med list.  
  
  
  
  
 [place] Silence 250-50 mcg/dose diskus inhaler Generic drug:  fluticasone-salmeterol TAKE 1 PUFF BY INHALATION TWO (2) TIMES A DAY. ADVIL PO Take  by mouth as needed. albuterol 90 mcg/actuation inhaler Commonly known as:  VENTOLIN HFA Take 2 Puffs by inhalation every six (6) hours as needed for Wheezing. Indications: BRONCHOSPASTIC PULMONARY DISEASE B COMPLEX 1 tablet Generic drug:  b complex vitamins Take 1 Tab by mouth daily. DEXILANT 60 mg Cpdb Generic drug:  Dexlansoprazole Take 60 mg by mouth daily. HYDROcodone-acetaminophen 5-325 mg per tablet Commonly known as:  Ruffus Homme Take 1-2 Tabs by mouth every four (4) hours as needed for Pain. Max Daily Amount: 12 Tabs. metFORMIN  mg tablet Commonly known as:  GLUCOPHAGE XR Take 2 Tabs by mouth daily (with dinner). pravastatin 40 mg tablet Commonly known as:  PRAVACHOL Take 1 Tab by mouth nightly. tamoxifen 10 mg tablet Commonly known as:  NOLVADEX Take  by mouth daily. temazepam 30 mg capsule Commonly known as:  RESTORIL  
TAKE ONE CAPSULE BY MOUTH EVERY NIGHT AT BEDTIME AS NEEDED  
  
 VITAMIN D3 1,000 unit Cap Generic drug:  cholecalciferol Take  by mouth. Introducing [facility]! Dear Prabhjot Bravo: Thank you for requesting a Setup account. Our records indicate that you already have an active Setup account. You can access your account anytime at [email or web address] Did you know that you can access your hospital and ER discharge instructions at any time in Setup? You can also review all of your test results from your hospital stay or ER visit. Additional Information If you have questions, please visit the Frequently Asked Questions section of the Setup website at [email or web address] Remember, Setup is NOT to be used for urgent needs. For medical emergencies, dial 911. Now available from your iPhone and Android! Please provide this summary of care documentation to your next provider. Your primary care clinician is listed as Alejandro Robertson. If you have any questions after today's visit, please call [phone].

## 2017-10-03 RX ORDER — FLUTICASONE PROPIONATE AND SALMETEROL 50; 250 UG/1; UG/1
POWDER RESPIRATORY (INHALATION)
Qty: 1 INHALER | Refills: 5 | Status: SHIPPED | OUTPATIENT
Start: 2017-10-03

## 2017-10-25 RX ORDER — TEMAZEPAM 30 MG/1
30 CAPSULE ORAL
Qty: 30 CAP | Refills: 1 | OUTPATIENT
Start: 2017-10-27 | End: 2017-12-26 | Stop reason: SDUPTHER

## 2017-10-25 NOTE — TELEPHONE ENCOUNTER
PHONE IN RX    Last Visit: 09/07/2017 with MD Michelle Sotelo    Next Appointment: 01/09/2018 with MD Michelle Sotelo   Previous Refill Encounters: 08/28/2017 per MD Michelle Sotelo #30 with 1 refill     Requested Prescriptions     Pending Prescriptions Disp Refills    temazepam (RESTORIL) 30 mg capsule 30 Cap 1     Sig: Take 1 Cap by mouth nightly as needed.  Max Daily Amount: 30 mg. DO NOT FILL BEFORE 10/27/2017

## 2017-11-25 DIAGNOSIS — E78.5 HYPERLIPIDEMIA, UNSPECIFIED HYPERLIPIDEMIA TYPE: ICD-10-CM

## 2017-11-27 RX ORDER — PRAVASTATIN SODIUM 40 MG/1
TABLET ORAL
Qty: 90 TAB | Refills: 0 | Status: SHIPPED | OUTPATIENT
Start: 2017-11-27 | End: 2018-02-25 | Stop reason: SDUPTHER

## 2017-12-26 DIAGNOSIS — G47.00 INSOMNIA, UNSPECIFIED TYPE: Primary | ICD-10-CM

## 2017-12-26 LAB
A-G RATIO,AGRAT: 1.8 RATIO (ref 1.1–2.6)
ALBUMIN SERPL-MCNC: 4 G/DL (ref 3.5–5)
ALP SERPL-CCNC: 67 U/L (ref 40–120)
ALT SERPL-CCNC: 17 U/L (ref 5–40)
ANION GAP SERPL CALC-SCNC: 15 MMOL/L
AST SERPL W P-5'-P-CCNC: 14 U/L (ref 10–37)
AVG GLU, 10930: 149 MG/DL (ref 91–123)
BILIRUB SERPL-MCNC: 0.1 MG/DL (ref 0.2–1.2)
BUN SERPL-MCNC: 19 MG/DL (ref 6–22)
CALCIUM SERPL-MCNC: 8.9 MG/DL (ref 8.4–10.5)
CHLORIDE SERPL-SCNC: 104 MMOL/L (ref 98–110)
CHOLEST SERPL-MCNC: 154 MG/DL (ref 110–200)
CO2 SERPL-SCNC: 24 MMOL/L (ref 20–32)
CREAT SERPL-MCNC: 0.6 MG/DL (ref 0.8–1.4)
GFRAA, 66117: >60
GFRNA, 66118: >60
GLOBULIN,GLOB: 2.2 G/DL (ref 2–4)
GLUCOSE SERPL-MCNC: 104 MG/DL (ref 70–99)
HBA1C MFR BLD HPLC: 6.8 % (ref 4.8–5.9)
HDLC SERPL-MCNC: 60 MG/DL (ref 40–59)
LDLC SERPL CALC-MCNC: 55 MG/DL (ref 50–99)
POTASSIUM SERPL-SCNC: 4.4 MMOL/L (ref 3.5–5.5)
PROT SERPL-MCNC: 6.2 G/DL (ref 6.2–8.1)
SODIUM SERPL-SCNC: 143 MMOL/L (ref 133–145)
TRIGL SERPL-MCNC: 197 MG/DL (ref 40–149)
VLDLC SERPL CALC-MCNC: 39 MG/DL (ref 8–30)

## 2017-12-26 RX ORDER — TEMAZEPAM 30 MG/1
30 CAPSULE ORAL
Qty: 30 CAP | Refills: 1 | OUTPATIENT
Start: 2017-12-26 | End: 2018-02-23 | Stop reason: SDUPTHER

## 2017-12-26 NOTE — TELEPHONE ENCOUNTER
PHONE IN [facility] reports the last fill date for Restoril as 11/25/2017 for a 30 d/s. There appears to be no inconsistencies in regards to the prescribing of this medication. Last Visit: 09/07/2017 with MD Cyrus Smith    Next Appointment: 01/09/2018 with MD Cyrus Smith   Previous Refill Encounters: 10/27/2017 per MD Cyrus Smith #30 with 1 refill     Requested Prescriptions     Pending Prescriptions Disp Refills    temazepam (RESTORIL) 30 mg capsule 30 Cap 1     Sig: Take 1 Cap by mouth nightly as needed. Max Daily Amount: 30 mg.

## 2018-01-04 NOTE — PROGRESS NOTES
74 y.o. WHITE OR  female who presents for RPE    She continues to tamox for RIGHT breast ca being followed by Dr Steve Griffin    No cardiovascular complaints. She is walking getting up to 10K steps daily     Denies polyuria, polydipsia, nocturia, vision change. We had cut the metformin dosing to 500 and added actos but she could not tolerate it due to weight gain and generally not feeling well while on it. Getting nfbs readings in the high 100-low 200 range    Vitals 1/9/2018 9/7/2017 6/28/2017 5/31/2017 5/16/2017   Weight 139 lb 133 lb 3.2 oz  135 lb 134 lb     Past Medical History:   Diagnosis Date    ADD (attention deficit disorder) 2014    [facility]    Amebic colitis 1970s    Arthritis     s/p cortisone left knee    Bronchiectasis     Dr. Clark Gold; R[facility]    Bronchiectasis ([facility] [address]     FHx: breast cancer     Fibrocystic breast disease     Dr. Rosaura Delgado GERD (gastroesophageal reflux disease)     Dr. Billy Allen s/p dilation 2/12    H/O pulmonary function tests 2011    ratio 70, FEV1 81 no change postbd, TLC 93, RV 94, DLCO 81    History of right breast cancer 02/2016    Dr Steve Griffin; RIGHT partial mastectomy, adjuvant XRT    Hyperlipidemia     Insomnia     Left rotator cuff tear 2000s    conservative therapy    Osteopenia     DEXA t-score -1.2 spine, -0.2 hip (4/08);  spine -1.2, hip 0.1 (10/10);  -1.8 spine, -0.3 FRAX 6.3/0.2 (7/13);  [facility] -1.7 spine, 0.1 hip (4/17)    Spontaneous pneumothorax 1970s    x3    Type II or unspecified type diabetes mellitus      on basis of elev a1c 9/13    Zoster right S1 3/12     Past Surgical History:   Procedure Laterality Date    BREAST SURGERY PROCEDURE UNLISTED  2/11    Left nipple duct exploration and excisional biopsy    CARDIAC SURG PROCEDURE UNLIST  4/14    Thallim negative, ef 75%    HX BREAST BIOPSY  4/12    Right Breast biopsy w/needle    HX BREAST LUMPECTOMY Right 02/14/2017    HX COLONOSCOPY      Dr. Billy Allen negative 2005; diverticulosis 12/13  [facility]; Dr Jessica Leigh 11/27/17 neg    HX HYSTERECTOMY  1993    fibroids/ovarian cysts - Dr. Kvng Salazar ORTHOPAEDIC  03/2017    Dr Alice Nowak; left radial fx, left scaphoid fx after fall     Social History     Social History    Marital status:      Spouse name: N/A    Number of children: 2    Years of education: N/A     Occupational History   2021 [employer]     Social History Main Topics    Smoking status: Former Smoker     Packs/day: 1.00     Years: 5.00     Quit date: 8/1/1973    Smokeless tobacco: Never Used    Alcohol use No    Drug use: No    Sexual activity: Not on file     Other Topics Concern    Not on file     Social History Narrative     Current Outpatient Prescriptions   Medication Sig    SITagliptin-metFORMIN (JANUMET)  mg per tablet Take 1 Tab by mouth two (2) times daily (with meals).  temazepam (RESTORIL) 30 mg capsule Take 1 Cap by mouth nightly as needed. Max Daily Amount: 30 mg.  pravastatin (PRAVACHOL) 40 mg tablet TAKE 1 TAB BY MOUTH BY MOUTH NIGHTLY.  ADVAIR DISKUS 250-50 mcg/dose diskus inhaler TAKE 1 PUFF BY INHALATION TWO (2) TIMES A DAY.  tamoxifen (NOLVADEX) 10 mg tablet Take  by mouth daily.  albuterol (VENTOLIN HFA) 90 mcg/actuation inhaler Take 2 Puffs by inhalation every six (6) hours as needed for Wheezing. Indications: BRONCHOSPASTIC PULMONARY DISEASE    DEXILANT 60 mg CpDB Take 60 mg by mouth daily.  b complex vitamins (B COMPLEX 1) tablet Take 1 Tab by mouth daily.  Cholecalciferol, Vitamin D3, (VITAMIN D3) 1,000 unit cap Take  by mouth.  IBUPROFEN (ADVIL PO) Take  by mouth as needed.  HYDROcodone-acetaminophen (NORCO) 5-325 mg per tablet Take 1-2 Tabs by mouth every four (4) hours as needed for Pain. Max Daily Amount: 12 Tabs. No current facility-administered medications for this visit.       Allergies   Allergen Reactions    Actos [Pioglitazone] Other (comments)     Felt bad, weight gain    Avelox [Moxifloxacin] Other (comments)     Felt jittery    Crestor [Rosuvastatin] Myalgia    Evista [Raloxifene] Other (comments)     Severe flashes    Lipitor [Atorvastatin] Other (comments)     Leg cramps and muscle aches    Macrobid [Nitrofurantoin Monohyd/M-Cryst] Hives    Neuromuscular Blockers, Steroidal Other (comments)     \"patient feels crazy\"     REVIEW OF SYSTEMS: gyn 2012, mammo 12/16, DEXA 4/17, colo 12/13 Dr Eileen Lovell, Dr Yvonne Dent  Ophtho  no vision change or eye pain  Oral  no mouth pain, tongue or tooth problems  Ears  no hearing loss, ear pain, fullness, no swallowing problems  Cardiac  no CP, PND, orthopnea, edema, palpitations or syncope  Chest  no breast masses  Resp  no wheezing, chronic coughing, dyspnea  GI  no heartburn, nausea, vomiting, change in bowel habits, bleeding, hemorrhoids  Urinary  no dysuria, hematuria, flank pain, urgency, frequency  Constitutional  no wt loss, night sweats, unexplained fevers  Neuro  no focal weakness, numbness, paresthesias, incoordination, ataxia, involuntary movements  Endo - no polyuria, polydipsia, nocturia, hot flashes    Visit Vitals    /70 (BP 1 Location: Right arm, BP Patient Position: Sitting)    Pulse 91    Temp 97.8 °F (36.6 °C) (Oral)    Resp 14    Ht 5' 3\" (1.6 m)    Wt 139 lb (63 kg)    SpO2 98%    BMI 24.62 kg/m2   A&O x3  Affect is appropriate. Mood stable  No apparent distress  Anicteric, no JVD, adenopathy or thyromegaly. No carotid bruits or radiated murmur  Lungs clear to auscultation, no wheezes or rales  Heart showed regular rate and rhythm. No murmur, rubs, gallops  Abdomen soft nontender, no hepatosplenomegaly or masses.    Foot exam bilaterally showed  Reflexes 2+   Vibration, proprioception, filament test intact  Pulses 1-2+ DP and PT    LABS  From 9/10 showed   gluc 106, cr 0.70, gfr>60, alt 42,                   chol 238, tg 154, hdl 54, ldl-c 153, wbc 6.5, hb 13.1, plt 258, ua neg  From 2/11 showed gluc 95,   cr 0.90                                                                                                   wbc 7.0, hb 14.1, plt 308  From 3/12 showed   gluc 103, cr 0.90              alt 43,                                                                           wbc 6.2, hb 13.7, plt 285  From 3/13 showed   gluc 130, cr 0.60,             alt 22,                    chol 244, tg 180, hdl 57, ldl-c 151,                                          ua neg, tsh 1.79  From 9/13 showed   gluc 119, cr 0.70, gfr>60,            hba1c 6.6, chol 228, tg 145, hdl 62, ldl-c 137, 2hr   From 2/14 showed   gluc 95,   cr 0.60, gfr>61, alt 20, hba1c 5.7,                    umar 2.8  From 4/14 showed   gluc 144, cr 0.98, gfr>60, alt 31,          wbc 7.6, hb 13.4, plt 317, ck/trop neg  From 10/14 showed gluc 98,   cr 0.60, gfr>60, alt 20,          chol 163, tg 96,   hdl 64, ldl-c 80  From 4/15 showed   gluc 96,   cr 0.70, gfr>60, alt 19, hba1c 6.1, chol 207, tg 126, hdl 68, ldl-c 114  From 10/15 showed        hba1c 6.3, chol 188, tg 118, hdl 66, ldl-c 98,   wbc 5.3, hb 13.3, plt 287, umar neg  From 4/16 showed   gluc 98,   cr 0.60, gfr>60, alt 19, hba1c 6.3, chol 170, tg 141, hdl 57, ldl-c 85  From 10/16 showed gluc 137, cr 0.50, gfr>60, alt 17, hba1c 6.2, chol 176, tg 302, hdl 54, ldl-c 62,   wbc 8.9, hb 12.3, plt 393, umar neg  From 1/17 showed   gluc 114, cr 0.60, gfr>60,           wbc 6.1, hb 12.9, plt 356  From 4/17 showed        hba1c 6.8, chol 223, tg 240, hdl 53, ldl-c 122  From 9/17 showed   gluc 109, cr 0.70, gfr>60, alt 16, hba1c 6.7,                  umar neg    Results for orders placed or performed in visit on 12/26/17   LIPID PANEL   Result Value Ref Range    Triglyceride 197 (H) 40 - 149 mg/dL    HDL Cholesterol 60 (H) 40 - 59 mg/dL    Cholesterol, total 154 110 - 200 mg/dL    LDL, calculated 55 50 - 99 mg/dL    VLDL, calculated 39 (H) 8 - 30 mg/dL   METABOLIC PANEL, COMPREHENSIVE   Result Value Ref Range    Glucose 104 (H) 70 - 99 mg/dL    BUN 19 6 - 22 mg/dL    Creatinine 0.6 (L) 0.8 - 1.4 mg/dL    Sodium 143 133 - 145 mmol/L    Potassium 4.4 3.5 - 5.5 mmol/L    Chloride 104 98 - 110 mmol/L    CO2 24 20 - 32 mmol/L    AST (SGOT) 14 10 - 37 U/L    ALT (SGPT) 17 5 - 40 U/L    Alk. phosphatase 67 40 - 120 U/L    Bilirubin, total 0.1 (L) 0.2 - 1.2 mg/dL    Calcium 8.9 8.4 - 10.5 mg/dL    Protein, total 6.2 6.2 - 8.1 g/dL    Albumin 4.0 3.5 - 5.0 g/dL    A-G Ratio 1.8 1.1 - 2.6 ratio    Globulin 2.2 2.0 - 4.0 g/dL    Anion gap 15.0 mmol/L    GFRAA >60.0 >60.0    GFRNA >60.0 >60.0   HEMOGLOBIN A1C W/O EAG   Result Value Ref Range    Hemoglobin A1c 6.8 (H) 4.8 - 5.9 %    AVG  (H) 91 - 123 mg/dL     Patient Active Problem List   Diagnosis Code    Bronchiectasis Dr. Jodie Murcia J47.9    Osteopenia  M85.80    Hyperlipidemia E78.5    Gastroesophageal reflux disease without esophagitis K21.9    Malignant neoplasm of right female breast Dr Sanjeev Mcfarlane C50.911    Uncontrolled type 2 diabetes mellitus E11.65     Assessment and plan:  1. Pulm. F/U Dr Masters [place] and continue current  2. GERD. Continue ppi and avoidance measures  3. Osteopenia. Ca/d, wt bearing exercises  4. DM. Discussed various options including ARRIAGA, glp, sglt2 and dpp4. She settled on changing to janumet, sfx discussed at length   5. Lipids. Continue current regimen. 6  Breast ca. Per Dr Sanjeev Mcfarlane  7. Immunizations. Second pvx-23 in future         RTC 5/18    Above conditions discussed at length and patient vocalized understanding.   All questions answered to patient satifaction

## 2018-01-09 ENCOUNTER — OFFICE VISIT (OUTPATIENT)
Dept: INTERNAL MEDICINE CLINIC | Age: 68
End: 2018-01-09

## 2018-01-09 VITALS
RESPIRATION RATE: 14 BRPM | DIASTOLIC BLOOD PRESSURE: 70 MMHG | SYSTOLIC BLOOD PRESSURE: 116 MMHG | WEIGHT: 139 LBS | TEMPERATURE: 97.8 F | OXYGEN SATURATION: 98 % | HEIGHT: 63 IN | BODY MASS INDEX: 24.63 KG/M2 | HEART RATE: 91 BPM

## 2018-01-09 DIAGNOSIS — E78.5 HYPERLIPIDEMIA, UNSPECIFIED HYPERLIPIDEMIA TYPE: ICD-10-CM

## 2018-01-09 DIAGNOSIS — C50.911 MALIGNANT NEOPLASM OF RIGHT FEMALE BREAST, UNSPECIFIED ESTROGEN RECEPTOR STATUS, UNSPECIFIED SITE OF BREAST (HCC): ICD-10-CM

## 2018-01-09 NOTE — PROGRESS NOTES
1. Have you been to the ER, urgent care clinic or hospitalized since your last visit? NO.     2. Have you seen or consulted any other health care providers outside of the [address] [place] [address] since your last visit (Include any pap smears or colon screening)? NO      Do you have an Advanced Directive? NO    Would you like information on Advanced Directives?  NO

## 2018-01-09 NOTE — MR AVS SNAPSHOT
Visit Information Date & Time Provider Department Dept. Phone Encounter #  
 1/9/2018  2:30 PM Sarah Anaya MD [facility] [phone] Your Appointments 5/4/2018  8:45 AM  
LAB with [facility] NURSE VISIT [facility] ([address]) Appt Note: labs [address], [address] [place] [address] [address] [place]  
  
   
 [address], [address] [address] [address] [address]  
  
    
 5/10/2018  2:30 PM  
Office Visit with Sarah Anaya MD  
[facility] [address]) Appt Note: ov [address]  
 [address], [address] [id number] [address] [place]  
  
   
 [address], [place] Upcoming Health Maintenance Date Due Influenza Age 5 to Adult 8/1/2017 Pneumococcal 65+ High/Highest Risk (2 of 2 - PPSV23) 9/28/2017 MEDICARE YEARLY EXAM 9/28/2017 EYE EXAM RETINAL OR DILATED Q1 3/30/2018 HEMOGLOBIN A1C Q6M 6/26/2018 MICROALBUMIN Q1 8/31/2018 FOOT EXAM Q1 9/7/2018 BREAST CANCER SCRN MAMMOGRAM 12/22/2018 LIPID PANEL Q1 12/26/2018 GLAUCOMA SCREENING Q2Y 3/30/2019 DTaP/Tdap/Td series (2 - Td) 5/3/2027 COLONOSCOPY 11/27/2027 Allergies as of 1/9/2018  Review Complete On: 1/9/2018 By: Razia Fiore Severity Noted Reaction Type Reactions Avelox [Moxifloxacin]  01/16/2012    Other (comments) Felt jittery Crestor [Rosuvastatin]  10/23/2014    Myalgia Evista [Raloxifene]    Other (comments) Severe flashes Lipitor [Atorvastatin]  11/07/2013   Side Effect Other (comments) Leg cramps and muscle aches Macrobid [Nitrofurantoin Monohyd/m-cryst]    [employer] Neuromuscular Blockers, Steroidal    Other (comments) \"patient feels crazy\" Current Immunizations  Reviewed on 9/7/2017 Name Date Influenza Vaccine 10/1/2016, 10/23/2014 Influenza Vaccine (Quad) PF 10/30/2015 Influenza Vaccine PF 10/2/2013 Influenza Vaccine Split 11/28/2012  9:38 AM, 1/16/2012  3:35 PM  
 Influenza Vaccine Whole 9/22/2010 Pneumococcal Conjugate (PCV-13) 10/25/2016 TB Skin Test (PPD) 8/1/2009 ZZZ-RETIRED (DO NOT USE) Pneumococcal Vaccine (Unspecified Type) 1/1/2008 Not reviewed this visit Vitals BP Pulse Temp Resp Height(growth percentile) Weight(growth percentile) 116/70 (BP 1 Location: Right arm, BP Patient Position: Sitting) 91 97.8 °F (36.6 °C) (Oral) 14 5' 3\" (1.6 m) 139 lb (63 kg) SpO2 BMI OB Status Smoking Status 98% 24.62 kg/m2 Postmenopausal Former Smoker Vitals History BMI and BSA Data Body Mass Index Body Surface Area  
 24.62 kg/m 2 1.67 m 2 Preferred Pharmacy Pharmacy Name Phone [id number] [address] [place] [phone] Your Updated Medication List  
  
   
This list is accurate as of: 1/9/18  3:29 PM.  Always use your most recent med list.  
  
  
  
  
 [place] Care 250-50 mcg/dose diskus inhaler Generic drug:  fluticasone-salmeterol TAKE 1 PUFF BY INHALATION TWO (2) TIMES A DAY. ADVIL PO Take  by mouth as needed. albuterol 90 mcg/actuation inhaler Commonly known as:  VENTOLIN HFA Take 2 Puffs by inhalation every six (6) hours as needed for Wheezing. Indications: BRONCHOSPASTIC PULMONARY DISEASE B COMPLEX 1 tablet Generic drug:  b complex vitamins Take 1 Tab by mouth daily. DEXILANT 60 mg Cpdb Generic drug:  Dexlansoprazole Take 60 mg by mouth daily. HYDROcodone-acetaminophen 5-325 mg per tablet Commonly known as:  [place] Take 1-2 Tabs by mouth every four (4) hours as needed for Pain. Max Daily Amount: 12 Tabs. metFORMIN  mg tablet Commonly known as:  GLUCOPHAGE XR Take 2 Tabs by mouth daily (with dinner). pioglitazone 15 mg tablet Commonly known as:  ACTOS One daily  
  
 pravastatin 40 mg tablet Commonly known as:  PRAVACHOL  
TAKE 1 TAB BY MOUTH BY MOUTH NIGHTLY.  
  
 tamoxifen 10 mg tablet Commonly known as:  NOLVADEX Take  by mouth daily. temazepam 30 mg capsule Commonly known as:  RESTORIL Take 1 Cap by mouth nightly as needed. Max Daily Amount: 30 mg. VITAMIN D3 1,000 unit Cap Generic drug:  cholecalciferol Take  by mouth. Introducing [facility]! Dear Lloyd: Thank you for requesting a [employer] account. Our records indicate that you already have an active [employer] account. You can access your account anytime at [email or web address] Did you know that you can access your hospital and ER discharge instructions at any time in [employer]? You can also review all of your test results from your hospital stay or ER visit. Additional Information If you have questions, please visit the Frequently Asked Questions section of the [employer] website at [email or web address] Remember, [employer] is NOT to be used for urgent needs. For medical emergencies, dial 911. Now available from your iPhone and Android! Please provide this summary of care documentation to your next provider. Your primary care clinician is listed as Heidi Avila. If you have any questions after today's visit, please call [phone].

## 2018-01-17 NOTE — TELEPHONE ENCOUNTER
Pt started rx Janumet 01/09 and stopped taking it 01/14 as its making her extremely dizzy and off balance    Please advise

## 2018-01-18 RX ORDER — METFORMIN HYDROCHLORIDE 500 MG/1
1000 TABLET, EXTENDED RELEASE ORAL
Qty: 180 TAB | Refills: 3 | Status: SHIPPED | OUTPATIENT
Start: 2018-01-18 | End: 2018-05-10

## 2018-01-18 RX ORDER — METFORMIN HYDROCHLORIDE 500 MG/1
1000 TABLET, EXTENDED RELEASE ORAL
COMMUNITY
End: 2018-01-18 | Stop reason: SDUPTHER

## 2018-01-18 NOTE — TELEPHONE ENCOUNTER
Pt aware of message below and verbalized understanding. No further questions or concerns from pt at this time. Pt requested refill of Metformin.      Last ov 1/9/18

## 2018-01-25 ENCOUNTER — TELEPHONE (OUTPATIENT)
Dept: PULMONOLOGY | Age: 68
End: 2018-01-25

## 2018-01-25 NOTE — TELEPHONE ENCOUNTER
Pt c/o stabbing pain in left lower lung area. In past she was put on antibiotics. Pt to call Dr. Mcduffie Flora to see if she can get an appt for him to evaluate since Dr. Coronado Officer not in office. Also pt to call us back to schedule appt with our office. Last visit 5/2017, was due in Sept 2017.  Pt agrees with plan

## 2018-01-26 ENCOUNTER — OFFICE VISIT (OUTPATIENT)
Dept: INTERNAL MEDICINE CLINIC | Age: 68
End: 2018-01-26

## 2018-01-26 VITALS
HEIGHT: 63 IN | DIASTOLIC BLOOD PRESSURE: 62 MMHG | TEMPERATURE: 98 F | WEIGHT: 136.4 LBS | HEART RATE: 106 BPM | RESPIRATION RATE: 19 BRPM | SYSTOLIC BLOOD PRESSURE: 122 MMHG | OXYGEN SATURATION: 99 % | BODY MASS INDEX: 24.17 KG/M2

## 2018-01-26 DIAGNOSIS — J01.00 ACUTE MAXILLARY SINUSITIS, RECURRENCE NOT SPECIFIED: ICD-10-CM

## 2018-01-26 DIAGNOSIS — J47.1 BRONCHIECTASIS WITH ACUTE EXACERBATION (HCC): Primary | ICD-10-CM

## 2018-01-26 DIAGNOSIS — R07.81 PLEURITIC PAIN: ICD-10-CM

## 2018-01-26 RX ORDER — AMOXICILLIN AND CLAVULANATE POTASSIUM 875; 125 MG/1; MG/1
1 TABLET, FILM COATED ORAL EVERY 12 HOURS
Qty: 14 TAB | Refills: 0 | Status: SHIPPED | OUTPATIENT
Start: 2018-01-26 | End: 2018-05-10 | Stop reason: ALTCHOICE

## 2018-01-26 NOTE — PROGRESS NOTES
MICHAEL Carson is a 74 y.o. female with relevant past medical history of breast cancer on chemotherapy (tamoxifen), ADD, OA, GERD, HLD, MD2, VZV, remote h/o spontaneous pneumothorax x 3, amebic colitis and bronchiectasis- following with Dr. Ashish Madera (pulmonology), per patient with h/o recurrent lung infections, last around 4/2017, allergic to FQ (avelox, levofloxacin, macrobid) who presents today for evaluation of left sided chest discomfort, described as sharp, band-like on her left lower costal area, associated with heaviness sensation, mild shortness of breath, worse with movement and deep inspiration and fatigue,  that she attributes to classic presentation of exacerbation of bronichiectasis. She also complaints of headache on her left maxillary area, radiating to her left temple and left frontal sinus area, she feels her ear is muffled on the left side. Denies any fever, chills, diaphoreiss, sore throat, each ache, but has noted some nasal congestion and occasionally mild epistaxis. She filled out a prescription of azithromycin that she completed last Friday (1 week ago) given to her by her pulmonologist, the patient explains, in case of exacerbations. She says she felt partially better but not all the way. She is concerned she might have pneumonia and may need a stronger antibiotic. She uses advair and albuterol inhaler PRN, but has not been using lately. She denies any significant cough, but occasionally has cough with thick green sputum, that does not seem to be worsening lately. She denies any wheezing, chest pain, palpitations, dizziness, LOC. Her HR is noted to be borderline elevated, she tells me this is her baseline. Of note, she tells me she might want to come off metformin because it makes her have loose stools and she is not sure if that is contributting to her feeling tired and sometimes dizzy.  She is encouraged to discuss this with PCP in case the symptoms persist after treating her current episode. Review of Systems   Constitutional: Positive for malaise/fatigue. Negative for chills, diaphoresis, fever and weight loss. HENT: Positive for congestion, nosebleeds and sinus pain. Negative for ear discharge, ear pain, hearing loss, sore throat and tinnitus. Eyes: Negative for blurred vision, discharge and redness. Respiratory: Positive for cough, sputum production and shortness of breath. Negative for hemoptysis, wheezing and stridor. Cardiovascular: Negative for chest pain, palpitations and leg swelling. Gastrointestinal: Negative for abdominal pain, constipation, diarrhea, nausea and vomiting. Genitourinary: Negative for dysuria and frequency. Musculoskeletal: Negative for myalgias. Skin: Negative for itching and rash. Neurological: Positive for headaches. Negative for dizziness and weakness. As above included in HPI. Otherwise 11 point review of systems negative including constitutional, skin, HENT, eyes, respiratory, cardiovascular, gastrointestinal, genitourinary, musculoskeletal, endocrine, hematologic, allergy, and neurologic. Past Medical History  Past Medical History:   Diagnosis Date    ADD (attention deficit disorder) 2014    [facility]    Amebic colitis 1970s    Arthritis     s/p cortisone left knee    Bronchiectasis     Dr. Hussain Youngblood; RLL    Bronchiectasis [facility])     FHx: breast cancer     Fibrocystic breast disease     Dr. Tarik Valladares GERD (gastroesophageal reflux disease)     Dr. Avila Ballesteros s/p dilation 2/12    H/O pulmonary function tests 2011    ratio 70, FEV1 81 no change postbd, TLC 93, RV 94, DLCO 81    History of right breast cancer 02/2016    Dr Aleta Dawson; RIGHT partial mastectomy, adjuvant XRT    Hyperlipidemia     Insomnia     Left rotator cuff tear 2000s    conservative therapy    Osteopenia     DEXA t-score -1.2 spine, -0.2 hip (4/08);  spine -1.2, hip 0.1 (10/10);  -1.8 spine, -0.3 FRAX 6.3/0.2 (7/13);  [facility] -1.7 spine, 0.1 hip (4/17)    Spontaneous pneumothorax 1970s    x3    Type II or unspecified type diabetes mellitus      on basis of elev a1c 9/13    Zoster right S1 3/12     Past Surgical History:   Procedure Laterality Date    BREAST SURGERY PROCEDURE UNLISTED  2/11    Left nipple duct exploration and excisional biopsy    CARDIAC SURG PROCEDURE UNLIST  4/14    Thallim negative, ef 75%    HX BREAST BIOPSY  4/12    Right Breast biopsy w/needle    HX BREAST LUMPECTOMY Right 02/14/2017    HX COLONOSCOPY      Dr. Torey Harding negative 2005; diverticulosis 12/13 Dr Eileen Lovell; Dr Torey Harding 11/27/17 neg    HX HYSTERECTOMY  1993    fibroids/ovarian cysts - Dr. Shae Castanon ORTHOPAEDIC  03/2017    Dr Felicita Mederos; left radial fx, left scaphoid fx after fall        Family History  Family History   Problem Relation Age of Onset    Breast Cancer Mother     Ovarian Cancer Mother     Diabetes Father     Stroke Father     Diabetes Sister     Breast Cancer Maternal Grandmother     Breast Cancer Paternal Grandmother     Breast Cancer Paternal Aunt        Social History  She  reports that she quit smoking about 44 years ago. She has a 5.00 pack-year smoking history.  She has never used smokeless tobacco.   History   Alcohol Use No       Immunization History  Immunization History   Administered Date(s) Administered    Influenza High Dose Vaccine PF 10/21/2017    Influenza Vaccine 10/23/2014, 10/01/2016    Influenza Vaccine (Quad) PF 10/30/2015    Influenza Vaccine PF 10/02/2013    Influenza Vaccine Split 01/16/2012, 11/28/2012    Influenza Vaccine Whole 09/22/2010    Pneumococcal Conjugate (PCV-13) 10/25/2016    TB Skin Test (PPD) 08/01/2009    Tdap 02/12/2017    ZZZ-RETIRED (DO NOT USE) Pneumococcal Vaccine (Unspecified Type) 01/01/2008       Allergies  Allergies   Allergen Reactions    Actos [Pioglitazone] Other (comments)     Felt bad, weight gain    Avelox [Moxifloxacin] Other (comments)     Felt jittery    Crestor [Rosuvastatin] Myalgia    Evista [Raloxifene] Other (comments)     Severe flashes    Lipitor [Atorvastatin] Other (comments)     Leg cramps and muscle aches    Macrobid [Nitrofurantoin Monohyd/M-Cryst] Hives    Neuromuscular Blockers, Steroidal Other (comments)     \"patient feels crazy\"       Medications  Current Outpatient Prescriptions   Medication Sig    amoxicillin-clavulanate (AUGMENTIN) 875-125 mg per tablet Take 1 Tab by mouth every twelve (12) hours.  metFORMIN ER (GLUCOPHAGE XR) 500 mg tablet Take 2 Tabs by mouth Daily (before dinner).  temazepam (RESTORIL) 30 mg capsule Take 1 Cap by mouth nightly as needed. Max Daily Amount: 30 mg.  pravastatin (PRAVACHOL) 40 mg tablet TAKE 1 TAB BY MOUTH BY MOUTH NIGHTLY.  ADVAIR DISKUS 250-50 mcg/dose diskus inhaler TAKE 1 PUFF BY INHALATION TWO (2) TIMES A DAY.  tamoxifen (NOLVADEX) 10 mg tablet Take  by mouth daily.  albuterol (VENTOLIN HFA) 90 mcg/actuation inhaler Take 2 Puffs by inhalation every six (6) hours as needed for Wheezing. Indications: BRONCHOSPASTIC PULMONARY DISEASE    DEXILANT 60 mg CpDB Take 60 mg by mouth daily.  b complex vitamins (B COMPLEX 1) tablet Take 1 Tab by mouth daily.  Cholecalciferol, Vitamin D3, (VITAMIN D3) 1,000 unit cap Take  by mouth.  IBUPROFEN (ADVIL PO) Take  by mouth as needed. No current facility-administered medications for this visit. Visit Vitals    /62 (BP 1 Location: Right arm, BP Patient Position: Sitting)    Pulse (!) 106    Temp 98 °F (36.7 °C) (Oral)    Resp 19    Ht 5' 3\" (1.6 m)    Wt 136 lb 6.4 oz (61.9 kg)    SpO2 99%    BMI 24.16 kg/m2     Body mass index is 24.16 kg/(m^2). Physical Exam   Constitutional: She is oriented to person, place, and time and well-developed, well-nourished, and in no distress. No distress. HENT:   Head: Normocephalic and atraumatic.    Right Ear: External ear normal.   Left Ear: External ear normal.   Mouth/Throat: Oropharynx is clear and moist. No oropharyngeal exudate. Nasal mucosa is mildly congested  Left maxillary sinus tenderness   Neck: Normal range of motion. Neck supple. Cardiovascular: Regular rhythm and normal heart sounds. Regular tachycardia   Pulmonary/Chest: Effort normal. No respiratory distress. She has no wheezes. She has no rales. She exhibits no tenderness. Decreased ventilation on left lower lung field   Musculoskeletal: She exhibits no edema. Lymphadenopathy:     She has no cervical adenopathy. Neurological: She is alert and oriented to person, place, and time. Skin: Skin is warm. She is not diaphoretic. Psychiatric: Mood, affect and judgment normal.   Nursing note and vitals reviewed. REVIEW OF DATA    Labs  No visits with results within 1 Month(s) from this visit. Latest known visit with results is:    Orders Only on 12/26/2017   Component Date Value Ref Range Status    Triglyceride 12/26/2017 197* 40 - 149 mg/dL Final    HDL Cholesterol 12/26/2017 60* 40 - 59 mg/dL Final    Cholesterol, total 12/26/2017 154  110 - 200 mg/dL Final    LDL, calculated 12/26/2017 55  50 - 99 mg/dL Final    VLDL, calculated 12/26/2017 39* 8 - 30 mg/dL Final    Glucose 12/26/2017 104* 70 - 99 mg/dL Final    BUN 12/26/2017 19  6 - 22 mg/dL Final    Creatinine 12/26/2017 0.6* 0.8 - 1.4 mg/dL Final    Sodium 12/26/2017 143  133 - 145 mmol/L Final    Potassium 12/26/2017 4.4  3.5 - 5.5 mmol/L Final    Chloride 12/26/2017 104  98 - 110 mmol/L Final    CO2 12/26/2017 24  20 - 32 mmol/L Final    AST (SGOT) 12/26/2017 14  10 - 37 U/L Final    ALT (SGPT) 12/26/2017 17  5 - 40 U/L Final    Alk.  phosphatase 12/26/2017 67  40 - 120 U/L Final    Bilirubin, total 12/26/2017 0.1* 0.2 - 1.2 mg/dL Final    Calcium 12/26/2017 8.9  8.4 - 10.5 mg/dL Final    Protein, total 12/26/2017 6.2  6.2 - 8.1 g/dL Final    Albumin 12/26/2017 4.0  3.5 - 5.0 g/dL Final    A-G Ratio 12/26/2017 1.8  1.1 - 2.6 ratio Final    Globulin 12/26/2017 2.2  2.0 - 4.0 g/dL Final    Anion gap 12/26/2017 15.0  mmol/L Final    GFRAA 12/26/2017 >60.0  >60.0 Final    GFRNA 12/26/2017 >60.0  >60.0 Final    Hemoglobin A1c 12/26/2017 6.8* 4.8 - 5.9 % Final    AVG GLU 12/26/2017 149* 91 - 123 mg/dL Final       Imaging  Relevant imaging reviewed as indicated  Last CXR found on file is from 2014, unremarkable per report     DIAGNOSIS AND PLAN  Patient Active Problem List   Diagnosis Code    Bronchiectasis Dr. Tobin Robertson J47.9    Osteopenia  M85.80    Hyperlipidemia E78.5    Gastroesophageal reflux disease without esophagitis K21.9    Malignant neoplasm of right female breast Dr Collin Morris C50.911    Uncontrolled type 2 diabetes mellitus E11.65     1. Bronchiectasis with acute exacerbation (HCC)  Unclear etiology, unable to find notes from Dr. Vesna Cruz at this time to check work up so far. I will treat presumed exacerbation with antibiotic as prescribed  - amoxicillin-clavulanate (AUGMENTIN) 875-125 mg per tablet; Take 1 Tab by mouth every twelve (12) hours. Dispense: 14 Tab; Refill: 0  - High resolution CT ordered for evaluation to assess status of bronchiectasis, clarify diagnosis, associated infiltrates of effusion given pleuritic pain and decreased auscultation on left lower pulmonary lobe. Depending on findings (persistent brochiectasis) consider sputum culture (bacterial, AFB and fungal) if not done previously, to be characterize presentation and assess need for further work up/treatment. 2. Pleuritic pain  See above    3. Acute maxillary sinusitis, recurrence not specified  Empiric therapy with antibiotic as prescribed  - amoxicillin-clavulanate (AUGMENTIN) 875-125 mg per tablet; Take 1 Tab by mouth every twelve (12) hours. Dispense: 14 Tab; Refill: 0       Follow-up Disposition:  Return if symptoms worsen or fail to improve. Felipa Mclean MD

## 2018-01-26 NOTE — MR AVS SNAPSHOT
Formerly [facility], [facility] 
 
 
 [address], [address] [facility] 
[phone] Patient: Hayley Marino MRN: [id number] Visit Information Date & Time Provider Department Dept. Phone Encounter #  
 1/26/2018  9:30 AM Megan Elam MD [facility] [phone] [id number] Your Appointments 5/4/2018  8:45 AM  
LAB with C NURSE VISIT [facility] ([address] [place] [address]) Appt Note: labs [address], [address] [place]  
  
   
 [address], [address] [place] [address] [place] [id number]  
  
    
 5/10/2018  2:30 PM  
Office Visit with Pedro Pulido MD  
[facility] [address]) Appt Note: ov [address]mos. rd  
 [address], [address] [place] [address] [place]  
  
   
 [address], [facility] Upcoming Health Maintenance Date Due Pneumococcal 65+ High/Highest Risk (2 of 2 - PPSV23) 9/28/2017 MEDICARE YEARLY EXAM 9/28/2017 EYE EXAM RETINAL OR DILATED Q1 3/30/2018 HEMOGLOBIN A1C Q6M 6/26/2018 MICROALBUMIN Q1 8/31/2018 FOOT EXAM Q1 9/7/2018 BREAST CANCER SCRN MAMMOGRAM 12/22/2018 LIPID PANEL Q1 12/26/2018 GLAUCOMA SCREENING Q2Y 3/30/2019 DTaP/Tdap/Td series (2 - Td) 5/3/2027 COLONOSCOPY 11/27/2027 Allergies as of 1/26/2018  Review Complete On: 1/26/2018 By: Tawny Brennan LPN Severity Noted Reaction Type Reactions Actos [Pioglitazone]  01/09/2018    Other (comments) Felt bad, weight gain Avelox [Moxifloxacin]  01/16/2012    Other (comments) Felt jittery Crestor [Rosuvastatin]  10/23/2014    Myalgia Evista [Raloxifene]    Other (comments) Severe flashes Lipitor [Atorvastatin]  11/07/2013   Side Effect Other (comments) Leg cramps and muscle aches Macrobid [Nitrofurantoin Monohyd/m-cryst]    [facility] Neuromuscular Blockers, Steroidal    Other (comments) \"patient feels crazy\" Current Immunizations  Reviewed on 1/9/2018 Name Date Influenza High Dose Vaccine PF 10/21/2017 12:00 AM  
 Influenza Vaccine 10/1/2016, 10/23/2014 Influenza Vaccine (Quad) PF 10/30/2015 Influenza Vaccine PF 10/2/2013 Influenza Vaccine Split 11/28/2012  9:38 AM, 1/16/2012  3:35 PM  
 Influenza Vaccine Whole 9/22/2010 Pneumococcal Conjugate (PCV-13) 10/25/2016 TB Skin Test (PPD) 8/1/2009 Tdap 2/12/2017 12:00 AM  
 ZZZ-RETIRED (DO NOT USE) Pneumococcal Vaccine (Unspecified Type) 1/1/2008 Not reviewed this visit You Were Diagnosed With   
  
 Codes Comments Bronchiectasis with acute exacerbation ([facility]    -  Primary ICD-10-CM: J47.1 ICD-9-CM: 494.1 Vitals BP Pulse Temp Resp Height(growth percentile) Weight(growth percentile) 122/62 (BP 1 Location: Right arm, BP Patient Position: Sitting) (!) 106 98 °F (36.7 °C) (Oral) 19 5' 3\" (1.6 m) 136 lb 6.4 oz (61.9 kg) SpO2 BMI OB Status Smoking Status 99% 24.16 kg/m2 Postmenopausal Former Smoker Vitals History BMI and BSA Data Body Mass Index Body Surface Area  
 24.16 kg/m 2 1.66 m 2 Preferred Pharmacy Pharmacy Name Phone [id number], [address] [facility] [place] [facility] [phone] Your Updated Medication List  
  
   
This list is accurate as of: 1/26/18 10:45 AM.  Always use your most recent med list.  
  
  
  
  
 Kathe Mcmahon 250-50 mcg/dose diskus inhaler Generic drug:  fluticasone-salmeterol TAKE 1 PUFF BY INHALATION TWO (2) TIMES A DAY. ADVIL PO Take  by mouth as needed. albuterol 90 mcg/actuation inhaler Commonly known as:  VENTOLIN HFA Take 2 Puffs by inhalation every six (6) hours as needed for Wheezing. Indications: BRONCHOSPASTIC PULMONARY DISEASE B COMPLEX 1 tablet Generic drug:  b complex vitamins Take 1 Tab by mouth daily. DEXILANT 60 mg Cpdb Generic drug:  Dexlansoprazole Take 60 mg by mouth daily. metFORMIN  mg tablet Commonly known as:  GLUCOPHAGE XR Take 2 Tabs by mouth Daily (before dinner). pravastatin 40 mg tablet Commonly known as:  PRAVACHOL  
TAKE 1 TAB BY MOUTH BY MOUTH NIGHTLY.  
  
 tamoxifen 10 mg tablet Commonly known as:  NOLVADEX Take  by mouth daily. temazepam 30 mg capsule Commonly known as:  RESTORIL Take 1 Cap by mouth nightly as needed. Max Daily Amount: 30 mg. VITAMIN D3 1,000 unit Cap Generic drug:  cholecalciferol Take  by mouth. Introducing hospitals & HEALTH SERVICES! Dear [employer]: Thank you for requesting a [employer] account. Our records indicate that you already have an active [employer] account. You can access your account anytime at [email or web address] Did you know that you can access your hospital and ER discharge instructions at any time in [employer]? You can also review all of your test results from your hospital stay or ER visit. Additional Information If you have questions, please visit the Frequently Asked Questions section of the [employer] website at [email or web address] Remember, [employer] is NOT to be used for urgent needs. For medical emergencies, dial 911. Now available from your iPhone and Android! Please provide this summary of care documentation to your next provider. Your primary care clinician is listed as Lillette Pod. If you have any questions after today's visit, please call [phone].

## 2018-02-15 ENCOUNTER — HOSPITAL ENCOUNTER (OUTPATIENT)
Dept: CT IMAGING | Age: 68
Discharge: HOME | End: 2018-02-15
Attending: INTERNAL MEDICINE
Payer: COMMERCIAL

## 2018-02-15 DIAGNOSIS — J47.1 BRONCHIECTASIS WITH ACUTE EXACERBATION (HCC): ICD-10-CM

## 2018-02-15 DIAGNOSIS — R07.81 PLEURITIC PAIN: ICD-10-CM

## 2018-02-15 DIAGNOSIS — J01.00 ACUTE MAXILLARY SINUSITIS, RECURRENCE NOT SPECIFIED: ICD-10-CM

## 2018-02-15 LAB — CREAT UR-MCNC: 0.5 MG/DL (ref 0.6–1.3)

## 2018-02-15 PROCEDURE — 82565 ASSAY OF CREATININE: CPT

## 2018-02-15 PROCEDURE — 74011636320 HC RX REV CODE- 636/320: Performed by: INTERNAL MEDICINE

## 2018-02-15 PROCEDURE — 71260 CT THORAX DX C+: CPT

## 2018-02-15 RX ADMIN — IOPAMIDOL 75 ML: 612 INJECTION, SOLUTION INTRAVENOUS at 08:18

## 2018-02-20 ENCOUNTER — TELEPHONE (OUTPATIENT)
Dept: INTERNAL MEDICINE CLINIC | Age: 68
End: 2018-02-20

## 2018-02-23 DIAGNOSIS — G47.00 INSOMNIA, UNSPECIFIED TYPE: ICD-10-CM

## 2018-02-23 RX ORDER — TEMAZEPAM 30 MG/1
30 CAPSULE ORAL
Qty: 30 CAP | Refills: 1 | OUTPATIENT
Start: 2018-02-23 | End: 2018-04-25 | Stop reason: SDUPTHER

## 2018-02-23 NOTE — TELEPHONE ENCOUNTER
PHONE IN [facility] reports the last fill date for Restoril as 01/25/2018 for a 30 d/s. There appears to be no inconsistencies in regards to the prescribing of this medication. Last Visit: 01/09/2018 with MD Julia Delgadillo    Next Appointment: 05/10/2018 with MD Julia Delgadillo   Previous Refill Encounters: 12/26/2017 per MD Julia Delgadillo #30 with 1 refill     Requested Prescriptions     Pending Prescriptions Disp Refills    temazepam (RESTORIL) 30 mg capsule 30 Cap 1     Sig: Take 1 Cap by mouth nightly as needed. Max Daily Amount: 30 mg.

## 2018-02-23 NOTE — TELEPHONE ENCOUNTER
I called the patient with test results. I left a voicemessage. All findings from chest CT done 1/26/18 stable from previous images as reported. No concern for malignancy. Inflammatory changes improved. No further work up at this time. Advised to call back in case of any questions or come in for evaluation in case of worsening symptoms.    Thanks,

## 2018-02-23 NOTE — TELEPHONE ENCOUNTER
Rolanda Brewer MD   You 1 hour ago (1:39 PM)               I called the patient with test results. I left a voicemessage. All findings from chest CT done 1/26/18 stable from previous images as reported. No concern for malignancy. Inflammatory changes improved. No further work up at this time. Advised to call back in case of any questions or come in for evaluation in case of worsening symptoms.    Thanks,

## 2018-02-25 DIAGNOSIS — E78.5 HYPERLIPIDEMIA, UNSPECIFIED HYPERLIPIDEMIA TYPE: ICD-10-CM

## 2018-02-25 RX ORDER — PRAVASTATIN SODIUM 40 MG/1
TABLET ORAL
Qty: 90 TAB | Refills: 0 | Status: SHIPPED | OUTPATIENT
Start: 2018-02-25 | End: 2018-10-17 | Stop reason: SDUPTHER

## 2018-04-25 DIAGNOSIS — G47.00 INSOMNIA, UNSPECIFIED TYPE: ICD-10-CM

## 2018-04-25 RX ORDER — TEMAZEPAM 30 MG/1
30 CAPSULE ORAL
Qty: 30 CAP | Refills: 1 | OUTPATIENT
Start: 2018-04-25 | End: 2018-06-29 | Stop reason: SDUPTHER

## 2018-04-25 NOTE — TELEPHONE ENCOUNTER
PHONE IN [facility] reports the last fill date for Restoril as 03/26/2018 for a 30 d/s. There appears to be no inconsistencies in regards to the prescribing of this medication. Last Visit: 01/26/2018 with MD Gris Mayfield   Next Appointment: 05/10/2018 with MD Selena Radford   Previous Refill Encounters: 02/23/2018 per MD Selena Radford #30 with 1 refill     Requested Prescriptions     Pending Prescriptions Disp Refills    temazepam (RESTORIL) 30 mg capsule 30 Cap 1     Sig: Take 1 Cap by mouth nightly as needed. Max Daily Amount: 30 mg.

## 2018-05-04 ENCOUNTER — HOSPITAL ENCOUNTER (OUTPATIENT)
Dept: LAB | Age: 68
Discharge: HOME | End: 2018-05-04

## 2018-05-04 LAB
AVG GLU, 10930: 153 MG/DL (ref 91–123)
CREATININE, URINE: 172 MG/DL
ERYTHROCYTE [DISTWIDTH] IN BLOOD BY AUTOMATED COUNT: 15.1 % (ref 10–15.5)
HBA1C MFR BLD HPLC: 7 % (ref 4.8–5.9)
HCT VFR BLD AUTO: 36.1 % (ref 35.1–48.3)
HGB BLD-MCNC: 11.3 G/DL (ref 11.7–16.1)
MCH RBC QN AUTO: 27 PG (ref 26–34)
MCHC RBC AUTO-ENTMCNC: 31 G/DL (ref 31–36)
MCV RBC AUTO: 85 FL (ref 80–95)
MICROALB/CREAT RATIO, 140286: NORMAL MCG/MG OF CREATININE (ref 0–30)
MICROALBUMIN,URINE RANDOM 140054: <12 UG/ML (ref 0.1–17)
PLATELET # BLD AUTO: 281 K/UL (ref 140–440)
PMV BLD AUTO: 9.9 FL (ref 9–13)
RBC # BLD AUTO: 4.25 M/UL (ref 3.8–5.2)
SENTARA SPECIMEN COL,SENBCF: NORMAL
WBC # BLD AUTO: 5.7 K/UL (ref 4–11)

## 2018-05-04 PROCEDURE — 99001 SPECIMEN HANDLING PT-LAB: CPT | Performed by: INTERNAL MEDICINE

## 2018-05-06 NOTE — PROGRESS NOTES
74 y.o. WHITE OR  female who presents for f/u    She continues to tamox for RIGHT breast ca being followed by Dr Lynda Guadarrama    No cardiovascular complaints. She is walking up to 10K steps daily     Denies polyuria, polydipsia, nocturia, vision change. We tried changing to janumet at the last visit but she had dizziness(?) with that. Just can't tolerate the metformin due to gi sfx. Willing to try [facility] by itself. We discussed sglt2 but leery of candida and declining injectables at this time. Weight is about the same. We discussed intermittent fasting at length but she has h/o symptomatic hypoglycemia 'all her life' so not enthusiastic about that    [address]: never as not medicare b    Past Medical History:   Diagnosis Date    ADD (attention deficit disorder) 2014    [facility] psych    Amebic colitis 1970s    Arthritis     s/p cortisone left knee    Bronchiectasis     Dr. Rob Husain; RLL    Bronchiectasis ([facility] [address]     FHx: breast cancer     Fibrocystic breast disease     Dr. Yvette Bhakta GERD (gastroesophageal reflux disease)     Dr. Luh Dahl s/p dilation 2/12    H/O pulmonary function tests 2011    ratio 70, FEV1 81 no change postbd, TLC 93, RV 94, DLCO 81    History of right breast cancer 02/2016    Dr Lynda Guadarrama; RIGHT partial mastectomy, adjuvant XRT    Hyperlipidemia     Insomnia     Left rotator cuff tear 2000s    conservative therapy    Osteopenia     DEXA t-score -1.2 spine, -0.2 hip (4/08);  spine -1.2, hip 0.1 (10/10);  -1.8 spine, -0.3 FRAX 6.3/0.2 (7/13);  [facility] -1.7 spine, 0.1 hip (4/17)    Spontaneous pneumothorax 1970s    x3    Type II or unspecified type diabetes mellitus      on basis of elev a1c 9/13    Zoster right S1 3/12     Past Surgical History:   Procedure Laterality Date    BREAST SURGERY PROCEDURE UNLISTED  2/11    Left nipple duct exploration and excisional biopsy    CARDIAC SURG PROCEDURE UNLIST  4/14    Thallim negative, ef 75%    HX BREAST BIOPSY  4/12 Right Breast biopsy w/needle    HX BREAST LUMPECTOMY Right 02/14/2017    HX COLONOSCOPY      Dr. Lorena Rebollar negative 2005; diverticulosis 12/13 Dr Kathe Patton; Dr Lorena Rebollar 11/27/17 neg    HX HYSTERECTOMY  1993    fibroids/ovarian cysts - Dr. Monica Lara ORTHOPAEDIC  03/2017    Dr Terri Shrestha; left radial fx, left scaphoid fx after fall     Social History     Social History    Marital status:      Spouse name: N/A    Number of children: 2    Years of education: N/A     Occupational History   [employer]     Social History Main Topics    Smoking status: Former Smoker     Packs/day: 1.00     Years: 5.00     Quit date: 8/1/1973    Smokeless tobacco: Never Used    Alcohol use No    Drug use: No    Sexual activity: Not on file     Other Topics Concern    Not on file     Social History Narrative     Current Outpatient Prescriptions   Medication Sig    SITagliptin (JANUVIA) 100 mg tablet Take 1 Tab by mouth daily.  temazepam (RESTORIL) 30 mg capsule Take 1 Cap by mouth nightly as needed. Max Daily Amount: 30 mg.  pravastatin (PRAVACHOL) 40 mg tablet TAKE 1 TAB BY MOUTH BY MOUTH NIGHTLY.  ADVAIR DISKUS 250-50 mcg/dose diskus inhaler TAKE 1 PUFF BY INHALATION TWO (2) TIMES A DAY.  tamoxifen (NOLVADEX) 10 mg tablet Take  by mouth daily.  albuterol (VENTOLIN HFA) 90 mcg/actuation inhaler Take 2 Puffs by inhalation every six (6) hours as needed for Wheezing. Indications: BRONCHOSPASTIC PULMONARY DISEASE    DEXILANT 60 mg CpDB Take 60 mg by mouth daily.  b complex vitamins (B COMPLEX 1) tablet Take 1 Tab by mouth daily.  Cholecalciferol, Vitamin D3, (VITAMIN D3) 1,000 unit cap Take  by mouth.  IBUPROFEN (ADVIL PO) Take  by mouth as needed. No current facility-administered medications for this visit.       Allergies   Allergen Reactions    Actos [Pioglitazone] Other (comments)     Felt bad, weight gain    Avelox [Moxifloxacin] Other (comments)     Felt jittery    Crestor [Rosuvastatin] Myalgia    Evista [Raloxifene] Other (comments)     Severe flashes    Lipitor [Atorvastatin] Other (comments)     Leg cramps and muscle aches    Macrobid [Nitrofurantoin Monohyd/M-Cryst] Hives    Neuromuscular Blockers, Steroidal Other (comments)     \"patient feels crazy\"     REVIEW OF SYSTEMS: gyn 2012, mammo 12/16, DEXA 4/17, colo 12/13 Dr Ledy Cedillo, Dr Hang Marie  Ophtho  no vision change or eye pain  Oral  no mouth pain, tongue or tooth problems  Ears  no hearing loss, ear pain, fullness, no swallowing problems  Cardiac  no CP, PND, orthopnea, edema, palpitations or syncope  Chest  no breast masses  Resp  no wheezing, chronic coughing, dyspnea  GI  no heartburn, nausea, vomiting, change in bowel habits, bleeding, hemorrhoids  Urinary  no dysuria, hematuria, flank pain, urgency, frequency  Constitutional  no wt loss, night sweats, unexplained fevers  Neuro  no focal weakness, numbness, paresthesias, incoordination, ataxia, involuntary movements  Endo - no polyuria, polydipsia, nocturia, hot flashes    Visit Vitals    /78 (BP 1 Location: Left arm, BP Patient Position: Sitting)    Pulse 100    Temp 98 °F (36.7 °C) (Oral)    Resp 14    Ht 5' 3\" (1.6 m)    Wt 137 lb (62.1 kg)    SpO2 98%    BMI 24.27 kg/m2   A&O x3  Affect is appropriate. Mood stable  No apparent distress  Anicteric, no JVD, adenopathy or thyromegaly. No carotid bruits or radiated murmur  Lungs clear to auscultation, no wheezes or rales  Heart showed regular rate and rhythm. No murmur, rubs, gallops  Abdomen soft nontender, no hepatosplenomegaly or masses.    Ext without c/c/e    LABS  From 9/10 showed   gluc 106, cr 0.70, gfr>60, alt 42,                   chol 238, tg 154, hdl 54, ldl-c 153, wbc 6.5, hb 13.1, plt 258, ua neg  From 2/11 showed   gluc 95,   cr 0.90                                                                                                   wbc 7.0, hb 14.1, plt 308  From 3/12 showed   gluc 103, cr 0.90              alt 43,                                                                           wbc 6.2, hb 13.7, plt 285  From 3/13 showed   gluc 130, cr 0.60,             alt 22,                    chol 244, tg 180, hdl 57, ldl-c 151,                                          ua neg, tsh 1.79  From 9/13 showed   gluc 119, cr 0.70, gfr>60,            hba1c 6.6, chol 228, tg 145, hdl 62, ldl-c 137, 2hr   From 2/14 showed   gluc 95,   cr 0.60, gfr>61, alt 20, hba1c 5.7,                    umar 2.8  From 4/14 showed   gluc 144, cr 0.98, gfr>60, alt 31,          wbc 7.6, hb 13.4, plt 317, ck/trop neg  From 10/14 showed gluc 98,   cr 0.60, gfr>60, alt 20,          chol 163, tg 96,   hdl 64, ldl-c 80  From 4/15 showed   gluc 96,   cr 0.70, gfr>60, alt 19, hba1c 6.1, chol 207, tg 126, hdl 68, ldl-c 114  From 10/15 showed        hba1c 6.3, chol 188, tg 118, hdl 66, ldl-c 98,   wbc 5.3, hb 13.3, plt 287, umar neg  From 4/16 showed   gluc 98,   cr 0.60, gfr>60, alt 19, hba1c 6.3, chol 170, tg 141, hdl 57, ldl-c 85  From 10/16 showed gluc 137, cr 0.50, gfr>60, alt 17, hba1c 6.2, chol 176, tg 302, hdl 54, ldl-c 62,   wbc 8.9, hb 12.3, plt 393, umar neg  From 1/17 showed   gluc 114, cr 0.60, gfr>60,           wbc 6.1, hb 12.9, plt 356  From 4/17 showed        hba1c 6.8, chol 223, tg 240, hdl 53, ldl-c 122  From 9/17 showed   gluc 109, cr 0.70, gfr>60, alt 16, hba1c 6.7,                  umar neg  From 1/18 showed   gluc 104, cr 0.60, gfr>60, alt 14, hba1c 6.8, chol 154, tg 197, hdl 60, ldl-c 55    Results for orders placed or performed in visit on 05/04/18   CBC W/O DIFF   Result Value Ref Range    WBC 5.7 4.0 - 11.0 K/uL    RBC 4.25 3.80 - 5.20 M/uL    HGB 11.3 (L) 11.7 - 16.1 g/dL    HCT 36.1 35.1 - 48.3 %    MCV 85 80 - 95 fL    MCH 27 26 - 34 pg    MCHC 31 31 - 36 g/dL    RDW 15.1 10.0 - 15.5 %    PLATELET 361 187 - 409 K/uL    MPV 9.9 9.0 - 13.0 fL   HEMOGLOBIN A1C W/O EAG   Result Value Ref Range    Hemoglobin A1c 7.0 (H) 4.8 - 5.9 %    AVG  (H) 91 - 123 mg/dL   MICROALBUMIN, UR, RAND   Result Value Ref Range    Creatinine, urine 172 mg/dL    Microalbumin, urine <12.0 0.1 - 17.0 ug/mL    Microalb/Creat ratio (ug/mg creat.)  0.0 - 30.0 mcg/mg of Creatinine     Patient Active Problem List   Diagnosis Code    Bronchiectasis Dr. Lacho Moore J47.9    Osteopenia  M85.80    Hyperlipidemia E78.5    Gastroesophageal reflux disease without esophagitis K21.9    Uncontrolled type 2 diabetes mellitus E11.65    History of breast cancer Z85.3     Assessment and plan:  1. Pulm. F/U Dr Tio Espinosa and continue current  2. GERD. Continue ppi and avoidance measures  3. Osteopenia. Ca/d, wt bearing exercises  4. DM. Trial of januvia, call if w sfx  5. Lipids. Continue current regimen. 6  Breast ca. Per Dr Sukhjinder Boyer  7. Immunizations. Second pvx-23 in future       RTC 9/18    Above conditions discussed at length and patient vocalized understanding.   All questions answered to patient satifaction

## 2018-05-10 ENCOUNTER — OFFICE VISIT (OUTPATIENT)
Dept: INTERNAL MEDICINE CLINIC | Age: 68
End: 2018-05-10

## 2018-05-10 VITALS
HEIGHT: 63 IN | TEMPERATURE: 98 F | BODY MASS INDEX: 24.27 KG/M2 | DIASTOLIC BLOOD PRESSURE: 78 MMHG | RESPIRATION RATE: 14 BRPM | SYSTOLIC BLOOD PRESSURE: 130 MMHG | WEIGHT: 137 LBS | HEART RATE: 100 BPM | OXYGEN SATURATION: 98 %

## 2018-05-10 DIAGNOSIS — Z85.3 HISTORY OF BREAST CANCER: ICD-10-CM

## 2018-05-10 DIAGNOSIS — E78.5 HYPERLIPIDEMIA, UNSPECIFIED HYPERLIPIDEMIA TYPE: ICD-10-CM

## 2018-05-10 DIAGNOSIS — K21.9 GASTROESOPHAGEAL REFLUX DISEASE WITHOUT ESOPHAGITIS: ICD-10-CM

## 2018-05-10 DIAGNOSIS — E53.8 B12 DEFICIENCY: ICD-10-CM

## 2018-05-10 PROBLEM — C50.911 MALIGNANT NEOPLASM OF RIGHT FEMALE BREAST (HCC): Status: RESOLVED | Noted: 2017-01-25 | Resolved: 2018-05-10

## 2018-05-10 NOTE — PROGRESS NOTES
1. Have you been to the ER, urgent care clinic or hospitalized since your last visit? NO.     2. Have you seen or consulted any other health care providers outside of the [address] [place] [address] since your last visit (Include any pap smears or colon screening)? NO      Do you have an Advanced Directive? NO    Would you like information on Advanced Directives?  NO      Chief Complaint   Patient presents with    Diabetes     4 month follow up with labs

## 2018-05-10 NOTE — MR AVS SNAPSHOT
[address] [facility] [address] 
 
 
 [address], [address] 
[phone] Patient: Mindi Burrell MRN: [id number]:9/50/[id number] Visit Information Date & Time Provider Department Dept. Phone Encounter #  
 5/10/2018  2:30 PM Merlinda Posner, MD [facility] [address] [place] [address] [phone] Your Appointments 9/10/2018  8:00 AM  
Office Visit with Merlinda Posner, MD  
[facility] [address] [place] [address] [facility]) Appt Note: 4 mo f/u  
 [address]  
  
   
 [address] [facility] Upcoming Health Maintenance Date Due Pneumococcal 65+ High/Highest Risk (2 of 2 - PPSV23) 9/28/2017 EYE EXAM RETINAL OR DILATED Q1 3/30/2018 Influenza Age 5 to Adult 8/1/2018 FOOT EXAM Q1 9/7/2018 HEMOGLOBIN A1C Q6M 11/4/2018 BREAST CANCER SCRN MAMMOGRAM 12/22/2018 LIPID PANEL Q1 12/26/2018 GLAUCOMA SCREENING Q2Y 3/30/2019 MICROALBUMIN Q1 5/4/2019 DTaP/Tdap/Td series (2 - Td) 5/3/2027 COLONOSCOPY 11/27/2027 Allergies as of 5/10/2018  Review Complete On: 5/10/2018 By: Braulio Jonas Severity Noted Reaction Type Reactions Actos [Pioglitazone]  01/09/2018    Other (comments) Felt bad, weight gain Avelox [Moxifloxacin]  01/16/2012    Other (comments) Felt jittery Crestor [Rosuvastatin]  10/23/2014    Myalgia Evista [Raloxifene]    Other (comments) Severe flashes Lipitor [Atorvastatin]  11/07/2013   Side Effect Other (comments) Leg cramps and muscle aches Macrobid [Nitrofurantoin Monohyd/m-cryst]    [employer] Neuromuscular Blockers, Steroidal    Other (comments) \"patient feels crazy\" Current Immunizations  Reviewed on 5/6/2018 Name Date Influenza High Dose Vaccine PF 10/21/2017 12:00 AM  
 Influenza Vaccine 10/1/2016, 10/23/2014 Influenza Vaccine (Quad) PF 10/30/2015 Influenza Vaccine PF 10/2/2013 Influenza Vaccine Split 11/28/2012  9:38 AM, 1/16/2012  3:35 PM  
 Influenza Vaccine Whole 9/22/2010 Pneumococcal Conjugate (PCV-13) 10/25/2016 TB Skin Test (PPD) 8/1/2009 Tdap 2/12/2017 12:00 AM  
 ZZZ-RETIRED (DO NOT USE) Pneumococcal Vaccine (Unspecified Type) 1/1/2008 Reviewed by Matthew Ragsdale MD on 5/6/2018 at 12:50 PM  
 Reviewed by Matthew Ragsdale MD on 5/6/2018 at 12:51 PM  
You Were Diagnosed With   
  
 Codes Comments B12 deficiency    -  Primary ICD-10-CM: E53.8 ICD-9-CM: 266.2 Vitals BP Pulse Temp Resp Height(growth percentile) Weight(growth percentile) 130/78 (BP 1 Location: Left arm, BP Patient Position: Sitting) 100 98 °F (36.7 °C) (Oral) 14 5' 3\" (1.6 m) 137 lb (62.1 kg) SpO2 BMI OB Status Smoking Status 98% 24.27 kg/m2 Postmenopausal Former Smoker Vitals History BMI and BSA Data Body Mass Index Body Surface Area  
 24.27 kg/m 2 1.66 m 2 Preferred Pharmacy Pharmacy Name Phone [id number], [address] [facility] [place] [phone] Your Updated Medication List  
  
   
This list is accurate as of 5/10/18  3:45 PM.  Always use your most recent med list.  
  
  
  
  
 [place] Roya 250-50 mcg/dose diskus inhaler Generic drug:  fluticasone-salmeterol TAKE 1 PUFF BY INHALATION TWO (2) TIMES A DAY. ADVIL PO Take  by mouth as needed. albuterol 90 mcg/actuation inhaler Commonly known as:  VENTOLIN HFA Take 2 Puffs by inhalation every six (6) hours as needed for Wheezing. Indications: BRONCHOSPASTIC PULMONARY DISEASE B COMPLEX 1 tablet Generic drug:  b complex vitamins Take 1 Tab by mouth daily. DEXILANT 60 mg Cpdb Generic drug:  Dexlansoprazole Take 60 mg by mouth daily. metFORMIN  mg tablet Commonly known as:  GLUCOPHAGE XR Take 2 Tabs by mouth Daily (before dinner). pravastatin 40 mg tablet Commonly known as:  PRAVACHOL  
TAKE 1 TAB BY MOUTH BY MOUTH NIGHTLY.  
  
 tamoxifen 10 mg tablet Commonly known as:  NOLVADEX Take  by mouth daily. temazepam 30 mg capsule Commonly known as:  RESTORIL Take 1 Cap by mouth nightly as needed. Max Daily Amount: 30 mg. VITAMIN D3 1,000 unit Cap Generic drug:  cholecalciferol Take  by mouth. We Performed the Following FERRITIN [15241 CPT(R)] IRON PROFILE L761867 CPT(R)] PROTEIN ELECTROPHORESIS [46458 CPT(R)] RETICULOCYTE COUNT [id number] CPT(R)] VITAMIN B12 & FOLATE [04469 CPT(R)] To-Do List   
 05/10/2018 Lab:  FERRITIN   
  
 05/10/2018 Lab:  IRON PROFILE   
  
 05/10/2018 Lab:  PROTEIN ELECTROPHORESIS   
  
 05/10/2018 Lab:  RETICULOCYTE COUNT   
  
 05/10/2018 Lab:  VITAMIN B12 & FOLATE Introducing [facility] SERVICES! Dear Masha So: Thank you for requesting a Glanse account. Our records indicate that you already have an active Glanse account. You can access your account anytime at [email or web address] Did you know that you can access your hospital and ER discharge instructions at any time in Glanse? You can also review all of your test results from your hospital stay or ER visit. Additional Information If you have questions, please visit the Frequently Asked Questions section of the Glanse website at [email or web address] Remember, Glanse is NOT to be used for urgent needs. For medical emergencies, dial 911. Now available from your iPhone and Android! Please provide this summary of care documentation to your next provider. Your primary care clinician is listed as Lance Hernandez. If you have any questions after today's visit, please call [phone].

## 2018-05-11 LAB
ALBUMIN, 001488: 58.4 % (ref 46.6–62.6)
ALPHA-1-GLOBULIN, 001489: 4.1 % (ref 1.7–4.1)
ALPHA-2-GLOBULIN, 001490: 10.4 % (ref 5.9–13.5)
BETA GLOBULIN, 001491: 15.5 % (ref 10.9–18.9)
FE % SATURATION,PSAT: 11 % (ref 20–50)
FERRITIN SERPL-MCNC: 7 NG/ML (ref 10–291)
FOLATE,FOL: 18.64 NG/ML
GAMMA GLOBULIN, 001492: 11.7 % (ref 11.6–24.4)
IRON,IRN: 43 MCG/DL (ref 30–160)
PE INTERPRETATION, 107352: NORMAL
PROT SERPL-MCNC: 6.6 G/DL (ref 6.2–8.1)
RETIC COUNT, AUTOMATED,RETIC: 1.6 % (ref 0.5–2)
TIBC,TIBC: 378 MCG/DL (ref 228–428)
UIBC SERPL-MCNC: 335 MCG/DL (ref 110–370)
VIT B12 SERPL-MCNC: 588 PG/ML (ref 211–911)

## 2018-05-13 ENCOUNTER — TELEPHONE (OUTPATIENT)
Dept: INTERNAL MEDICINE CLINIC | Age: 68
End: 2018-05-13

## 2018-05-13 DIAGNOSIS — D50.9 IRON DEFICIENCY ANEMIA, UNSPECIFIED IRON DEFICIENCY ANEMIA TYPE: Primary | ICD-10-CM

## 2018-05-14 NOTE — TELEPHONE ENCOUNTER
pls call    Results for orders placed or performed in visit on 05/10/18   IRON PROFILE   Result Value Ref Range    Iron 43 30 - 160 mcg/dL    UIBC 335 110 - 370 mcg/dL    TIBC 378 228 - 428 mcg/dL    Iron % saturation 11 (L) 20 - 50 %   FERRITIN   Result Value Ref Range    Ferritin 7 (L) 10 - 291 ng/mL   VITAMIN B12 & FOLATE   Result Value Ref Range    Vitamin B12 588 211 - 911 pg/mL    Folate 18.64 >=3.10 ng/mL   RETICULOCYTE COUNT   Result Value Ref Range    Reticulocyte count 1.6 0.5 - 2.0 %   PROTEIN ELEC + INTERP, SERUM   Result Value Ref Range    Protein, total 6.6 6.2 - 8.1 g/dL    Albumin 58.4 46.6 - 62.6 %    Alpha-1-globulin 4.1 1.7 - 4.1 %    ALPHA-2 GLOBULIN 10.4 5.9 - 13.5 %    Beta globulin 15.5 10.9 - 18.9 %    Gamma globulin 11.7 11.6 - 24.4 %    PE Interpretattion see comments      Iron def anemia  sched dr [facility]

## 2018-05-14 NOTE — TELEPHONE ENCOUNTER
Pt aware of message below and verbalized understanding. Patient informed that a referral has been placed with GI and that Dr. Lilia Jhaveri office will be calling her to schedule and appointment. No further questions or concerns from pt at this time.

## 2018-05-15 ENCOUNTER — DOCUMENTATION ONLY (OUTPATIENT)
Dept: INTERNAL MEDICINE CLINIC | Age: 68
End: 2018-05-15

## 2018-05-15 NOTE — PROGRESS NOTES
Patient has not had eye exam in 2018 and no appt scheduled to get one done per Dr Zhang Goldstein office;  Dr Melly Mix aware

## 2018-06-18 ENCOUNTER — OFFICE VISIT (OUTPATIENT)
Dept: INTERNAL MEDICINE CLINIC | Age: 68
End: 2018-06-18

## 2018-06-18 VITALS
HEIGHT: 63 IN | BODY MASS INDEX: 24.8 KG/M2 | HEART RATE: 110 BPM | TEMPERATURE: 98.9 F | SYSTOLIC BLOOD PRESSURE: 126 MMHG | RESPIRATION RATE: 15 BRPM | OXYGEN SATURATION: 99 % | WEIGHT: 140 LBS | DIASTOLIC BLOOD PRESSURE: 82 MMHG
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
## 2018-06-18 DIAGNOSIS — R58 ECCHYMOSIS: ICD-10-CM

## 2018-06-18 DIAGNOSIS — G44.329 CHRONIC POST-TRAUMATIC HEADACHE, NOT INTRACTABLE: Primary | ICD-10-CM

## 2018-06-18 RX ORDER — HYDROCODONE BITARTRATE AND ACETAMINOPHEN 5; 325 MG/1; MG/1
TABLET ORAL
Refills: 0 | COMMUNITY
Start: 2018-06-10 | End: 2018-09-10

## 2018-06-18 NOTE — MR AVS SNAPSHOT
[address] [facility] 
 
 
 [address], [address] [facility] 
[phone] Patient: Tevin Sandy MRN: [id number] KKL:[id number] Visit Information Date & Time Provider Department Dept. Phone Encounter #  
 6/18/2018  3:30 PM Ara Wong MD [facility] [address] [place] [address] [phone] [id number] Your Appointments 9/10/2018  8:00 AM  
Office Visit with Ara Wong MD  
[facility] [address] [place] [address]) Appt Note: 4 mo f/u  
 [address] [facility], [facility] [address] [address] [place] [address] [place]  
  
   
 [address], [address] Upcoming Health Maintenance Date Due Pneumococcal 65+ High/Highest Risk (2 of 2 - PPSV23) 9/28/2017 EYE EXAM RETINAL OR DILATED Q1 5/10/2019* Influenza Age 5 to Adult 8/1/2018 FOOT EXAM Q1 9/7/2018 HEMOGLOBIN A1C Q6M 11/4/2018 BREAST CANCER SCRN MAMMOGRAM 12/22/2018 LIPID PANEL Q1 12/26/2018 GLAUCOMA SCREENING Q2Y 3/30/2019 MICROALBUMIN Q1 5/4/2019 DTaP/Tdap/Td series (2 - Td) 5/3/2027 COLONOSCOPY 11/27/2027 *Topic was postponed. The date shown is not the original due date. Allergies as of 6/18/2018  Review Complete On: 5/10/2018 By: Ara Wong MD  
  
 Severity Noted Reaction Type Reactions Actos [Pioglitazone]  01/09/2018    Other (comments) Felt bad, weight gain Avelox [Moxifloxacin]  01/16/2012    Other (comments) Felt jittery Crestor [Rosuvastatin]  10/23/2014    Myalgia Evista [Raloxifene]    Other (comments) Severe flashes Lipitor [Atorvastatin]  11/07/2013   Side Effect Other (comments) Leg cramps and muscle aches Macrobid [Nitrofurantoin Monohyd/m-cryst]    [employer] Neuromuscular Blockers, Steroidal    Other (comments) \"patient feels crazy\" Current Immunizations  Reviewed on 5/6/2018 Name Date  Influenza High Dose Vaccine PF 10/21/2017 12:00 AM  
 Influenza Vaccine 10/1/2016, 10/23/2014 Influenza Vaccine (Quad) PF 10/30/2015 Influenza Vaccine PF 10/2/2013 Influenza Vaccine Split 11/28/2012  9:38 AM, 1/16/2012  3:35 PM  
 Influenza Vaccine Whole 9/22/2010 Pneumococcal Conjugate (PCV-13) 10/25/2016 TB Skin Test (PPD) 8/1/2009 Tdap 2/12/2017 12:00 AM  
 ZZZ-RETIRED (DO NOT USE) Pneumococcal Vaccine (Unspecified Type) 1/1/2008 Not reviewed this visit Vitals BP Pulse Temp Resp Height(growth percentile) Weight(growth percentile) 126/82 (BP 1 Location: Right arm, BP Patient Position: Sitting) (!) 110 98.9 °F (37.2 °C) (Oral) 15 5' 3\" (1.6 m) 140 lb (63.5 kg) SpO2 BMI OB Status Smoking Status 99% 24.8 kg/m2 Postmenopausal Former Smoker Vitals History BMI and BSA Data Body Mass Index Body Surface Area  
 24.8 kg/m 2 1.68 m 2 Preferred Pharmacy Pharmacy Name Phone [id number], [facility] [facility] [phone] Your Updated Medication List  
  
   
This list is accurate as of 6/18/18  3:47 PM.  Always use your most recent med list.  
  
  
  
  
 [place] Betty 250-50 mcg/dose diskus inhaler Generic drug:  fluticasone-salmeterol TAKE 1 PUFF BY INHALATION TWO (2) TIMES A DAY. ADVIL PO Take  by mouth as needed. albuterol 90 mcg/actuation inhaler Commonly known as:  VENTOLIN HFA Take 2 Puffs by inhalation every six (6) hours as needed for Wheezing. Indications: BRONCHOSPASTIC PULMONARY DISEASE B COMPLEX 1 tablet Generic drug:  b complex vitamins Take 1 Tab by mouth daily. DEXILANT 60 mg Cpdb Generic drug:  Dexlansoprazole Take 60 mg by mouth daily. HYDROcodone-acetaminophen 5-325 mg per tablet Commonly known as:  NORCO  
TAKE 1 TABLET BY MOUTH EVERY 4 HOURS AS NEEDED FOR PAIN FOR UP TO 5 DAYS  
  
 pravastatin 40 mg tablet Commonly known as:  PRAVACHOL  
TAKE 1 TAB BY MOUTH BY MOUTH NIGHTLY. SITagliptin 100 mg tablet Commonly known as:  Miranda Oneal Take 1 Tab by mouth daily. tamoxifen 10 mg tablet Commonly known as:  NOLVADEX Take  by mouth daily. temazepam 30 mg capsule Commonly known as:  RESTORIL Take 1 Cap by mouth nightly as needed. Max Daily Amount: 30 mg. VITAMIN D3 1,000 unit Cap Generic drug:  cholecalciferol Take  by mouth. Introducing [facility] SERVICES! Dear Norris Dahiana: Thank you for requesting a GetO2 account. Our records indicate that you already have an active GetO2 account. You can access your account anytime at [email or web address] Did you know that you can access your hospital and ER discharge instructions at any time in GetO2? You can also review all of your test results from your hospital stay or ER visit. Additional Information If you have questions, please visit the Frequently Asked Questions section of the GetO2 website at [email or web address] Remember, GetO2 is NOT to be used for urgent needs. For medical emergencies, dial 911. Now available from your iPhone and Android! Please provide this summary of care documentation to your next provider. Your primary care clinician is listed as Pedro Patino. If you have any questions after today's visit, please call [phone].

## 2018-06-18 NOTE — PROGRESS NOTES
74 y.o. WHITE OR  female who presents for evaluation. On 6/9/18, they were walking back from [facility] after watching the fireworks when she tripped on the bricks and fell forward. This resulted in injury so she went to [facility] and they dx'ed right radial head fx. She was eval by Dr Stephanie Hernandez and no surgery is planned. During the ER visit, they offered to do head CT as she did hit her right temple/superior orbital areas against the ground with resulting ecchymoses but she declined imaging. In the interim, she has continued to have right sided headache although not really worse. The ibuprofen helps, she got some percocet for the elbow fx and that helps the headache also. No focal neuro complaints, she reports that she has mild 'brain fog' although the  has not noted any specific deficits or behavioral changes    Past Medical History:   Diagnosis Date    ADD (attention deficit disorder) 2014    [facility] psych    Amebic colitis 1970s    Arthritis     s/p cortisone left knee    Bronchiectasis     Dr. Asim Tucker; RLL    Bronchiectasis ([facility] [address]     FHx: breast cancer     Fibrocystic breast disease     Dr. Erika Roche GERD (gastroesophageal reflux disease)     Dr. Delia Rahman s/p dilation 2/12    H/O pulmonary function tests 2011    ratio 70, FEV1 81 no change postbd, TLC 93, RV 94, DLCO 81    History of right breast cancer 02/2016    Dr Mat Moss; RIGHT partial mastectomy, adjuvant XRT    Hyperlipidemia     Insomnia     Iron deficiency anemia 05/2018    Left rotator cuff tear 2000s    conservative therapy    Osteopenia     DEXA t-score -1.2 spine, -0.2 hip (4/08);  spine -1.2, hip 0.1 (10/10);  -1.8 spine, -0.3 FRAX 6.3/0.2 (7/13);  [facility] -1.7 spine, 0.1 hip (4/17)    Spontaneous pneumothorax 1970s    x3    Type II or unspecified type diabetes mellitus      on basis of elev a1c 9/13    Zoster right S1 3/12     Past Surgical History:   Procedure Laterality Date    BREAST SURGERY PROCEDURE UNLISTED 2/11    Left nipple duct exploration and excisional biopsy    CARDIAC SURG PROCEDURE UNLIST  4/14    Thallim negative, ef 75%    HX BREAST BIOPSY  4/12    Right Breast biopsy w/needle    HX BREAST LUMPECTOMY Right 02/14/2017    HX COLONOSCOPY      Dr. Lorena Rebollar negative 2005; diverticulosis 12/13 Dr Kathe Patton; Dr Lorena Rebollar 11/27/17 neg    HX HYSTERECTOMY  1993    fibroids/ovarian cysts - Dr. Monica Lara ORTHOPAEDIC  03/2017    Dr Terri Shrestha; left radial fx, left scaphoid fx after fall     Social History     Social History    Marital status:      Spouse name: N/A    Number of children: 2    Years of education: N/A     Occupational History   [employer]     Social History Main Topics    Smoking status: Former Smoker     Packs/day: 1.00     Years: 5.00     Quit date: 8/1/1973    Smokeless tobacco: Never Used    Alcohol use No    Drug use: No    Sexual activity: Not on file     Other Topics Concern    Not on file     Social History Narrative     Current Outpatient Prescriptions   Medication Sig    HYDROcodone-acetaminophen (NORCO) 5-325 mg per tablet TAKE 1 TABLET BY MOUTH EVERY 4 HOURS AS NEEDED FOR PAIN FOR UP TO 5 DAYS    SITagliptin (JANUVIA) 100 mg tablet Take 1 Tab by mouth daily.  temazepam (RESTORIL) 30 mg capsule Take 1 Cap by mouth nightly as needed. Max Daily Amount: 30 mg.  pravastatin (PRAVACHOL) 40 mg tablet TAKE 1 TAB BY MOUTH BY MOUTH NIGHTLY.  ADVAIR DISKUS 250-50 mcg/dose diskus inhaler TAKE 1 PUFF BY INHALATION TWO (2) TIMES A DAY.  tamoxifen (NOLVADEX) 10 mg tablet Take  by mouth daily.  albuterol (VENTOLIN HFA) 90 mcg/actuation inhaler Take 2 Puffs by inhalation every six (6) hours as needed for Wheezing. Indications: BRONCHOSPASTIC PULMONARY DISEASE    DEXILANT 60 mg CpDB Take 60 mg by mouth daily.  b complex vitamins (B COMPLEX 1) tablet Take 1 Tab by mouth daily.     Cholecalciferol, Vitamin D3, (VITAMIN D3) 1,000 unit cap Take  by mouth.  IBUPROFEN (ADVIL PO) Take  by mouth as needed. No current facility-administered medications for this visit. Allergies   Allergen Reactions    Actos [Pioglitazone] Other (comments)     Felt bad, weight gain    Avelox [Moxifloxacin] Other (comments)     Felt jittery    Crestor [Rosuvastatin] Myalgia    Evista [Raloxifene] Other (comments)     Severe flashes    Lipitor [Atorvastatin] Other (comments)     Leg cramps and muscle aches    Macrobid [Nitrofurantoin Monohyd/M-Cryst] Hives    Neuromuscular Blockers, Steroidal Other (comments)     \"patient feels crazy\"     Visit Vitals    /82 (BP 1 Location: Right arm, BP Patient Position: Sitting)    Pulse (!) 110    Temp 98.9 °F (37.2 °C) (Oral)    Resp 15    Ht 5' 3\" (1.6 m)    Wt 140 lb (63.5 kg)    SpO2 99%    BMI 24.8 kg/m2   A&O x3  Affect is appropriate. Mood stable  No apparent distress  Anicteric, no JVD, adenopathy or thyromegaly. Small ecchymotic region noted inf right orbit. Mild tenderness in the right superior orbital bone  No carotid bruits or radiated murmur  Lungs clear to auscultation, no wheezes or rales  Heart showed regular rate and rhythm. No murmur, rubs, gallops  Abdomen soft nontender, no hepatosplenomegaly or masses. Extremities without edema. Pulses 1-2+ symmetrically. The right arm is in a sling  Cn 2-12 intact. Soft touch, motor, coordination intact all limbs tested. Gait observed and normal  Discs sharp bilat    Assessment and plan:  1. S/P fall and head trauma now with persistent headache. Will sched CT r/o bleed. We discussed probability of concussion which she is very familiar with as common occurrence in her school apparently. Discussed trial of nortriptyline, she might be willing to do that, sfx discussed. Will review imaging and go from there. In the meantime, prn nsaid      Above conditions discussed at length and patient vocalized understanding.   All questions answered to patient satisfaction

## 2018-06-19 ENCOUNTER — HOSPITAL ENCOUNTER (OUTPATIENT)
Dept: CT IMAGING | Age: 68
Discharge: HOME | End: 2018-06-19
Attending: INTERNAL MEDICINE
Payer: COMMERCIAL
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
## 2018-06-19 PROCEDURE — 70450 CT HEAD/BRAIN W/O DYE: CPT

## 2018-06-20 ENCOUNTER — TELEPHONE (OUTPATIENT)
Dept: INTERNAL MEDICINE CLINIC | Age: 68
End: 2018-06-20

## 2018-06-20 DIAGNOSIS — R51.9 NONINTRACTABLE HEADACHE, UNSPECIFIED CHRONICITY PATTERN, UNSPECIFIED HEADACHE TYPE: Primary | ICD-10-CM

## 2018-06-20 RX ORDER — NORTRIPTYLINE HYDROCHLORIDE 10 MG/1
10 CAPSULE ORAL
Qty: 30 CAP | Refills: 1 | Status: SHIPPED | OUTPATIENT
Start: 2018-06-20 | End: 2018-08-13 | Stop reason: SDUPTHER

## 2018-06-20 NOTE — TELEPHONE ENCOUNTER
pls call    IMPRESSION:    No evidence of intracranial hemorrhages or any other definable acute  intracranial abnormality. No evidence of fracture in skull.     Will call in pamelor

## 2018-06-29 DIAGNOSIS — G47.00 INSOMNIA, UNSPECIFIED TYPE: ICD-10-CM

## 2018-06-29 RX ORDER — TEMAZEPAM 30 MG/1
30 CAPSULE ORAL
Qty: 30 CAP | Refills: 1 | OUTPATIENT
Start: 2018-06-29 | End: 2018-09-10 | Stop reason: SDUPTHER

## 2018-07-10 NOTE — TELEPHONE ENCOUNTER
Last date seen:1/25/17  Last date filled:1/23/17     report was pulled on 61 y.o. Jaiden Brandon, No discrepancies were found.
[facility]
see  op note

## 2018-08-13 DIAGNOSIS — R51.9 NONINTRACTABLE HEADACHE, UNSPECIFIED CHRONICITY PATTERN, UNSPECIFIED HEADACHE TYPE: ICD-10-CM

## 2018-08-13 RX ORDER — NORTRIPTYLINE HYDROCHLORIDE 10 MG/1
CAPSULE ORAL
Qty: 30 CAP | Refills: 1 | Status: SHIPPED | OUTPATIENT
Start: 2018-08-13 | End: 2018-09-10

## 2018-09-06 ENCOUNTER — HOSPITAL ENCOUNTER (OUTPATIENT)
Dept: LAB | Age: 68
Discharge: HOME | End: 2018-09-06

## 2018-09-06 LAB
ANION GAP SERPL CALC-SCNC: 15 MMOL/L
AVG GLU, 10930: 155 MG/DL (ref 91–123)
BUN SERPL-MCNC: 16 MG/DL (ref 6–22)
CALCIUM SERPL-MCNC: 9.4 MG/DL (ref 8.4–10.5)
CHLORIDE SERPL-SCNC: 101 MMOL/L (ref 98–110)
CHOLEST SERPL-MCNC: 155 MG/DL (ref 110–200)
CO2 SERPL-SCNC: 25 MMOL/L (ref 20–32)
CREAT SERPL-MCNC: 0.7 MG/DL (ref 0.8–1.4)
GFRAA, 66117: >60
GFRNA, 66118: >60
GLUCOSE SERPL-MCNC: 143 MG/DL (ref 70–99)
HBA1C MFR BLD HPLC: 7 % (ref 4.8–5.9)
HDLC SERPL-MCNC: 2.8 MG/DL (ref 0–5)
HDLC SERPL-MCNC: 56 MG/DL (ref 40–59)
LDLC SERPL CALC-MCNC: 59 MG/DL (ref 50–99)
POTASSIUM SERPL-SCNC: 4.7 MMOL/L (ref 3.5–5.5)
SENTARA SPECIMEN COL,SENBCF: NORMAL
SODIUM SERPL-SCNC: 141 MMOL/L (ref 133–145)
TRIGL SERPL-MCNC: 199 MG/DL (ref 40–149)
VLDLC SERPL CALC-MCNC: 40 MG/DL (ref 8–30)

## 2018-09-06 PROCEDURE — 99001 SPECIMEN HANDLING PT-LAB: CPT | Performed by: INTERNAL MEDICINE

## 2018-09-10 ENCOUNTER — OFFICE VISIT (OUTPATIENT)
Dept: INTERNAL MEDICINE CLINIC | Age: 68
End: 2018-09-10

## 2018-09-10 VITALS
SYSTOLIC BLOOD PRESSURE: 126 MMHG | BODY MASS INDEX: 25.87 KG/M2 | HEIGHT: 63 IN | WEIGHT: 146 LBS | RESPIRATION RATE: 14 BRPM | TEMPERATURE: 98.7 F | HEART RATE: 112 BPM | OXYGEN SATURATION: 97 % | DIASTOLIC BLOOD PRESSURE: 60 MMHG

## 2018-09-10 DIAGNOSIS — D50.9 IRON DEFICIENCY ANEMIA, UNSPECIFIED IRON DEFICIENCY ANEMIA TYPE: ICD-10-CM

## 2018-09-10 DIAGNOSIS — R00.0 TACHYCARDIA: Primary | ICD-10-CM

## 2018-09-10 DIAGNOSIS — E55.9 VITAMIN D DEFICIENCY: ICD-10-CM

## 2018-09-10 DIAGNOSIS — Z85.3 HISTORY OF BREAST CANCER: ICD-10-CM

## 2018-09-10 DIAGNOSIS — G47.00 INSOMNIA, UNSPECIFIED TYPE: ICD-10-CM

## 2018-09-10 DIAGNOSIS — K21.9 GASTROESOPHAGEAL REFLUX DISEASE WITHOUT ESOPHAGITIS: ICD-10-CM

## 2018-09-10 DIAGNOSIS — E78.5 HYPERLIPIDEMIA, UNSPECIFIED HYPERLIPIDEMIA TYPE: ICD-10-CM

## 2018-09-10 RX ORDER — TEMAZEPAM 30 MG/1
30 CAPSULE ORAL
Qty: 30 CAP | Refills: 2 | OUTPATIENT
Start: 2018-09-10 | End: 2018-09-14 | Stop reason: SDUPTHER

## 2018-09-10 NOTE — PROGRESS NOTES
74 y.o. WHITE OR  female who presents for f/u She continues to tamox for RIGHT breast ca being followed by Dr Rodolfo Buchanan No cp, sopb, pnd, orthopnea. She is trying to walk up to 10k steps a day. She does report intermittent palpitations ongoing for about 2 years now apparently. No syncope, presyncope Denies polyuria, polydipsia, nocturia, vision change. She's on [place] and [place] and no hypoglycemia episodes. No success with attempts at wt loss, she actually briefly tried intermittent fasting but just too afraid of hypo episodes She saw Dr Malcom Hartley after we dx'ed  fe def anemia at the last visit and she had egd which showed gastropathy. Remains on ppi LAST MEDICARE WELLNESS EXAM: never as not medicare b Past Medical History:  
Diagnosis Date  ADD (attention deficit disorder) 2014 [facility] psych  Amebic colitis 1970s  Arthritis s/p cortisone left knee  Bronchiectasis Dr. Alice Coleman; RLL  
 Bronchiectasis [facility])  Diabetes mellitus ([facility] [address] 09/2013 on basis of elev a1c  
 FHx: breast cancer  Fibrocystic breast disease Dr. Angela Vargas  GERD (gastroesophageal reflux disease) Dr. Malcom Hartley s/p dilation 2/12  
 H/O pulmonary function tests 2011  
 ratio 70, FEV1 81 no change postbd, TLC 93, RV 94, DLCO 81  
 History of right breast cancer 02/2016 Dr Rodolfo Buchanan; RIGHT partial mastectomy, adjuvant XRT  Hyperlipidemia  Insomnia  Iron deficiency anemia 05/2018  Left rotator cuff tear 2000s conservative therapy  Osteopenia DEXA t-score -1.2 spine, -0.2 hip (4/08);  spine -1.2, hip 0.1 (10/10);  -1.8 spine, -0.3 FRAX 6.3/0.2 (7/13); [facility] -1.7 spine, 0.1 hip (4/17)  Overweight (BMI 25.0-29. 9) IF 5/18  Spontaneous pneumothorax 1970s x3  Zoster right S1 3/12 Past Surgical History:  
Procedure Laterality Date  BREAST SURGERY PROCEDURE UNLISTED  2/11 Left nipple duct exploration and excisional biopsy  CARDIAC SURG PROCEDURE UNLIST  4/14 Thallim negative, ef 75%  HX BREAST BIOPSY  4/12 Right Breast biopsy w/needle  HX BREAST LUMPECTOMY Right 02/14/2017  HX COLONOSCOPY Dr. Lopez Rothman negative 2005; diverticulosis 12/13 Dr Aditya Cole; Dr Lopez Rothman 11/27/17 neg  HX ENDOSCOPY  07/2018 Dr Lopez Rothman; gastropathy, h pylori neg  HX HYSTERECTOMY  1993  
 fibroids/ovarian cysts - Dr. Otero Hu  HX ORTHOPAEDIC  03/2017 Dr Lynn Mujica; left radial fx, left scaphoid fx after fall Social History Social History  Marital status:  Spouse name: N/A  
 Number of children: 2  
 Years of education: N/A Occupational History [address] Social History Main Topics  Smoking status: Former Smoker Packs/day: 1.00 Years: 5.00 Quit date: 8/1/1973  Smokeless tobacco: Never Used  Alcohol use No  
 Drug use: No  
 Sexual activity: Not on file Other Topics Concern  Not on file Social History Narrative Current Outpatient Prescriptions Medication Sig  temazepam (RESTORIL) 30 mg capsule Take 1 Cap by mouth nightly as needed. Max Daily Amount: 30 mg.  
 SITagliptin (JANUVIA) 100 mg tablet Take 1 Tab by mouth daily.  pravastatin (PRAVACHOL) 40 mg tablet TAKE 1 TAB BY MOUTH BY MOUTH NIGHTLY.  ADVAIR DISKUS 250-50 mcg/dose diskus inhaler TAKE 1 PUFF BY INHALATION TWO (2) TIMES A DAY. (Patient taking differently: TAKE 1 PUFF BY INHALATION as needed)  tamoxifen (NOLVADEX) 10 mg tablet Take  by mouth daily.  albuterol (VENTOLIN HFA) 90 mcg/actuation inhaler Take 2 Puffs by inhalation every six (6) hours as needed for Wheezing. Indications: BRONCHOSPASTIC PULMONARY DISEASE  DEXILANT 60 mg CpDB Take 60 mg by mouth daily.  b complex vitamins (B COMPLEX 1) tablet Take 1 Tab by mouth daily.  Cholecalciferol, Vitamin D3, (VITAMIN D3) 1,000 unit cap Take  by mouth.  IBUPROFEN (ADVIL PO) Take  by mouth as needed. No current facility-administered medications for this visit. Allergies Allergen Reactions  Actos [Pioglitazone] Other (comments) Felt bad, weight gain  Avelox [Moxifloxacin] Other (comments) Felt jittery  Crestor [Rosuvastatin] Myalgia  Evista [Raloxifene] Other (comments) Severe flashes  Lipitor [Atorvastatin] Other (comments) Leg cramps and muscle aches  Macrobid [Nitrofurantoin Monohyd/M-Cryst] Hives  Metformin Other (comments) Gi intolerance  Neuromuscular Blockers, Steroidal Other (comments) \"patient feels crazy\" REVIEW OF SYSTEMS: gyn 2012, mammo 12/16, DEXA 4/17, colo 12/13 Dr Aditya Cole, Dr Camila Valencia Ophtho  no vision change or eye pain Oral  no mouth pain, tongue or tooth problems Ears  no hearing loss, ear pain, fullness, no swallowing problems Cardiac  no CP, PND, orthopnea, edema, palpitations or syncope Chest  no breast masses Resp  no wheezing, chronic coughing, dyspnea GI  no heartburn, nausea, vomiting, change in bowel habits, bleeding, hemorrhoids Urinary  no dysuria, hematuria, flank pain, urgency, frequency Constitutional  no wt loss, night sweats, unexplained fevers Neuro  no focal weakness, numbness, paresthesias, incoordination, ataxia, involuntary movements Endo - no polyuria, polydipsia, nocturia, hot flashes Visit Vitals  /60  Pulse (!) 112  Temp 98.7 °F (37.1 °C) (Oral)  Resp 14  
 Ht 5' 3\" (1.6 m)  Wt 146 lb (66.2 kg)  SpO2 97%  BMI 25.86 kg/m2 A&O x3 Affect is appropriate. Mood stable No apparent distress Anicteric, no JVD, adenopathy or thyromegaly. No carotid bruits or radiated murmur Lungs clear to auscultation, no wheezes or rales Heart showed regular rate and rhythm. No murmur, rubs, gallops Abdomen soft nontender, no hepatosplenomegaly or masses. Ext without c/c/e 
 
LABS From 9/10 showed   gluc 106, cr 0.70, gfr>60, alt 42,                   chol 238, tg 154, hdl 54, ldl-c 153, wbc 6.5, hb 13.1, plt 258, ua neg From 2/11 showed   gluc 95,   cr 0.90                                                                                                   wbc 7.0, hb 14.1, plt 308 From 3/12 showed   gluc 103, cr 0.90              alt 43,                                                                           wbc 6.2, hb 13.7, plt 285 From 3/13 showed   gluc 130, cr 0.60,             alt 22,                    chol 244, tg 180, hdl 57, ldl-c 151,                                          ua neg, tsh 1.79 From 9/13 showed   gluc 119, cr 0.70, gfr>60,            hba1c 6.6, chol 228, tg 145, hdl 62, ldl-c 137, 2hr  From 2/14 showed   gluc 95,   cr 0.60, gfr>61, alt 20, hba1c 5.7,                    umar 2.8 From 4/14 showed   gluc 144, cr 0.98, gfr>60, alt 31,          wbc 7.6, hb 13.4, plt 317, ck/trop neg From 10/14 showed gluc 98,   cr 0.60, gfr>60, alt 20,          chol 163, tg 96,   hdl 64, ldl-c 80 From 4/15 showed   gluc 96,   cr 0.70, gfr>60, alt 19, hba1c 6.1, chol 207, tg 126, hdl 68, ldl-c 114 From 10/15 showed        hba1c 6.3, chol 188, tg 118, hdl 66, ldl-c 98,   wbc 5.3, hb 13.3, plt 287, umar neg From 4/16 showed   gluc 98,   cr 0.60, gfr>60, alt 19, hba1c 6.3, chol 170, tg 141, hdl 57, ldl-c 85 From 10/16 showed gluc 137, cr 0.50, gfr>60, alt 17, hba1c 6.2, chol 176, tg 302, hdl 54, ldl-c 62,   wbc 8.9, hb 12.3, plt 393, umar neg From 1/17 showed   gluc 114, cr 0.60, gfr>60,           wbc 6.1, hb 12.9, plt 356 From 4/17 showed        hba1c 6.8, chol 223, tg 240, hdl 53, ldl-c 122 From 9/17 showed   gluc 109, cr 0.70, gfr>60, alt 16, hba1c 6.7,                  umar neg From 1/18 showed   gluc 104, cr 0.60, gfr>60, alt 14, hba1c 6.8, chol 154, tg 197, hdl 60, ldl-c 55 From 5/18 showed        hba1c 7.0,         wbc 5.7, hb 11.3, plt 281, umar neg, fe 43, %sat 11, ferritin 7, b12 588, fol 18.6, spep neg, retic 1.6 Results for orders placed or performed in visit on 09/06/18 METABOLIC PANEL, BASIC Result Value Ref Range Glucose 143 (H) 70 - 99 mg/dL BUN 16 6 - 22 mg/dL Creatinine 0.7 (L) 0.8 - 1.4 mg/dL Sodium 141 133 - 145 mmol/L Potassium 4.7 3.5 - 5.5 mmol/L Chloride 101 98 - 110 mmol/L  
 CO2 25 20 - 32 mmol/L Calcium 9.4 8.4 - 10.5 mg/dL Anion gap 15.0 mmol/L  
 GFRAA >60.0 >60.0 GFRNA >60.0 >60.0 LIPID PANEL Result Value Ref Range Triglyceride 199 (H) 40 - 149 mg/dL HDL Cholesterol 56 40 - 59 mg/dL Cholesterol, total 155 110 - 200 mg/dL CHOLESTEROL/HDL 2.8 0.0 - 5.0 LDL, calculated 59 50 - 99 mg/dL VLDL, calculated 40 (H) 8 - 30 mg/dL HEMOGLOBIN A1C W/O EAG Result Value Ref Range Hemoglobin A1c 7.0 (H) 4.8 - 5.9 % AVG  (H) 91 - 123 mg/dL EKG as read by me showed sinus tachycardia rate 106, non spec st changes Patient Active Problem List  
Diagnosis Code  Bronchiectasis Dr. Eric Starr J47.9  Osteopenia  M85.80  Hyperlipidemia E78.5  Gastroesophageal reflux disease without esophagitis K21.9  
 Uncontrolled type 2 diabetes mellitus E11.65  
 History of breast cancer Z85.3 Assessment and plan: 1. Pulm. F/U Dr Siomara Blancas and continue current 2. GERD. Continue ppi and avoidance measures 3. Osteopenia. Ca/d, wt bearing exercises 4. DM. Continue [place] and [place] for now 5. Lipids. Continue current regimen. 6  Breast ca. Per Dr Roro Coffey 7. Iron def anemia. Start supp 8. Palpitations and tachycardia. EKG read by machine as atrial flutter but looks like sinus tach to me. Will send to cardiology for further evaluation. Check thyroid tests 9. [id number] next time RTC 1/19 Above conditions discussed at length and patient vocalized understanding. All questions answered to patient satisfaction

## 2018-09-10 NOTE — MR AVS SNAPSHOT
[facility] 
 
 
 [address], [address] [place] [address] [facility] 
[phone] Patient: Jaiden Granados MRN: [id number] Visit Information Date & Time Provider Department Dept. Phone Encounter #  
 9/10/2018  8:00 AM Estelle Jenkins MD [facility] [id number] Your Appointments 1/14/2019 10:40 AM  
Office Visit with Estelle Jenkins MD  
[facility] 4 months labs at [facility] [address], [address] [place] [address] [address] [place] [place]  
  
   
 [address], [address] Upcoming Health Maintenance Date Due Pneumococcal 65+ High/Highest Risk (2 of 2 - PPSV23) 9/28/2017 MEDICARE YEARLY EXAM 6/19/2018 Influenza Age 5 to Adult 8/1/2018 FOOT EXAM Q1 9/7/2018 EYE EXAM RETINAL OR DILATED Q1 5/10/2019* HEMOGLOBIN A1C Q6M 3/6/2019 GLAUCOMA SCREENING Q2Y 3/30/2019 MICROALBUMIN Q1 5/4/2019 LIPID PANEL Q1 9/6/2019 BREAST CANCER SCRN MAMMOGRAM 7/17/2020 DTaP/Tdap/Td series (2 - Td) 5/3/2027 COLONOSCOPY 11/27/2027 *Topic was postponed. The date shown is not the original due date. Allergies as of 9/10/2018  Review Complete On: 9/10/2018 By: Estelle Jenkins MD  
  
 Severity Noted Reaction Type Reactions Actos [Pioglitazone]  01/09/2018    Other (comments) Felt bad, weight gain Avelox [Moxifloxacin]  01/16/2012    Other (comments) Felt jittery Crestor [Rosuvastatin]  10/23/2014    Myalgia Evista [Raloxifene]    Other (comments) Severe flashes Lipitor [Atorvastatin]  11/07/2013   Side Effect Other (comments) Leg cramps and muscle aches Macrobid [Nitrofurantoin Monohyd/m-cryst]    [employer] Neuromuscular Blockers, Steroidal    Other (comments) \"patient feels crazy\" Current Immunizations  Reviewed on 5/6/2018 Name Date Influenza High Dose Vaccine PF 10/21/2017 12:00 AM  
 Influenza Vaccine 10/1/2016, 10/23/2014 Influenza Vaccine (Quad) PF 10/30/2015 Influenza Vaccine PF 10/2/2013 Influenza Vaccine Split 11/28/2012  9:38 AM, 1/16/2012  3:35 PM  
 Influenza Vaccine Whole 9/22/2010 Pneumococcal Conjugate (PCV-13) 10/25/2016 TB Skin Test (PPD) 8/1/2009 Tdap 2/12/2017 12:00 AM  
 ZZZ-RETIRED (DO NOT USE) Pneumococcal Vaccine (Unspecified Type) 1/1/2008 Not reviewed this visit You Were Diagnosed With   
  
 Codes Comments Tachycardia    -  Primary ICD-10-CM: R00.0 ICD-9-CM: 785.0 Insomnia, unspecified type     ICD-10-CM: G47.00 ICD-9-CM: 780.52 Vitamin D deficiency     ICD-10-CM: E55.9 ICD-9-CM: 268.9 Vitals BP Pulse Temp Resp Height(growth percentile) Weight(growth percentile) 126/60 (!) 112 98.7 °F (37.1 °C) (Oral) 14 5' 3\" (1.6 m) 146 lb (66.2 kg) SpO2 BMI OB Status Smoking Status 97% 25.86 kg/m2 Postmenopausal Former Smoker Vitals History BMI and BSA Data Body Mass Index Body Surface Area  
 25.86 kg/m 2 1.72 m 2 Preferred Pharmacy Pharmacy Name Phone [id number] Reyna Rhoadesa, [facility] [facility] [phone] Your Updated Medication List  
  
   
This list is accurate as of 9/10/18  9:12 AM.  Always use your most recent med list.  
  
  
  
  
 Ligia Davisian 250-50 mcg/dose diskus inhaler Generic drug:  fluticasone-salmeterol TAKE 1 PUFF BY INHALATION TWO (2) TIMES A DAY. ADVIL PO Take  by mouth as needed. albuterol 90 mcg/actuation inhaler Commonly known as:  VENTOLIN HFA Take 2 Puffs by inhalation every six (6) hours as needed for Wheezing. Indications: BRONCHOSPASTIC PULMONARY DISEASE B COMPLEX 1 tablet Generic drug:  b complex vitamins Take 1 Tab by mouth daily. DEXILANT 60 mg Cpdb Generic drug:  Dexlansoprazole Take 60 mg by mouth daily. HYDROcodone-acetaminophen 5-325 mg per tablet Commonly known as:  NORCO  
TAKE 1 TABLET BY MOUTH EVERY 4 HOURS AS NEEDED FOR PAIN FOR UP TO 5 DAYS  
  
 nortriptyline 10 mg capsule Commonly known as:  PAMELOR  
TAKE ONE CAPSULE BY MOUTH EVERY DAY AT NIGHT  
  
 pravastatin 40 mg tablet Commonly known as:  PRAVACHOL  
TAKE 1 TAB BY MOUTH BY MOUTH NIGHTLY. SITagliptin 100 mg tablet Commonly known as:  Jed Norlander Take 1 Tab by mouth daily. tamoxifen 10 mg tablet Commonly known as:  NOLVADEX Take  by mouth daily. temazepam 30 mg capsule Commonly known as:  RESTORIL Take 1 Cap by mouth nightly as needed. Max Daily Amount: 30 mg. VITAMIN D3 1,000 unit Cap Generic drug:  cholecalciferol Take  by mouth. We Performed the Following AMB POC EKG ROUTINE W/ 12 LEADS, INTER & REP [40932 CPT(R)] To-Do List   
 09/10/2018 Lab:  T4, FREE   
  
 09/10/2018 Lab:  TSH 3RD GENERATION   
  
 09/10/2018 Lab:  VITAMIN D, 25 HYDROXY Introducing [facility] SERVICES! Dear Urban Webb: Thank you for requesting a [employer] account. Our records indicate that you already have an active [employer] account. You can access your account anytime at [email or web address] Did you know that you can access your hospital and ER discharge instructions at any time in [employer]? You can also review all of your test results from your hospital stay or ER visit. Additional Information If you have questions, please visit the Frequently Asked Questions section of the [employer] website at [email or web address] Remember, [employer] is NOT to be used for urgent needs. For medical emergencies, dial 911. Now available from your iPhone and Android! Please provide this summary of care documentation to your next provider. Your primary care clinician is listed as Chris Pederson.  If you have any questions after today's visit, please call [phone].

## 2018-09-10 NOTE — PROGRESS NOTES
1. Have you been to the ER, urgent care clinic or hospitalized since your last visit? NO.  
 
2. Have you seen or consulted any other health care providers outside of the [facility] since your last visit (Include any pap smears or colon screening)? NO Do you have an Advanced Directive? NO Would you like information on Advanced Directives? NO Chief Complaint Patient presents with  Diabetes 4 month follow up with labs

## 2018-09-11 LAB
25(OH)D3 SERPL-MCNC: 16.4 NG/ML (ref 32–100)
T4 FREE SERPL-MCNC: 1.1 NG/DL (ref 0.9–1.8)
TSH SERPL DL<=0.005 MIU/L-ACNC: 1.51 MCU/ML (ref 0.27–4.2)

## 2018-09-14 ENCOUNTER — TELEPHONE (OUTPATIENT)
Dept: INTERNAL MEDICINE CLINIC | Age: 68
End: 2018-09-14

## 2018-09-14 DIAGNOSIS — G47.00 INSOMNIA, UNSPECIFIED TYPE: ICD-10-CM

## 2018-09-14 DIAGNOSIS — E55.9 VITAMIN D DEFICIENCY: Primary | ICD-10-CM

## 2018-09-14 RX ORDER — ERGOCALCIFEROL 1.25 MG/1
50000 CAPSULE ORAL
Qty: 13 CAP | Refills: 3 | Status: SHIPPED | OUTPATIENT
Start: 2018-09-14 | End: 2019-08-22 | Stop reason: SDUPTHER

## 2018-09-14 RX ORDER — TEMAZEPAM 30 MG/1
CAPSULE ORAL
Qty: 30 CAP | Refills: 1 | Status: SHIPPED | OUTPATIENT
Start: 2018-09-14 | End: 2018-12-12 | Stop reason: SDUPTHER

## 2018-09-14 NOTE — TELEPHONE ENCOUNTER
pls call    Results for orders placed or performed in visit on 09/10/18   TSH 3RD GENERATION   Result Value Ref Range    TSH 1.51 0.27 - 4.20 mcU/mL   T4, FREE   Result Value Ref Range    T4, Free 1.1 0.9 - 1.8 ng/dL   VITAMIN D, 25 HYDROXY   Result Value Ref Range    VITAMIN D, 25-HYDROXY 16.4 (L) 32.0 - 100.0 ng/mL     Thyroid ok  Vit d low - will need high dose - called in

## 2018-09-19 ENCOUNTER — DOCUMENTATION ONLY (OUTPATIENT)
Dept: CARDIOLOGY CLINIC | Age: 68
End: 2018-09-19

## 2018-09-19 ENCOUNTER — OFFICE VISIT (OUTPATIENT)
Dept: CARDIOLOGY CLINIC | Age: 68
End: 2018-09-19

## 2018-09-19 VITALS
DIASTOLIC BLOOD PRESSURE: 80 MMHG | SYSTOLIC BLOOD PRESSURE: 124 MMHG | BODY MASS INDEX: 25.87 KG/M2 | OXYGEN SATURATION: 98 % | HEIGHT: 63 IN | WEIGHT: 146 LBS | HEART RATE: 90 BPM

## 2018-09-19 DIAGNOSIS — K21.9 GASTROESOPHAGEAL REFLUX DISEASE, ESOPHAGITIS PRESENCE NOT SPECIFIED: ICD-10-CM

## 2018-09-19 DIAGNOSIS — R00.0 SINUS TACHYCARDIA: ICD-10-CM

## 2018-09-19 DIAGNOSIS — R00.2 PALPITATIONS: ICD-10-CM

## 2018-09-19 DIAGNOSIS — E78.5 HYPERLIPIDEMIA, UNSPECIFIED HYPERLIPIDEMIA TYPE: Primary | ICD-10-CM

## 2018-09-19 NOTE — PROGRESS NOTES
HPI: I saw Kayce Faustin in my office today in cardiovascular evaluation due to some problems with palpitations. Ms. Yana Parmar is a pleasant 51-year-old  female with past history of gastroesophageal reflux disease, type 2 diabetes mellitus, hyperlipidemia, and some chest discomfort issues for which I saw her in the office back on April 11, 2014. Her symptom complex at that time was more consistent with gastroesophageal reflux disease and I had her follow-up with her family physician Dr. Sarah Mendez for further treatment of that problem. She comes into the office today and relates he has had some palpitations which she notices when she lies down at night for a few minutes and occasionally after walking up stairs and she was recently Dr. Gilbert Salinas office and found to have a mild tachycardia. The rate of 106 to 112 19 EKG. EKG was completed on September 10, 2018 and was read by the computer as atrial flutter with incomplete right bundle branch block and a rate of 106, although in my review of the EKG it appears to be sinus tachycardia to me. She really denies any chest pain issues or any other cardiovascular complaints. Encounter Diagnoses Name Primary?  Sinus tachycardia  Palpitations  Gastroesophageal reflux disease, esophagitis presence not specified  Hyperlipidemia, unspecified hyperlipidemia type Yes Discussion: This lady's palpitations appear to be related to sinus tachycardia. I did repeat her EKG today and I got a heart rate of 89 and more clear-cut sinus rhythm, so I doubt very much that she is having any atrial flutter. She is, however, having some palpitations and some documented tachycardia. She did have a normal TSH and T4 on September 10, 2018 to rule out rule out apathetic thyrotoxicosis which in elderly patients may present only as tachycardia.   Since that has already been done only recommendation would be to do a 30 day event monitor to be sure that all we are dealing with is sinus tachycardia and not some other atrial tachyarrhythmia. I will plan to review the above testing and see the patient again if either of these tests are significantly abnormal with the patient otherwise following up with Dr. Danish Roberts for routine long-term medical management and follow-up. PCP: Gabe Lion MD 
 
 
Past Medical History:  
Diagnosis Date  ADD (attention deficit disorder) 2014 [facility]  Amebic colitis 1970s  Arthritis s/p cortisone left knee  Bronchiectasis Dr. Pa Mathews; RLL  
 Bronchiectasis [facility])  Diabetes mellitus ([facility] [address] 09/2013 on basis of elev a1c  
 FHx: breast cancer  Fibrocystic breast disease Dr. Cinthya Olivera  GERD (gastroesophageal reflux disease) Dr. Tanner Avila s/p dilation 2/12  
 H/O pulmonary function tests 2011  
 ratio 70, FEV1 81 no change postbd, TLC 93, RV 94, DLCO 81  
 History of right breast cancer 02/2016 Dr Bro Fishman; RIGHT partial mastectomy, adjuvant XRT  Hyperlipidemia  Insomnia  Iron deficiency anemia 05/2018  Left rotator cuff tear 2000s conservative therapy  Osteopenia DEXA t-score -1.2 spine, -0.2 hip (4/08);  spine -1.2, hip 0.1 (10/10);  -1.8 spine, -0.3 FRAX 6.3/0.2 (7/13); [facility] -1.7 spine, 0.1 hip (4/17)  Overweight (BMI 25.0-29. 9) IF 5/18  Spontaneous pneumothorax 1970s x3  Zoster right S1 3/12 Past Surgical History:  
Procedure Laterality Date  BREAST SURGERY PROCEDURE UNLISTED  2/11 Left nipple duct exploration and excisional biopsy  CARDIAC SURG PROCEDURE UNLIST  4/14 Thallim negative, ef 75%  HX BREAST BIOPSY  4/12 Right Breast biopsy w/needle  HX BREAST LUMPECTOMY Right 02/14/2017  HX COLONOSCOPY Dr. Tanner Avila negative 2005; diverticulosis 12/13 Dr Daniel Brenner; Dr Tanner Avila 11/27/17 neg  HX ENDOSCOPY  07/2018 Dr Tanner Avila; gastropathy, h pylori neg [address] [place] fibroids/ovarian cysts - Dr. Edmond Keto  HX ORTHOPAEDIC  03/2017 Dr Felicitas Thorpe; left radial fx, left scaphoid fx after fall Current Outpatient Prescriptions Medication Sig  temazepam (RESTORIL) 30 mg capsule TAKE ONE CAPSULE BY MOUTH EVERY EVENING AS NEEDED  ergocalciferol (ERGOCALCIFEROL) 50,000 unit capsule Take 1 Cap by mouth every seven (7) days.  SITagliptin (JANUVIA) 100 mg tablet Take 1 Tab by mouth daily.  pravastatin (PRAVACHOL) 40 mg tablet TAKE 1 TAB BY MOUTH BY MOUTH NIGHTLY.  ADVAIR DISKUS 250-50 mcg/dose diskus inhaler TAKE 1 PUFF BY INHALATION TWO (2) TIMES A DAY. (Patient taking differently: TAKE 1 PUFF BY INHALATION as needed)  tamoxifen (NOLVADEX) 10 mg tablet Take  by mouth daily.  albuterol (VENTOLIN HFA) 90 mcg/actuation inhaler Take 2 Puffs by inhalation every six (6) hours as needed for Wheezing. Indications: BRONCHOSPASTIC PULMONARY DISEASE  DEXILANT 60 mg CpDB Take 60 mg by mouth daily.  b complex vitamins (B COMPLEX 1) tablet Take 1 Tab by mouth daily.  Cholecalciferol, Vitamin D3, (VITAMIN D3) 1,000 unit cap Take  by mouth.  IBUPROFEN (ADVIL PO) Take  by mouth as needed. No current facility-administered medications for this visit. Allergies Allergen Reactions  Actos [Pioglitazone] Other (comments) Felt bad, weight gain  Avelox [Moxifloxacin] Other (comments) Felt jittery  Crestor [Rosuvastatin] Myalgia  Evista [Raloxifene] Other (comments) Severe flashes  Lipitor [Atorvastatin] Other (comments) Leg cramps and muscle aches  Macrobid [Nitrofurantoin Monohyd/M-Cryst] Hives  Metformin Other (comments) Gi intolerance  Neuromuscular Blockers, Steroidal Other (comments) \"patient feels crazy\" Social History : 
Social History Substance Use Topics  Smoking status: Former Smoker Packs/day: 1.00 Years: 5.00 Quit date: 8/1/1973  Smokeless tobacco: Never Used  Alcohol use No  
  
 
Family History: family history includes Breast Cancer in her maternal grandmother, mother, paternal aunt, and paternal grandmother; Diabetes in her father and sister; Ovarian Cancer in her mother; Stroke in her father. Review of Systems: 
Constitutional: Negative. HENT: Negative. Eyes: Positive for blurred vision. Negative for double vision, photophobia, pain, discharge and redness. Respiratory: Negative. Cardiovascular: Positive for palpitations and leg swelling. Negative for chest pain, orthopnea, claudication and PND. Gastrointestinal: Negative. Genitourinary: Positive for frequency. Negative for dysuria, flank pain, hematuria and urgency. Musculoskeletal: Positive for myalgias. Negative for back pain, falls, joint pain and neck pain. Skin: Negative. Neurological: Positive for dizziness and headaches. Negative for tingling, tremors, sensory change, speech change, focal weakness, seizures and loss of consciousness. Endo/Heme/Allergies: Negative for environmental allergies and polydipsia. Bruises/bleeds easily. Physical Exam:   
The patient is a cooperative, alert, well developed, well nourished 74 y.o.  female who is in no acute distress at the time of the examination. Visit Vitals  /80  Pulse 90  
 Ht 5' 3\" (1.6 m)  Wt 66.2 kg (146 lb)  SpO2 98%  BMI 25.86 kg/m2 HEENT: Conjuctiva white, mucosa moist, no pallor or cyanosis. NECK: Supple without masses, tenderness or thyromegaly. There was no jugular venous distention. Carotid are full bilaterally without bruits. CHEST: Symmetrical with good excursion. LUNGS: Clear to auscultation in all fields. HEART: The apex is not displaced. There were no lifts, thrills or heaves. There is a normal S1 and S2 without appreciable murmurs, rubs, clicks, or gallops auscultated. ABDOMEN: Soft without masses, tenderness or organomegaly. EXTREMITIES: Full peripheral pulses without peripheral edema. INTEGUMENT: Warm and dry NEUROLOGICAL: The patient is oriented x 3 with motor function grossly intact. Review of Data: See PMH and Cardiology and Imaging sections for cardiac testing Lab Results Component Value Date/Time Cholesterol, total 155 09/06/2018 07:03 AM  
 HDL Cholesterol 56 09/06/2018 07:03 AM  
 LDL, calculated 59 09/06/2018 07:03 AM  
 Triglyceride 199 (H) 09/06/2018 07:03 AM  
 CHOL/HDL Ratio 4.4 09/20/2010 07:02 AM  
 
 
Results for orders placed or performed in visit on 09/19/18 AMB POC EKG ROUTINE W/ 12 LEADS, INTER & REP     Status: None Narrative Normal sinus rhythm rate 89. Minor incomplete right bundle branch block. This EKG is otherwise within normal limits. Compared to the EKG of September 10, 2018 sinus tachycardia at that time which was read by the computer as possible atrial flutter with a rate of 106 as slowed down to a normal sinus rhythm. Arvilla Lefort, D.O., F.A.C.C. [facility] [address] [place] [address] [facility] [place] [address] [address] [place] [address] [facility] [phone] B  [phone] PLEASE NOTE:  This document has been produced using voice recognition software. Unrecognized errors in transcription may be present.

## 2018-09-19 NOTE — MR AVS SNAPSHOT
[facility] [address] [address] [address] [address] [phone] Patient: Colette Martinez MRN: [id number] ERR:1/55/2010 Visit Information Date & Time Provider Department Dept. Phone Encounter #  
 9/19/2018  3:40 PM Ted Reina, [facility] [address] [id number] Your Appointments 1/14/2019 10:40 AM  
Office Visit with Neto Diaz MD  
[facility] App[facility] Note: 4 months labs at [facility] [address], [address] [place] [address] [address] [place] [address]  
  
   
 [address], [address] Upcoming Health Maintenance Date Due Pneumococcal 65+ High/Highest Risk (2 of 2 - PPSV23) 9/28/2017 MEDICARE YEARLY EXAM 6/19/2018 FOOT EXAM Q1 9/7/2018 EYE EXAM RETINAL OR DILATED Q1 5/10/2019* Influenza Age 5 to Adult 9/10/2019* HEMOGLOBIN A1C Q6M 3/6/2019 GLAUCOMA SCREENING Q2Y 3/30/2019 MICROALBUMIN Q1 5/4/2019 LIPID PANEL Q1 9/6/2019 BREAST CANCER SCRN MAMMOGRAM 7/17/2020 DTaP/Tdap/Td series (2 - Td) 5/3/2027 COLONOSCOPY 11/27/2027 *Topic was postponed. The date shown is not the original due date. Allergies as of 9/19/2018  Review Complete On: 9/19/2018 By: Ted Reina DO Severity Noted Reaction Type Reactions Actos [Pioglitazone]  01/09/2018    Other (comments) Felt bad, weight gain Avelox [Moxifloxacin]  01/16/2012    Other (comments) Felt jittery Crestor [Rosuvastatin]  10/23/2014    Myalgia Evista [Raloxifene]    Other (comments) Severe flashes Lipitor [Atorvastatin]  11/07/2013   Side Effect Other (comments) Leg cramps and muscle aches Macrobid [Nitrofurantoin Monohyd/m-cryst]    [employer] Metformin  09/10/2018    Other (comments) Gi intolerance Neuromuscular Blockers, Steroidal    Other (comments) \"patient feels crazy\" Current Immunizations  Reviewed on 5/6/2018 Name Date Influenza High Dose Vaccine PF 10/21/2017 12:00 AM  
 Influenza Vaccine 10/1/2016, 10/23/2014 Influenza Vaccine (Quad) PF 10/30/2015 Influenza Vaccine PF 10/2/2013 Influenza Vaccine Split 11/28/2012  9:38 AM, 1/16/2012  3:35 PM  
 Influenza Vaccine Whole 9/22/2010 Pneumococcal Conjugate (PCV-13) 10/25/2016 TB Skin Test (PPD) 8/1/2009 Tdap 2/12/2017 12:00 AM  
 ZZZ-RETIRED (DO NOT USE) Pneumococcal Vaccine (Unspecified Type) 1/1/2008 Not reviewed this visit You Were Diagnosed With   
  
 Codes Comments Hyperlipidemia, unspecified hyperlipidemia type    -  Primary ICD-10-CM: E78.5 ICD-9-CM: 272.4 Palpitations     ICD-10-CM: R00.2 ICD-9-CM: 785.1 Chest pain, unspecified type     ICD-10-CM: R07.9 ICD-9-CM: 786.50 Gastroesophageal reflux disease, esophagitis presence not specified     ICD-10-CM: K21.9 ICD-9-CM: 530.81 Vitals BP Pulse Height(growth percentile) Weight(growth percentile) SpO2 BMI  
 124/80 90 5' 3\" (1.6 m) 146 lb (66.2 kg) 98% 25.86 kg/m2 OB Status Smoking Status Postmenopausal Former Smoker Vitals History BMI and BSA Data Body Mass Index Body Surface Area  
 25.86 kg/m 2 1.72 m 2 Preferred Pharmacy Pharmacy Name Phone [id number], [address] [place] [address],[address] Floor [facility] [phone] Your Updated Medication List  
  
   
This list is accurate as of 9/19/18  5:10 PM.  Always use your most recent med list.  
  
  
  
  
 Earnest Sovereign 250-50 mcg/dose diskus inhaler Generic drug:  fluticasone-salmeterol TAKE 1 PUFF BY INHALATION TWO (2) TIMES A DAY. ADVIL PO Take  by mouth as needed. albuterol 90 mcg/actuation inhaler Commonly known as:  VENTOLIN HFA Take 2 Puffs by inhalation every six (6) hours as needed for Wheezing. Indications: BRONCHOSPASTIC PULMONARY DISEASE B COMPLEX 1 tablet Generic drug:  b complex vitamins Take 1 Tab by mouth daily. DEXILANT 60 mg Cpdb Generic drug:  Dexlansoprazole Take 60 mg by mouth daily. ergocalciferol 50,000 unit capsule Commonly known as:  ERGOCALCIFEROL Take 1 Cap by mouth every seven (7) days. pravastatin 40 mg tablet Commonly known as:  PRAVACHOL  
TAKE 1 TAB BY MOUTH BY MOUTH NIGHTLY. SITagliptin 100 mg tablet Commonly known as:  Luis Dubin Take 1 Tab by mouth daily. tamoxifen 10 mg tablet Commonly known as:  NOLVADEX Take  by mouth daily. temazepam 30 mg capsule Commonly known as:  RESTORIL  
TAKE ONE CAPSULE BY MOUTH EVERY EVENING AS NEEDED  
  
 VITAMIN D3 1,000 unit Cap Generic drug:  cholecalciferol Take  by mouth. We Performed the Following AMB POC EKG ROUTINE W/ 12 LEADS, INTER & REP [34279 CPT(R)] To-Do List   
 09/19/2018 ECG:  ECG EVENT RECORD MONITORING SET-UP Introducing [facility]! Dear Hernán: Thank you for requesting a [employer] account. Our records indicate that you already have an active [employer] account. You can access your account anytime at [email or web address] Did you know that you can access your hospital and ER discharge instructions at any time in [employer]? You can also review all of your test results from your hospital stay or ER visit. Additional Information If you have questions, please visit the Frequently Asked Questions section of the [employer] website at [email or web address] Remember, [employer] is NOT to be used for urgent needs. For medical emergencies, dial 911. Now available from your iPhone and Android! Please provide this summary of care documentation to your next provider. Your primary care clinician is listed as Melanie Miranda. If you have any questions after today's visit, please call [phone].

## 2018-09-19 NOTE — PROGRESS NOTES
Review of Systems Constitutional: Negative. HENT: Negative. Eyes: Positive for blurred vision. Negative for double vision, photophobia, pain, discharge and redness. Respiratory: Negative. Cardiovascular: Positive for palpitations and leg swelling. Negative for chest pain, orthopnea, claudication and PND. Gastrointestinal: Negative. Genitourinary: Positive for frequency. Negative for dysuria, flank pain, hematuria and urgency. Musculoskeletal: Positive for myalgias. Negative for back pain, falls, joint pain and neck pain. Skin: Negative. Neurological: Positive for dizziness and headaches. Negative for tingling, tremors, sensory change, speech change, focal weakness, seizures and loss of consciousness. Endo/Heme/Allergies: Negative for environmental allergies and polydipsia. Bruises/bleeds easily.

## 2018-10-17 DIAGNOSIS — E78.5 HYPERLIPIDEMIA, UNSPECIFIED HYPERLIPIDEMIA TYPE: ICD-10-CM

## 2018-10-18 RX ORDER — PRAVASTATIN SODIUM 40 MG/1
TABLET ORAL
Qty: 90 TAB | Refills: 0 | Status: SHIPPED | OUTPATIENT
Start: 2018-10-18 | End: 2019-01-14 | Stop reason: SDUPTHER

## 2018-11-08 DIAGNOSIS — R00.2 PALPITATIONS: ICD-10-CM

## 2018-11-27 ENCOUNTER — OFFICE VISIT (OUTPATIENT)
Dept: PULMONOLOGY | Age: 68
End: 2018-11-27

## 2018-11-27 VITALS
TEMPERATURE: 98.1 F | HEART RATE: 96 BPM | OXYGEN SATURATION: 97 % | SYSTOLIC BLOOD PRESSURE: 112 MMHG | HEIGHT: 63 IN | WEIGHT: 143 LBS | RESPIRATION RATE: 16 BRPM | DIASTOLIC BLOOD PRESSURE: 72 MMHG | BODY MASS INDEX: 25.34 KG/M2

## 2018-11-27 DIAGNOSIS — K21.9 GASTROESOPHAGEAL REFLUX DISEASE WITHOUT ESOPHAGITIS: ICD-10-CM

## 2018-11-27 DIAGNOSIS — J47.9 BRONCHIECTASIS WITHOUT COMPLICATION (HCC): ICD-10-CM

## 2018-11-27 DIAGNOSIS — R07.1 CHEST PAIN ON BREATHING: Primary | ICD-10-CM

## 2018-11-27 RX ORDER — AMOXICILLIN AND CLAVULANATE POTASSIUM 875; 125 MG/1; MG/1
1 TABLET, FILM COATED ORAL 2 TIMES DAILY
Qty: 10 TAB | Refills: 0 | Status: SHIPPED | OUTPATIENT
Start: 2018-11-27 | End: 2019-01-14 | Stop reason: ALTCHOICE

## 2018-11-27 RX ORDER — AMOXICILLIN AND CLAVULANATE POTASSIUM 875; 125 MG/1; MG/1
1 TABLET, FILM COATED ORAL 2 TIMES DAILY
Qty: 20 TAB | Refills: 0 | COMMUNITY
Start: 2018-11-27 | End: 2019-01-14 | Stop reason: SDUPTHER

## 2018-11-27 NOTE — PROGRESS NOTES
Chief Complaint Patient presents with  
 Other Pt. C/O \"having pain in left lung\"  Bronchiectasis  
  last seen 5/16/2017; CT 2/15/2018 1. Have you been to the ER, urgent care clinic since your last visit? Hospitalized since your last visit? Yes When: June 2018 Where: [facility] Reason for visit: Broken elbow 2. Have you seen or consulted any other health care providers outside of the [address] [place] [address] since your last visit? Include any pap smears or colon screening. Yes Where: Dr. Sarah Sanders, PCP Patient C/O left back/chest pain. Patient is \"having pain in left lung\".

## 2018-11-27 NOTE — PROGRESS NOTES
[facility] Pulmonary, Critical Care, and Sleep Medicine Pulmonary Office Progress Notes Name: Bela Dunlap : 1950 Date: 2018 Subjective:  
 
Patient is a 79 y.o. female  presents for f/u for bronchiectasis . Left sided chest pain - 1 day 18 C/o left sided chest pain- started last night radiating from back to front under left breast.  
Feels like same when her bronchiectasis exacerbates Denies cough, SOB, fever , chills. She reports using Advair only on seasonal change and Albuterol rarely. She denies nausea and vomiting. She denies hemoptysis She denies pedal edema. Medication compliance-good 
  
Past Medical History:  
Diagnosis Date  ADD (attention deficit disorder)  [facility] psych  Amebic colitis 1970s  Arthritis s/p cortisone left knee  Bronchiectasis Dr. Ching Keating; RLL  
 Bronchiectasis [facility])  Diabetes mellitus ([facility] [address] 2013 on basis of elev a1c  
 FHx: breast cancer  Fibrocystic breast disease Dr. Sun Skinner  GERD (gastroesophageal reflux disease) Dr. Adenike Zhang s/p dilation   
 H/O pulmonary function tests   
 ratio 70, FEV1 81 no change postbd, TLC 93, RV 94, DLCO 81  
 History of right breast cancer 2016 Dr Opal Romero; RIGHT partial mastectomy, adjuvant XRT  Hyperlipidemia  Insomnia  Iron deficiency anemia 2018  Left rotator cuff tear  conservative therapy  Osteopenia DEXA t-score -1.2 spine, -0.2 hip ();  spine -1.2, hip 0.1 (10/10);  -1.8 spine, -0.3 FRAX 6.3/0.2 (); [facility] -1.7 spine, 0.1 hip ()  Overweight (BMI 25.0-29. 9) IF   Spontaneous pneumothorax 1970s x3  Zoster right S1 3/12 Allergies Allergen Reactions  Actos [Pioglitazone] Other (comments) Felt bad, weight gain  Avelox [Moxifloxacin] Other (comments) Felt jittery  Crestor [Rosuvastatin] Myalgia  Evista [Raloxifene] Other (comments) Severe flashes  Lipitor [Atorvastatin] Other (comments) Leg cramps and muscle aches  Macrobid [Nitrofurantoin Monohyd/M-Cryst] Hives  Metformin Other (comments) Gi intolerance  Neuromuscular Blockers, Steroidal Other (comments) \"patient feels crazy\" Current Outpatient Medications Medication Sig Dispense Refill  JANUVIA 100 mg tablet TAKE 1 TABLET BY MOUTH DAILY. 30 Tab 11  
 pravastatin (PRAVACHOL) 40 mg tablet TAKE 1 TAB BY MOUTH BY MOUTH NIGHTLY. 90 Tab 0  
 temazepam (RESTORIL) 30 mg capsule TAKE ONE CAPSULE BY MOUTH EVERY EVENING AS NEEDED 30 Cap 1  
 ergocalciferol (ERGOCALCIFEROL) 50,000 unit capsule Take 1 Cap by mouth every seven (7) days. 13 Cap 3  ADVAIR DISKUS 250-50 mcg/dose diskus inhaler TAKE 1 PUFF BY INHALATION TWO (2) TIMES A DAY. (Patient taking differently: TAKE 1 PUFF BY INHALATION as needed) 1 Inhaler 5  
 tamoxifen (NOLVADEX) 10 mg tablet Take  by mouth daily.  albuterol (VENTOLIN HFA) 90 mcg/actuation inhaler Take 2 Puffs by inhalation every six (6) hours as needed for Wheezing. Indications: BRONCHOSPASTIC PULMONARY DISEASE 1 Inhaler 3  DEXILANT 60 mg CpDB Take 60 mg by mouth daily. 2  
 b complex vitamins (B COMPLEX 1) tablet Take 1 Tab by mouth daily.  Cholecalciferol, Vitamin D3, (VITAMIN D3) 1,000 unit cap Take  by mouth.  IBUPROFEN (ADVIL PO) Take  by mouth as needed. Review of Systems: 
HEENT: No epistaxis, no nasal drainage, no difficulty in swallowing, no redness in eyes Respiratory: as above Cardiovascular: no chest pain, no palpitations, no chronic leg edema, no syncope Gastrointestinal: no abd pain, no vomiting, no diarrhea, no bleeding symptoms Genitourinary: No urinary symptoms or hematuria Integument/breast: No ulcers or rashes Musculoskeletal:Neg 
Neurological: No focal weakness, no seizures, no headaches Behvioral/Psych: No anxiety, no depression Constitutional: No fever, no chills, no weight loss, no night sweats Objective:  
 
Visit Vitals /72 (BP 1 Location: Left arm, BP Patient Position: Sitting) Pulse 96 Temp 98.1 °F (36.7 °C) (Oral) Resp 16 Ht 5' 3\" (1.6 m) Wt 64.9 kg (143 lb) SpO2 97% BMI 25.33 kg/m² Physical Exam:  
General: comfortable, no acute distress HEENT: pupils reactive, sclera anicteric, EOM intact Neck: No adenopathy or thyroid swelling, no lymphadenopathy or JVD, supple CVS: S1S2 no murmurs RS: Mod AE bilaterally, few right basal crackles. no tactile fremitus or egophony, no accessory muscle use Abd: soft, non tender, no hepatosplenomegaly Neuro: non focal, awake, alert Extrm: no leg edema, clubbing or cyanosis Skin: no rash Data review:  
 
Office Visit on 09/10/2018 Component Date Value Ref Range Status  TSH 09/10/2018 1.51  0.27 - 4.20 mcU/mL Final  
 T4, Free 09/10/2018 1.1  0.9 - 1.8 ng/dL Final  
 VITAMIN D, 25-HYDROXY 09/10/2018 16.4* 32.0 - 100.0 ng/mL Final  
Hospital Outpatient Visit on 09/06/2018 Component Date Value Ref Range Status  [facility] SPECIMEN COL 09/06/2018 Specimens collected/sent to [facility]    Final  
Orders Only on 09/06/2018 Component Date Value Ref Range Status  Glucose 09/06/2018 143* 70 - 99 mg/dL Final  
 BUN 09/06/2018 16  6 - 22 mg/dL Final  
 Creatinine 09/06/2018 0.7* 0.8 - 1.4 mg/dL Final  
 Sodium 09/06/2018 141  133 - 145 mmol/L Final  
 Potassium 09/06/2018 4.7  3.5 - 5.5 mmol/L Final  
 Chloride 09/06/2018 101  98 - 110 mmol/L Final  
 CO2 09/06/2018 25  20 - 32 mmol/L Final  
 Calcium 09/06/2018 9.4  8.4 - 10.5 mg/dL Final  
 Anion gap 09/06/2018 15.0  mmol/L Final  
 Comment: Test includes BUN, CO2, Chloride, Creatinine, Glucose, Potassium, Calcium and Sodium. Estimated GFR results are reported in mL/min/1.73 sq.m. by the MDRD equation. This eGFR is validated for stable chronic renal failure patients. This  
equation is unreliable in acute illness or patients with normal renal function.  GFRAA 09/06/2018 >60.0  >60.0 Final  
 GFRNA 09/06/2018 >60.0  >60.0 Final  
 Triglyceride 09/06/2018 199* 40 - 149 mg/dL Final  
 HDL Cholesterol 09/06/2018 56  40 - 59 mg/dL Final  
 Cholesterol, total 09/06/2018 155  110 - 200 mg/dL Final  
 CHOLESTEROL/HDL 09/06/2018 2.8  0.0 - 5.0 Final  
 LDL, calculated 09/06/2018 59  50 - 99 mg/dL Final  
 VLDL, calculated 09/06/2018 40* 8 - 30 mg/dL Final  
 Comment: Test includes cholesterol, HDL cholesterol, triglycerides and LDL. Cholesterol Recommended NCEP guidelines in mg/dL: 
Less than 200            Desirable 200 - 239                Borderline High Greater than or  = 240   High Please Note: Total Chol/HDL Ratio Men     Women 1/2 Avg. Risk    3.4     3.3 Avg. Risk    5.0     4.4 
2X  Avg. Risk    9.6     7.1 3X  Avg. Risk   23.4    11.0  Hemoglobin A1c 09/06/2018 7.0* 4.8 - 5.9 % Final  
 AVG GLU 09/06/2018 155* 91 - 123 mg/dL Final  
 
Imaging: 
I have personally reviewed the patients radiographs and have reviewed the reports: CXR 11/27/18- Left lower lobe infiltrate ( my read). XR Results (most recent): 
Results from Hospital Encounter encounter on 04/04/14 XR CHEST PORT Narrative Chest One View CPT CODE: 65881 HISTORY: Tightness of chest 
 
COMPARISON: 02/20/2007 chest radiograph. FINDINGS: 
 
AP portable upright chest.. Mild biapical stable pleural thickening is 
demonstrated. .  The heart is normal in size. The mediastinum appears normal. 
The lateral sulci are sharp. Bone structures appear unremarkable for age. . 
  
 Impression IMPRESSION: 
 
No evidence for active cardiopulmonary disease. IMPRESSION:  
· Bronchiectasis- predominant RLL with expected exacerbations- concerned about acute left lower lobe CAP vs bronchiectasis exacerbation. · GERD 
· DM 
· Osteopenia.   
  
RECOMMENDATIONS:  
 · Will treat with 5 days Augmentin · Continue bronchial hygiene measures · Continue Advair for maintenance and albuterol for rescue · Advised to seek early intervention If symptoms recur · GERD therapy step up · Discussed and answered questions and concerns. · Follow up in 4 months.   
 
  
Lukas Hernandez MD

## 2018-12-12 DIAGNOSIS — G47.00 INSOMNIA, UNSPECIFIED TYPE: ICD-10-CM

## 2018-12-12 RX ORDER — TEMAZEPAM 30 MG/1
30 CAPSULE ORAL
Qty: 30 CAP | Refills: 5 | OUTPATIENT
Start: 2018-12-12 | End: 2019-05-31

## 2018-12-12 NOTE — TELEPHONE ENCOUNTER
PHONE IN [facility] reports the last fill date for Restoril as 11/13/2018. There appears to be no inconsistencies in regards to the prescribing of this medication. Last Visit: 09/10/2018 with MD Landon Lesch  Next Appointment: 01/14/2019 with MD Landon Lesch  Previous Refill Encounter(s): 09/14/2018 per MD Landon Lesch #[id number] with 1 refill    Requested Prescriptions     Pending Prescriptions Disp Refills    temazepam (RESTORIL) 30 mg capsule 30 Cap 1     Sig: Take 1 Cap by mouth nightly as needed for Sleep. Max Daily Amount: 30 mg.

## 2019-01-03 ENCOUNTER — TELEPHONE (OUTPATIENT)
Dept: INTERNAL MEDICINE CLINIC | Age: 69
End: 2019-01-03

## 2019-01-03 DIAGNOSIS — E61.1 IRON DEFICIENCY: Primary | ICD-10-CM

## 2019-01-03 DIAGNOSIS — E55.9 VITAMIN D DEFICIENCY: ICD-10-CM

## 2019-01-09 ENCOUNTER — HOSPITAL ENCOUNTER (OUTPATIENT)
Dept: LAB | Age: 69
Discharge: HOME | End: 2019-01-09

## 2019-01-09 LAB — SENTARA SPECIMEN COL,SENBCF: NORMAL

## 2019-01-09 PROCEDURE — 99001 SPECIMEN HANDLING PT-LAB: CPT

## 2019-01-09 NOTE — PROGRESS NOTES
79 y.o. WHITE OR  female who presents for f/u She continues to tamox for RIGHT breast ca being followed by Dr Michelle Hameed She is trying to stay on her 10,000 steps a day. No cardiovascular complaints. She was complaining of palpitations at the last visit, ended up seeing Dr. Carina Blanchard and a 30-day monitor came back negative outside of sinus tachycardia. Denies polyuria, polydipsia, nocturia, vision change. She's on [place] and [place] and no hypoglycemia episodes. She is getting sugars in the 200 range mostly. No success with attempts at weight loss. She is reluctant to try intermittent fasting due to concerns about hypoglycemia. She is requesting to be started on phentermine to try to suppress the appetite, 1 of her friends has succeeded well on that. Her aches and pains have improved markedly since starting vitamin D supplementation Remains on PPI and iron supplementation, no GI complaints to report LAST MEDICARE WELLNESS EXAM: never as not medicare b Past Medical History:  
Diagnosis Date  ADD (attention deficit disorder) 2014 [facility] psych  Amebic colitis 1970s  Arthritis s/p cortisone left knee  Bronchiectasis Dr. Debra Mary; RLL  
 Bronchiectasis [facility])  Diabetes mellitus ([facility] [address] 09/2013 on basis of elev a1c  
 FHx: breast cancer  Fibrocystic breast disease Dr. Looney Shelter  GERD (gastroesophageal reflux disease) Dr. Jono Chao s/p dilation 2/12  
 H/O pulmonary function tests 2011  
 ratio 70, FEV1 81 no change postbd, TLC 93, RV 94, DLCO 81  
 History of right breast cancer 02/2016 Dr Michelle Hameed; RIGHT partial mastectomy, adjuvant XRT  Hyperlipidemia  Hypovitaminosis D   
 Insomnia  Iron deficiency anemia 05/2018  
 eval Dr Jono Chao showed gastritis  Left rotator cuff tear 2000s conservative therapy  Osteopenia  DEXA t-score -1.2 spine, -0.2 hip (4/08);  spine -1.2, hip 0.1 (10/10); -1.8 spine, -0.3 FRAX 6.3/0.2 (); [facility] -1.7 spine, 0.1 hip ()  Overweight (BMI 25.0-29. 9) IF  but unwilling to do; phentermine  start weight 145 lbs  Spontaneous pneumothorax 1970s x3  Zoster right S1 3/12 Past Surgical History:  
Procedure Laterality Date  BREAST SURGERY PROCEDURE UNLISTED   Left nipple duct exploration and excisional biopsy  CARDIAC SURG PROCEDURE UNLIST   Thallim negative, ef 75%  HX BREAST BIOPSY   Right Breast biopsy w/needle  HX BREAST LUMPECTOMY Right 2017  HX COLONOSCOPY Dr. Ricardo Nobles negative ; diverticulosis  Dr Sylvie Cowden; Dr Ricardo Nobles 17 neg  HX ENDOSCOPY  2018 Dr Ricardo Nobles; gastropathy, h pylori neg  HX HYSTERECTOMY    
 fibroids/ovarian cysts - Dr. Bindu Frey  HX ORTHOPAEDIC  2017 Dr Shoshana Montgomery; left radial fx, left scaphoid fx after fall Social History Socioeconomic History  Marital status:  Spouse name: Not on file  Number of children: 2  
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  
  Employer: [employer] Tobacco Use  Smoking status: Former Smoker Packs/day: 1.00 Years: 5.00 Pack years: 5.00 Last attempt to quit: 1973 Years since quittin.4  Smokeless tobacco: Never Used Substance and Sexual Activity  Alcohol use: No  
 Drug use: No  
 Sexual activity: Not on file Other Topics Concern  Not on file Social History Narrative  Not on file Current Outpatient Medications Medication Sig  
  pravastatin (PRAVACHOL) 40 mg tablet TAKE 1 TAB BY MOUTH BY MOUTH NIGHTLY.  phentermine (ADIPEX-P) 37.5 mg tablet Take 1 Tab by mouth every morning. Max Daily Amount: 37.5 mg.  
 temazepam (RESTORIL) 30 mg capsule Take 1 Cap by mouth nightly as needed for Sleep. Max Daily Amount: 30 mg.  
 JANUVIA 100 mg tablet TAKE 1 TABLET BY MOUTH DAILY.  ergocalciferol (ERGOCALCIFEROL) 50,000 unit capsule Take 1 Cap by mouth every seven (7) days.  ADVAIR DISKUS 250-50 mcg/dose diskus inhaler TAKE 1 PUFF BY INHALATION TWO (2) TIMES A DAY. (Patient taking differently: TAKE 1 PUFF BY INHALATION as needed)  tamoxifen (NOLVADEX) 10 mg tablet Take  by mouth daily.  albuterol (VENTOLIN HFA) 90 mcg/actuation inhaler Take 2 Puffs by inhalation every six (6) hours as needed for Wheezing. Indications: BRONCHOSPASTIC PULMONARY DISEASE  DEXILANT 60 mg CpDB Take 60 mg by mouth daily.  b complex vitamins (B COMPLEX 1) tablet Take 1 Tab by mouth daily.  Cholecalciferol, Vitamin D3, (VITAMIN D3) 1,000 unit cap Take  by mouth. No current facility-administered medications for this visit. Allergies Allergen Reactions  Actos [Pioglitazone] Other (comments) Felt bad, weight gain  Avelox [Moxifloxacin] Other (comments) Felt jittery  Crestor [Rosuvastatin] Myalgia  Evista [Raloxifene] Other (comments) Severe flashes  Lipitor [Atorvastatin] Other (comments) Leg cramps and muscle aches  Macrobid [Nitrofurantoin Monohyd/M-Cryst] Hives  Metformin Other (comments) Gi intolerance  Neuromuscular Blockers, Steroidal Other (comments) \"patient feels crazy\"  Nsaids (Non-Steroidal Anti-Inflammatory Drug) Other (comments) H/o gastropathy REVIEW OF SYSTEMS: gyn 2012, mammo 12/16, DEXA 4/17, colo 12/13 Dr Milka Godfrey, Dr Jose Gottlieb Ophtho  no vision change or eye pain Oral  no mouth pain, tongue or tooth problems Ears  no hearing loss, ear pain, fullness, no swallowing problems Cardiac  no CP, PND, orthopnea, edema, palpitations or syncope Chest  no breast masses Resp  no wheezing, chronic coughing, dyspnea GI  no heartburn, nausea, vomiting, change in bowel habits, bleeding, hemorrhoids Urinary  no dysuria, hematuria, flank pain, urgency, frequency Constitutional  no wt loss, night sweats, unexplained fevers Neuro  no focal weakness, numbness, paresthesias, incoordination, ataxia, involuntary movements Endo - no polyuria, polydipsia, nocturia, hot flashes Visit Vitals /84 Pulse 78 Temp 98 °F (36.7 °C) (Oral) Resp 14 Ht 5' 3\" (1.6 m) Wt 145 lb (65.8 kg) SpO2 99% BMI 25.69 kg/m² A&O x3 Affect is appropriate. Mood stable No apparent distress Anicteric, no JVD, adenopathy or thyromegaly. No carotid bruits or radiated murmur Lungs clear to auscultation, no wheezes or rales Heart showed regular rate and rhythm. No murmur, rubs, gallops Abdomen soft nontender, no hepatosplenomegaly or masses. Ext without c/c/e 
 
LABS From 9/10 showed   gluc 106, cr 0.70, gfr>60, alt 42,                   chol 238, tg 154, hdl 54, ldl-c 153, wbc 6.5, hb 13.1, plt 258, ua neg From 2/11 showed   gluc 95,   cr 0.90                                                                                                   wbc 7.0, hb 14.1, plt 308 From 3/12 showed   gluc 103, cr 0.90              alt 43,                                                                           wbc 6.2, hb 13.7, plt 285 From 3/13 showed   gluc 130, cr 0.60,             alt 22,                    chol 244, tg 180, hdl 57, ldl-c 151,                                          ua neg, tsh 1.79 From 9/13 showed   gluc 119, cr 0.70, gfr>60,            hba1c 6.6, chol 228, tg 145, hdl 62, ldl-c 137, 2hr  From 2/14 showed   gluc 95,   cr 0.60, gfr>61, alt 20, hba1c 5.7,                    umar 2.8 From 4/14 showed   gluc 144, cr 0.98, gfr>60, alt 31,          wbc 7.6, hb 13.4, plt 317, ck/trop neg From 10/14 showed gluc 98,   cr 0.60, gfr>60, alt 20,          chol 163, tg 96,   hdl 64, ldl-c 80 From 4/15 showed   gluc 96,   cr 0.70, gfr>60, alt 19, hba1c 6.1, chol 207, tg 126, hdl 68, ldl-c 114 From 10/15 showed        hba1c 6.3, chol 188, tg 118, hdl 66, ldl-c 98,   wbc 5.3, hb 13.3, plt 287, umar neg From 4/16 showed   gluc 98,   cr 0.60, gfr>60, alt 19, hba1c 6.3, chol 170, tg 141, hdl 57, ldl-c 85 From 10/16 showed gluc 137, cr 0.50, gfr>60, alt 17, hba1c 6.2, chol 176, tg 302, hdl 54, ldl-c 62,   wbc 8.9, hb 12.3, plt 393, umar neg From 1/17 showed   gluc 114, cr 0.60, gfr>60,           wbc 6.1, hb 12.9, plt 356 From 4/17 showed        hba1c 6.8, chol 223, tg 240, hdl 53, ldl-c 122 From 9/17 showed   gluc 109, cr 0.70, gfr>60, alt 16, hba1c 6.7,                  umar neg From 1/18 showed   gluc 104, cr 0.60, gfr>60, alt 14, hba1c 6.8, chol 154, tg 197, hdl 60, ldl-c 55 From 5/18 showed        hba1c 7.0,         wbc 5.7, hb 11.3, plt 281, umar neg, fe 43, %sat 11, ferritin 7, b12 588, fol 18.6, spep neg, retic 1.6 From 9/18 showed   gluc 143, cr 0.70, gfr>60,    hba1c 7.0, chol 155, tg 199, hdl 56, ldl-c 59 Results for orders placed or performed in visit on 01/09/19 FERRITIN Result Value Ref Range Ferritin 47 10 - 291 ng/mL IRON PROFILE Result Value Ref Range Iron 80 30 - 160 mcg/dL UIBC 204 110 - 370 mcg/dL TIBC 284 228 - 428 mcg/dL Iron % saturation 28 20 - 50 % VITAMIN D, 25 HYDROXY Result Value Ref Range VITAMIN D, 25-HYDROXY 37.1 32.0 - 100.0 ng/mL Patient Active Problem List  
Diagnosis Code  Bronchiectasis Dr. Nilam Sen J47.9  Osteopenia  M85.80  Hyperlipidemia E78.5  Gastroesophageal reflux disease without esophagitis K21.9  
 Uncontrolled type 2 diabetes mellitus E11.65  
 History of breast cancer Z85.3  Vitamin D deficiency E55.9  Overweight (BMI 25.0-29. 9) E66.3 Assessment and plan: 1. Pulm. F/U Dr Ekaterina Costello and continue current 2. GERD. Continue ppi and avoidance measures 3. Osteopenia. Ca/d, wt bearing exercises 4. DM. Continue januvia for now, she will get A1c checked next week 5. Lipids. Continue current regimen. 6  Breast ca. Per Dr Fidencio Hernández 7. Iron def anemia. Continue supplementation and recheck levels in next visit 8. Overweight. Trial of phentermine. We did also talk about the later generation products like qsymia, etc.  She is concerned about the cost so prefers the generic 9. Pvx-23 given RTC 5/19 Above conditions discussed at length and patient vocalized understanding. All questions answered to patient satisfaction

## 2019-01-10 LAB — 25(OH)D3 SERPL-MCNC: 37.1 NG/ML (ref 32–100)

## 2019-01-11 LAB
FE % SATURATION,PSAT: 28 % (ref 20–50)
FERRITIN SERPL-MCNC: 47 NG/ML (ref 10–291)
IRON,IRN: 80 MCG/DL (ref 30–160)
TIBC,TIBC: 284 MCG/DL (ref 228–428)
UIBC SERPL-MCNC: 204 MCG/DL (ref 110–370)

## 2019-01-14 ENCOUNTER — OFFICE VISIT (OUTPATIENT)
Dept: INTERNAL MEDICINE CLINIC | Age: 69
End: 2019-01-14

## 2019-01-14 VITALS
HEIGHT: 63 IN | TEMPERATURE: 98 F | WEIGHT: 145 LBS | RESPIRATION RATE: 14 BRPM | BODY MASS INDEX: 25.69 KG/M2 | OXYGEN SATURATION: 99 % | SYSTOLIC BLOOD PRESSURE: 122 MMHG | DIASTOLIC BLOOD PRESSURE: 84 MMHG | HEART RATE: 78 BPM

## 2019-01-14 DIAGNOSIS — E66.3 OVERWEIGHT (BMI 25.0-29.9): ICD-10-CM

## 2019-01-14 DIAGNOSIS — E78.5 HYPERLIPIDEMIA, UNSPECIFIED HYPERLIPIDEMIA TYPE: ICD-10-CM

## 2019-01-14 DIAGNOSIS — K21.9 GASTROESOPHAGEAL REFLUX DISEASE WITHOUT ESOPHAGITIS: ICD-10-CM

## 2019-01-14 DIAGNOSIS — Z23 ENCOUNTER FOR IMMUNIZATION: Primary | ICD-10-CM

## 2019-01-14 DIAGNOSIS — E55.9 VITAMIN D DEFICIENCY: ICD-10-CM

## 2019-01-14 DIAGNOSIS — Z85.3 HISTORY OF BREAST CANCER: ICD-10-CM

## 2019-01-14 RX ORDER — PRAVASTATIN SODIUM 40 MG/1
TABLET ORAL
Qty: 90 TAB | Refills: 3 | Status: SHIPPED | OUTPATIENT
Start: 2019-01-14 | End: 2020-01-08

## 2019-01-14 RX ORDER — PHENTERMINE HYDROCHLORIDE 37.5 MG/1
37.5 TABLET ORAL
Qty: 30 TAB | Refills: 2 | OUTPATIENT
Start: 2019-01-14 | End: 2019-05-31 | Stop reason: SINTOL

## 2019-01-14 NOTE — PROGRESS NOTES
1. Have you been to the ER, urgent care clinic or hospitalized since your last visit? NO.  
 
2. Have you seen or consulted any other health care providers outside of the [address] [place] [address] since your last visit (Include any pap smears or colon screening)? NO Do you have an Advanced Directive? NO Would you like information on Advanced Directives? NO Chief Complaint Patient presents with  Vitamin D Deficiency 4 month follow up with labs Ashly Devine is a 79 y.o. female who presents for routine immunizations. Per vo from [address]. Pneumovax 23 given in right deltoid. She denies any symptoms , reactions or allergies that would exclude them from being immunized today. Risks and adverse reactions were discussed and the VIS was given to them. All questions were addressed. She was observed for 10 min post injection. There were no reactions observed.  
 
Liana De La Torre LPN

## 2019-01-14 NOTE — PATIENT INSTRUCTIONS
Vaccine Information Statement Pneumococcal Polysaccharide Vaccine: What You Need to Know Many Vaccine Information Statements are available in [place] and other languages. See www.immunize.org/vis. Hojas de información Sobre Vacunas están disponibles en español y en muchos otros idiomas. Visite [email or web address]. 1. Why get vaccinated? Vaccination can protect older adults (and some children and younger adults) from pneumococcal disease. Pneumococcal disease is caused by bacteria that can spread from person to person through close contact. It can cause ear infections, and it can also lead to more serious infections of the: 
 Lungs (pneumonia),  Blood (bacteremia), and 
 Covering of the brain and spinal cord (meningitis). Meningitis can cause deafness and brain damage, and it can be fatal.   
 
Anyone can get pneumococcal disease, but children under 3years of age, people with certain medical conditions, adults over 72years of age, and cigarette smokers are at the highest risk. About 18,000 older adults die each year from pneumococcal disease in the [place]. Treatment of pneumococcal infections with penicillin and other drugs used to be more effective. But some strains of the disease have become resistant to these drugs. This makes prevention of the disease, through vaccination, even more important. 2. Pneumococcal polysaccharide vaccine (PPSV23) Pneumococcal polysaccharide vaccine (PPSV23) protects against 23 types of pneumococcal bacteria. It will not prevent all pneumococcal disease. PPSV23 is recommended for:  All adults 72years of age and older,  Anyone 2 through 59years of age with certain long-term health problems, 
 Anyone 2 through 59years of age with a weakened immune system, 
 Adults 23 through 59years of age who smoke cigarettes or have asthma. Most people need only one dose of PPSV.   A second dose is recommended for certain high-risk groups. People 72 and older should get a dose even if they have gotten one or more doses of the vaccine before they turned 65. Your healthcare provider can give you more information about these recommendations. Most healthy adults develop protection within 2 to 3 weeks of getting the shot. 3. Some people should not get this vaccine  Anyone who has had a life-threatening allergic reaction to PPSV should not get another dose.  Anyone who has a severe allergy to any component of PPSV should not receive it. Tell your provider if you have any severe allergies.  Anyone who is moderately or severely ill when the shot is scheduled may be asked to wait until they recover before getting the vaccine. Someone with a mild illness can usually be vaccinated.  Children less than 3years of age should not receive this vaccine.  There is no evidence that PPSV is harmful to either a pregnant woman or to her fetus. However, as a precaution, women who need the vaccine should be vaccinated before becoming pregnant, if possible. 4. Risks of a vaccine reaction With any medicine, including vaccines, there is a chance of side effects. These are usually mild and go away on their own, but serious reactions are also possible. About half of people who get PPSV have mild side effects, such as redness or pain where the shot is given, which go away within about two days. Less than 1 out of 100 people develop a fever, muscle aches, or more severe local reactions. Problems that could happen after any vaccine:  People sometimes faint after a medical procedure, including vaccination. Sitting or lying down for about 15 minutes can help prevent fainting, and injuries caused by a fall. Tell your doctor if you feel dizzy, or have vision changes or ringing in the ears.  
 
 Some people get severe pain in the shoulder and have difficulty moving the arm where a shot was given. This happens very rarely.  Any medication can cause a severe allergic reaction. Such reactions from a vaccine are very rare, estimated at about 1 in a million doses, and would happen within a few minutes to a few hours after the vaccination. As with any medicine, there is a very remote chance of a vaccine causing a serious injury or death. The safety of vaccines is always being monitored. For more information, visit: www.cdc.gov/vaccinesafety/  
 
5. What if there is a serious reaction? What should I look for? Look for anything that concerns you, such as signs of a severe allergic reaction, very high fever, or unusual behavior. Signs of a severe allergic reaction can include hives, swelling of the face and throat, difficulty breathing, a fast heartbeat, dizziness, and weakness. These would usually start a few minutes to a few hours after the vaccination. What should I do? If you think it is a severe allergic reaction or other emergency that cant wait, call 9-1-1 or get to the nearest hospital. Otherwise, call your doctor. Afterward, the reaction should be reported to the Vaccine Adverse Event Reporting System (VAERS). Your doctor might file this report, or you can do it yourself through the VAERS web site at www.vaers. hhs.gov, or by calling 8-259.157.8243. VAERS does not give medical advice. 6. How can I learn more?  Ask your doctor. He or she can give you the vaccine package insert or suggest other sources of information.  Call your local or state health department.  Contact the Centers for Disease Control and Prevention (CDC): 
- Call 2-289.546.3955 (1-800-CDC-INFO) or 
- Visit CDCs website at www.cdc.gov/vaccines Vaccine Information Statement PPSV  
04/24/2015 Department of [facility] and [employer] Centers for Disease Control and Prevention Office Use Only

## 2019-01-16 ENCOUNTER — TELEPHONE (OUTPATIENT)
Dept: INTERNAL MEDICINE CLINIC | Age: 69
End: 2019-01-16

## 2019-01-16 NOTE — TELEPHONE ENCOUNTER
Call pt pls  No direct substitute in class - dalmane is too long acting  She can try valium?  probably work just as well

## 2019-01-16 NOTE — TELEPHONE ENCOUNTER
Per fax: product Temazepam 30 MG is on backorder. Please submit new rx  For an alternative. Insurance will not cover 2 of the 15 mg without a PA.

## 2019-01-16 NOTE — TELEPHONE ENCOUNTER
Pt stated she will try Valium please    Also, [id number], patient stated she was told to come back and get her A1C checked at some point via finger stick. Are we adding this to her visit from Monday? She said she could come tomorrow.

## 2019-01-17 LAB — HBA1C MFR BLD HPLC: 6.9 %

## 2019-01-18 ENCOUNTER — TELEPHONE (OUTPATIENT)
Dept: INTERNAL MEDICINE CLINIC | Age: 69
End: 2019-01-18

## 2019-01-18 DIAGNOSIS — F41.9 ANXIETY: ICD-10-CM

## 2019-01-18 DIAGNOSIS — M25.50 ARTHRALGIA, UNSPECIFIED JOINT: Primary | ICD-10-CM

## 2019-01-18 NOTE — TELEPHONE ENCOUNTER
Pt stated she was suppose to have a script for valium called in and she has ran out of current med she was taken.              Adipex p [facility]

## 2019-01-18 NOTE — TELEPHONE ENCOUNTER
Advised patient the tramadol has not been sent into pharmacy yet      Called and canceled phentermine was unaware script was already called in.

## 2019-01-21 RX ORDER — TRAMADOL HYDROCHLORIDE 50 MG/1
50 TABLET ORAL
Qty: 60 TAB | Refills: 0 | Status: SHIPPED | OUTPATIENT
Start: 2019-01-21 | End: 2019-05-31

## 2019-01-22 ENCOUNTER — TELEPHONE (OUTPATIENT)
Dept: INTERNAL MEDICINE CLINIC | Age: 69
End: 2019-01-22

## 2019-01-23 NOTE — TELEPHONE ENCOUNTER
Pt states that she does not have any pain and doesn't want the tramadol, she wants the valium instead. Please advise.

## 2019-01-23 NOTE — TELEPHONE ENCOUNTER
Pt calling asking about the rx Dr QUARLES was going to send for Vailum please call her back at   [phone]

## 2019-01-23 NOTE — TELEPHONE ENCOUNTER
Pt is questioning if Valium was being called into her pharmacy. Please advise. It seems she is confused on what medications she is supposed to be receiving. Please call her with updated info once you find out.

## 2019-01-24 RX ORDER — DIAZEPAM 5 MG/1
5 TABLET ORAL
Qty: 40 TAB | Refills: 1 | OUTPATIENT
Start: 2019-01-24 | End: 2019-04-01 | Stop reason: SDUPTHER

## 2019-02-21 ENCOUNTER — OFFICE VISIT (OUTPATIENT)
Dept: INTERNAL MEDICINE CLINIC | Age: 69
End: 2019-02-21

## 2019-02-21 VITALS
HEIGHT: 63 IN | BODY MASS INDEX: 25.87 KG/M2 | SYSTOLIC BLOOD PRESSURE: 126 MMHG | OXYGEN SATURATION: 96 % | WEIGHT: 146 LBS | RESPIRATION RATE: 16 BRPM | TEMPERATURE: 99.4 F | HEART RATE: 100 BPM | DIASTOLIC BLOOD PRESSURE: 66 MMHG

## 2019-02-21 DIAGNOSIS — J11.1 INFLUENZA: ICD-10-CM

## 2019-02-21 DIAGNOSIS — J40 BRONCHITIS: ICD-10-CM

## 2019-02-21 DIAGNOSIS — R05.9 COUGH: Primary | ICD-10-CM

## 2019-02-21 DIAGNOSIS — R52 BODY ACHES: ICD-10-CM

## 2019-02-21 RX ORDER — AZITHROMYCIN 250 MG/1
TABLET, FILM COATED ORAL
Qty: 6 TAB | Refills: 0 | Status: SHIPPED | OUTPATIENT
Start: 2019-02-21 | End: 2019-02-26

## 2019-02-21 NOTE — PROGRESS NOTES
Chele Regina presents today for   Chief Complaint   Patient presents with    Flu Like Symptoms     Patient reports body aches, and cough x 4 days. Coordination of Care:  1. Have you been to the ER, urgent care clinic since your last visit? Hospitalized since your last visit? no    2. Have you seen or consulted any other health care providers outside of the [address] [place] [address] since your last visit? Include any pap smears or colon screening.  no

## 2019-03-02 NOTE — PROGRESS NOTES
HPI     Domo Kolb is a 79 y.o. female with relevant past medical history of breast cancer on chemotherapy (tamoxifen), ADD, OA, GERD, HLD, MD2, VZV, remote h/o spontaneous pneumothorax x 3, amebic colitis and bronchiectasis- following with Dr. To Preciado (pulmonology), per patient with h/o recurrent lung infections. Here for evaluation of flu-like symptoms for over 4 days. The patient reports malaise, body aches, headaches, feverish, and cough for about 4 days now. Denies any dizziness, CP, N/V/D. Denies any known close sick contacts or recent travel. Non smoker currently. No influenza vaccine for this season on chart. ROS  As above included in HPI. Otherwise 11 point review of systems negative including constitutional, skin, HENT, eyes, respiratory, cardiovascular, gastrointestinal, genitourinary, musculoskeletal, endocrine, hematologic, allergy, and neurologic. Past Medical History  Past Medical History:   Diagnosis Date    ADD (attention deficit disorder) 2014    [facility]    Amebic colitis 1970s    Arthritis     s/p cortisone left knee    Bronchiectasis     Dr. To Preciado; RLL    Bronchiectasis ([address]     Diabetes mellitus ([address] 09/2013    on basis of elev a1c    FHx: breast cancer     Fibrocystic breast disease     Dr. Alexandra Mathews GERD (gastroesophageal reflux disease)     Dr. Chet Johnson s/p dilation 2/12    H/O pulmonary function tests 2011    ratio 70, FEV1 81 no change postbd, TLC 93, RV 94, DLCO 81    History of right breast cancer 02/2016    Dr Geovanna Lawton; RIGHT partial mastectomy, adjuvant XRT    Hyperlipidemia     Hypovitaminosis D     Insomnia     Iron deficiency anemia 05/2018    eval Dr Chet Johnson showed gastritis    Left rotator cuff tear 2000s    conservative therapy    Osteopenia     DEXA t-score -1.2 spine, -0.2 hip (4/08);  spine -1.2, hip 0.1 (10/10);  -1.8 spine, -0.3 FRAX 6.3/0.2 (7/13); [facility] -1.7 spine, 0.1 hip (4/17)    Overweight (BMI 25.0-29. 9)     IF 5/18 but unwilling to do; phentermine 1/19 start weight 145 lbs    Spontaneous pneumothorax 1970s    x3    Zoster right S1 3/12     Past Surgical History:   Procedure Laterality Date    BREAST SURGERY PROCEDURE UNLISTED  2/11    Left nipple duct exploration and excisional biopsy    CARDIAC SURG PROCEDURE UNLIST  4/14    NST negative, ef 75%    CARDIAC SURG PROCEDURE UNLIST  10/2018    Dr Miguel Posada; 30d monitor neg    HX BREAST BIOPSY  4/12    Right Breast biopsy w/needle    HX BREAST LUMPECTOMY Right 02/14/2017    HX COLONOSCOPY      Dr. Soto Em negative 2005; diverticulosis 12/13 Dr Yenni Bravo; Dr Soto Em 11/27/17 neg    HX ENDOSCOPY  07/2018    Dr Soto Em; gastropathy, h pylori neg    HX HYSTERECTOMY  1993    fibroids/ovarian cysts - Dr. Garvey [facility]TH[facility]EDIC  03/2017    Dr Kiley Pack; left radial fx, left scaphoid fx after fall        Family History  Family History   Problem Relation Age of Onset    Breast Cancer Mother     Ovarian Cancer Mother     Diabetes Father     Stroke Father     Diabetes Sister     Breast Cancer Maternal Grandmother     Breast Cancer Paternal Grandmother     Breast Cancer Paternal Aunt        Social History  She  reports that she quit smoking about 45 years ago. She has a 5.00 pack-year smoking history.  she has never used smokeless tobacco.   Social History     Substance and Sexual Activity   Alcohol Use No       Immunization History  Immunization History   Administered Date(s) Administered    Influenza High Dose Vaccine PF 10/21/2017    Influenza Vaccine 10/23/2014, 10/01/2016    Influenza Vaccine (Quad) PF 10/30/2015    Influenza Vaccine PF 10/02/2013    Influenza Vaccine Split 01/16/2012, 11/28/2012    Influenza Vaccine Whole 09/22/2010    Pneumococcal Conjugate (PCV-13) 10/25/2016    Pneumococcal Polysaccharide (PPSV-23) 01/14/2019    TB Skin Test (PPD) 08/01/2009    Tdap 02/12/2017    ZZZ-RETIRED (DO NOT USE) Pneumococcal Vaccine (Unspecified Type) 01/01/2008 Allergies  Allergies   Allergen Reactions    Actos [Pioglitazone] Other (comments)     Felt bad, weight gain    Avelox [Moxifloxacin] Other (comments)     Felt jittery    Crestor [Rosuvastatin] Myalgia    Evista [Raloxifene] Other (comments)     Severe flashes    Lipitor [Atorvastatin] Other (comments)     Leg cramps and muscle aches    Macrobid [Nitrofurantoin Monohyd/M-Cryst] Hives    Metformin Other (comments)     Gi intolerance    Neuromuscular Blockers, Steroidal Other (comments)     \"patient feels crazy\"    Nsaids (Non-Steroidal Anti-Inflammatory Drug) Other (comments)     H/o gastropathy       Medications  Current Outpatient Medications   Medication Sig    diazePAM (VALIUM) 5 mg tablet Take 1 Tab by mouth every twelve (12) hours as needed for Anxiety. Max Daily Amount: 10 mg.    traMADol (ULTRAM) 50 mg tablet Take 1 Tab by mouth every six (6) hours as needed for Pain. Max Daily Amount: 200 mg.  pravastatin (PRAVACHOL) 40 mg tablet TAKE 1 TAB BY MOUTH BY MOUTH NIGHTLY.  phentermine (ADIPEX-P) 37.5 mg tablet Take 1 Tab by mouth every morning. Max Daily Amount: 37.5 mg.    temazepam (RESTORIL) 30 mg capsule Take 1 Cap by mouth nightly as needed for Sleep. Max Daily Amount: 30 mg.    JANUVIA 100 mg tablet TAKE 1 TABLET BY MOUTH DAILY.  ergocalciferol (ERGOCALCIFEROL) 50,000 unit capsule Take 1 Cap by mouth every seven (7) days.  ADVAIR DISKUS 250-50 mcg/dose diskus inhaler TAKE 1 PUFF BY INHALATION TWO (2) TIMES A DAY. (Patient taking differently: TAKE 1 PUFF BY INHALATION as needed)    tamoxifen (NOLVADEX) 10 mg tablet Take  by mouth daily.  albuterol (VENTOLIN HFA) 90 mcg/actuation inhaler Take 2 Puffs by inhalation every six (6) hours as needed for Wheezing. Indications: BRONCHOSPASTIC PULMONARY DISEASE    DEXILANT 60 mg CpDB Take 60 mg by mouth daily.  b complex vitamins (B COMPLEX 1) tablet Take 1 Tab by mouth daily.     Cholecalciferol, Vitamin D3, (VITAMIN D3) 1,000 unit cap Take  by mouth. No current facility-administered medications for this visit. Visit Vitals  /66   Pulse 100   Temp 99.4 °F (37.4 °C) (Oral)   Resp 16   Ht 5' 3\" (1.6 m)   Wt 146 lb (66.2 kg)   SpO2 96%   BMI 25.86 kg/m²     Body mass index is 25.86 kg/m². Physical Exam   Constitutional: She is well-developed, well-nourished, and in no distress. No distress. HENT:   Head: Normocephalic and atraumatic. Right Ear: External ear normal.   Left Ear: External ear normal.   Congested and erythematous nasopharynx. No exudates found. Eyes: Conjunctivae and EOM are normal. Pupils are equal, round, and reactive to light. Neck: Normal range of motion. Neck supple. Cardiovascular: Normal rate and regular rhythm. Pulmonary/Chest: Effort normal and breath sounds normal.   Musculoskeletal: She exhibits no edema. Lymphadenopathy:     She has no cervical adenopathy. Skin: She is not diaphoretic. Nursing note and vitals reviewed. REVIEW OF DATA    Labs  No visits with results within 1 Month(s) from this visit. Latest known visit with results is:   Office Visit on 01/14/2019   Component Date Value Ref Range Status    Hemoglobin A1c (POC) 01/17/2019 6.9  % Final         CT Results (most recent):  Results from [place] encounter on 06/19/18   CT HEAD WO CONT    Narrative   CT scan of head, without contrast:        Indication:    Trauma due to fall and head injury. Evaluation for possible intracranial  hemorrhages. TECHNIQUE:    Contiguous 5 mm thick axial sections of brain have been obtained without  intravenous contrast.      [2-D coronal and sagittal images reformatted.]     The study has been performed utilizing appropriate radiation dose reduction  technique according to specification of the scanner, with modification of MAA/KV  and appropriate collimation adjusted to patient's body habitus.       COMPARISON STUDY: [CT scan of head on 12/01/2006.]      -------------------------------------      FINDINGS:        Intracranial hemorrhage :[None.]    Intracranial mass lesion:[ None.]    Midline shift: [None.]    Cerebral cortical atrophy:[ Mild cortical atrophy in the frontal lobes  bilaterally. ]. Cerebral central atrophy:[ None.]    Chronic microvascular ischemic changes/aging changes in white matter:[ None.]    Acute cortical infarction:[ None.]    Old cerebral infarction: [None.]    Basal ganglia structures of both sides:[ No diagnostic finding.] At the inferior  aspect of left basal ganglia structures there is a hypodense focus, 8 mm in  diameter, most likely to be focally prominent perivascular space, as also noted  previously in 2006. Bilateral thalami: No focal abnormality. Brainstem:[ No diagnostic  finding]    Cerebellum: No diagnostic finding. Calvarium and base of skull:[ No fracture or focal lesion]. In visualized portions of both orbits:[ No diagnostic finding.]    In visualized portions of paranasal sinuses:[ No diagnostic finding.]    In visualized base of l skull:[ No diagnostic finding.]    No obvious interval changes, as compared to previous study. Impression IMPRESSION:    No evidence of intracranial hemorrhages or any other definable acute  intracranial abnormality. No evidence of fracture in skull. XR Results (most recent):  Results from Appointment encounter on 11/27/18   XR CHEST PA LAT    Narrative EXAM:  Chest Frontal and Lateral.    INDICATION:  Chest pain on breathing. COMPARISON:  Plain films 4/4/14, 2/28/08. CT 2/15/18    TECHNIQUE:  PA and lateral views. FINDINGS:      - A questionable 1.7 x 1.1 cm potential nodular density is observed in the left  lower lung zone abutting the left cardiac border, seen only on the frontal view. No corresponding density apparent on the lateral view.   Favor focal scar tissue  at the confluence of the cardiac border, fissural pleural thickening and  pericardial fat pad margin. Notably, this density was not as apparent on  4/4/14. A much larger density was present on 2/28/07, measuring about 4.4 x 4.7  cm which probably was related to focal consolidative disease process. - The remainder of the lungs appear unremarkable. Chronic apical pleural  thickening. No pleural effusion or pneumothorax. No significant  cardiomediastinal silhouette enlargement. Impression IMPRESSION:    1. Questionable potential nodular density seen only on the frontal view. Most  likely an artifact related to scar tissue or confluence of multiple densities. A short-term interval follow-up may be considered in 2-3 months to ascertain  stability. Alternatively, shallow oblique views could be helpful as well. 2.  No acute air space disease in the remainder of the study. 3.  Pericardial fat pad adjacent to the cardiac apex. CT   All Micro Results     None              DIAGNOSIS AND PLAN  Patient Active Problem List   Diagnosis Code    Bronchiectasis Dr. Darrick Ray J47.9    Osteopenia  M85.80    Hyperlipidemia E78.5    Gastroesophageal reflux disease without esophagitis K21.9    Uncontrolled type 2 diabetes mellitus E11.65    History of breast cancer Z85.3    Vitamin D deficiency E55.9    Overweight (BMI 25.0-29. 9) E66.3     1. Cough  2. Body aches  3. Influenza-like symptoms  4. Bronchitis     Off window for Tamiflu. Patient agress to take azithromycin for bronchitis as a possible complication of influenza. Supportive care with rest, fluids, avoid work for 5 days to prevent spread. Acetaminophen PRN. Azithromycin course as prescribed. Call back in case symptoms worsen or do not improve within 72 hours. Follow-up Disposition: Not on File     Felipa William MD

## 2019-03-10 DIAGNOSIS — D50.9 IRON DEFICIENCY ANEMIA, UNSPECIFIED IRON DEFICIENCY ANEMIA TYPE: ICD-10-CM

## 2019-04-01 DIAGNOSIS — F41.9 ANXIETY: ICD-10-CM

## 2019-04-03 RX ORDER — DIAZEPAM 5 MG/1
TABLET ORAL
Qty: 40 TAB | Refills: 1 | Status: SHIPPED | OUTPATIENT
Start: 2019-04-03 | End: 2019-06-28 | Stop reason: SDUPTHER

## 2019-04-04 ENCOUNTER — TELEPHONE (OUTPATIENT)
Dept: INTERNAL MEDICINE CLINIC | Age: 69
End: 2019-04-04

## 2019-05-30 ENCOUNTER — HOSPITAL ENCOUNTER (OUTPATIENT)
Dept: LAB | Age: 69
Discharge: HOME | End: 2019-05-30

## 2019-05-30 LAB
25(OH)D3 SERPL-MCNC: 38.3 NG/ML (ref 32–100)
A-G RATIO,AGRAT: 2.1 RATIO (ref 1.1–2.6)
ALBUMIN SERPL-MCNC: 4.4 G/DL (ref 3.5–5)
ALP SERPL-CCNC: 46 U/L (ref 40–120)
ALT SERPL-CCNC: 23 U/L (ref 5–40)
ANION GAP SERPL CALC-SCNC: 15 MMOL/L
AST SERPL W P-5'-P-CCNC: 22 U/L (ref 10–37)
AVG GLU, 10930: 166 MG/DL (ref 91–123)
BILIRUB SERPL-MCNC: 0.3 MG/DL (ref 0.2–1.2)
BUN SERPL-MCNC: 16 MG/DL (ref 6–22)
CALCIUM SERPL-MCNC: 9.5 MG/DL (ref 8.4–10.5)
CHLORIDE SERPL-SCNC: 105 MMOL/L (ref 98–110)
CHOLEST SERPL-MCNC: 139 MG/DL (ref 110–200)
CO2 SERPL-SCNC: 24 MMOL/L (ref 20–32)
CREAT SERPL-MCNC: 0.7 MG/DL (ref 0.8–1.4)
CREATININE, URINE: 201 MG/DL
ERYTHROCYTE [DISTWIDTH] IN BLOOD BY AUTOMATED COUNT: 13.2 % (ref 10–15.5)
GFRAA, 66117: >60
GFRNA, 66118: >60
GLOBULIN,GLOB: 2.1 G/DL (ref 2–4)
GLUCOSE SERPL-MCNC: 155 MG/DL (ref 70–99)
HBA1C MFR BLD HPLC: 7.4 % (ref 4.8–5.9)
HCT VFR BLD AUTO: 41.5 % (ref 35.1–48.3)
HDLC SERPL-MCNC: 2.8 MG/DL (ref 0–5)
HDLC SERPL-MCNC: 49 MG/DL (ref 40–59)
HGB BLD-MCNC: 13.3 G/DL (ref 11.7–16.1)
LDLC SERPL CALC-MCNC: 55 MG/DL (ref 50–99)
MCH RBC QN AUTO: 30 PG (ref 26–34)
MCHC RBC AUTO-ENTMCNC: 32 G/DL (ref 31–36)
MCV RBC AUTO: 92 FL (ref 80–95)
MICROALB/CREAT RATIO, 140286: NORMAL MCG/MG OF CREATININE (ref 0–30)
MICROALBUMIN,URINE RANDOM 140054: <12 MCG/ML (ref 0.1–17)
PLATELET # BLD AUTO: 214 K/UL (ref 140–440)
PMV BLD AUTO: 9.6 FL (ref 9–13)
POTASSIUM SERPL-SCNC: 4.5 MMOL/L (ref 3.5–5.5)
PROT SERPL-MCNC: 6.5 G/DL (ref 6.2–8.1)
RBC # BLD AUTO: 4.51 M/UL (ref 3.8–5.2)
SENTARA SPECIMEN COL,SENBCF: NORMAL
SODIUM SERPL-SCNC: 144 MMOL/L (ref 133–145)
TRIGL SERPL-MCNC: 172 MG/DL (ref 40–149)
VLDLC SERPL CALC-MCNC: 34 MG/DL (ref 8–30)
WBC # BLD AUTO: 6 K/UL (ref 4–11)

## 2019-05-30 PROCEDURE — 99001 SPECIMEN HANDLING PT-LAB: CPT

## 2019-05-30 NOTE — PROGRESS NOTES
79 y.o. WHITE OR  female who presents for f/u    She continues to tamox for RIGHT breast ca being followed by Dr Maria Guadalupe Hunter    She is doing 10k steps daily, no cardiovascular complaints     Denies polyuria, polydipsia, nocturia, vision change. She's on [place] and [place] and no hypoglycemia episodes. FBS>150 most days. Down a few pounds, she is doing glucerna for lunch    Vitals 5/31/2019 2/21/2019 1/14/2019 11/27/2018 9/19/2018   Weight 142 lb 146 lb 145 lb 143 lb 146 lb     Remains on PPI and iron supplementation, no GI complaints to report    Continues to see Dr Colette Hinojosa. She has had a cough for a day, minimal slightly green sputum, no wheezing, dyspnea, fever    LAST MEDICARE WELLNESS EXAM: never as not medicare b    Past Medical History:   Diagnosis Date    ADD (attention deficit disorder) 2014    [facility] psych    Amebic colitis 1970s    Arthritis     s/p cortisone left knee    Breast cancer [address] [address] [address] 02/2016    Dr Maria Guadalupe Hunter; RIGHT partial mastectomy, adjuvant XRT    Bronchiectasis     Dr. Colette Hinojosa; RLL    Diabetes mellitus ([facility] [address] 09/2013    on basis of elev a1c    Fibrocystic breast disease     Dr. Zuly Marshall GERD (gastroesophageal reflux disease)     Dr. Mulugeta Berger s/p dilation 2/12    H/O pulmonary function tests 2011    ratio 70, FEV1 81 no change postbd, TLC 93, RV 94, DLCO 81    Hyperlipidemia     Hypovitaminosis D     Insomnia     Iron deficiency anemia 05/2018    eval Dr Mulugeta Berger showed gastritis    Left rotator cuff tear 2000s    conservative therapy    Osteopenia     DEXA t-score -1.2 spine, -0.2 hip (4/08);  spine -1.2, hip 0.1 (10/10);  -1.8 spine, -0.3 FRAX 6.3/0.2 (7/13); [facility] -1.7 spine, 0.1 hip (4/17)    Overweight (BMI 25.0-29. 9)     IF 5/18 but unwilling to do; phentermine 1/19 start weight 145 lbs    Sinus tachycardia 2018    Dr Blanco Aguilera    Spontaneous pneumothorax 1970s    x3    Zoster right S1 3/12     Past Surgical History:   Procedure Laterality Date    BREAST SURGERY PROCEDURE UNLISTED      Left nipple duct exploration and excisional biopsy    CARDIAC SURG PROCEDURE UNLIST      NST negative, ef 75%    CARDIAC SURG PROCEDURE UNLIST  10/2018    Dr Davi Solitario; 30d monitor neg    HX BREAST BIOPSY      Right Breast biopsy w/needle    HX BREAST LUMPECTOMY Right 2017    HX COLONOSCOPY      Dr. Merle Branett negative ; diverticulosis  Dr Sia Grant; Dr Merle Barnett 17 neg    HX ENDOSCOPY  2018    Dr Merle Barnett; gastropathy, h pylori neg    HX HYSTERECTOMY      fibroids/ovarian cysts - Dr. 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     Employer: [employer]   Social Needs    Financial resource strain: Not on file    Food insecurity:     Worry: Not on file     Inability: Not on file   Cartesian needs:     Medical: Not on file     Non-medical: Not on file   Tobacco Use    Smoking status: Former Smoker     Packs/day: 1.00     Years: 5.00     Pack years: 5.00     Last attempt to quit: 1973     Years since quittin.8    Smokeless tobacco: Never Used   Substance and Sexual Activity    Alcohol use: No    Drug use: No    Sexual activity: Not on file   Lifestyle    Physical activity:     Days per week: Not on file     Minutes per session: Not on file    Stress: Not on file   Relationships    Social connections:     Talks on phone: Not on file     Gets together: Not on file     Attends [employer] service: Not on file     Active member of club or organization: Not on file     Attends meetings of clubs or organizations: Not on file     Relationship status: Not on file    Intimate partner violence:     Fear of current or ex partner: Not on file     Emotionally abused: Not on file     Physically abused: Not on file     Forced sexual activity: Not on file   Other Topics Concern    Not on file   Social History Narrative    Not on file     Current Outpatient Medications   Medication Sig    dexlansoprazole (DEXILANT) 60 mg CpDB capsule (delayed release) Take  by mouth.  empagliflozin (JARDIANCE) 10 mg tablet Take 1 Tab by mouth daily.  diazePAM (VALIUM) 5 mg tablet TAKE 1 TABLET BY MOUTH EVERY 12 HOURS AS NEEDED FOR ANXIETY*MAX 2 PER DAY*    pravastatin (PRAVACHOL) 40 mg tablet TAKE 1 TAB BY MOUTH BY MOUTH NIGHTLY.  JANUVIA 100 mg tablet TAKE 1 TABLET BY MOUTH DAILY.  ergocalciferol (ERGOCALCIFEROL) 50,000 unit capsule Take 1 Cap by mouth every seven (7) days.  ADVAIR DISKUS 250-50 mcg/dose diskus inhaler TAKE 1 PUFF BY INHALATION TWO (2) TIMES A DAY. (Patient taking differently: TAKE 1 PUFF BY INHALATION as needed)    tamoxifen (NOLVADEX) 10 mg tablet Take  by mouth daily.  albuterol (VENTOLIN HFA) 90 mcg/actuation inhaler Take 2 Puffs by inhalation every six (6) hours as needed for Wheezing. Indications: BRONCHOSPASTIC PULMONARY DISEASE    b complex vitamins (B COMPLEX 1) tablet Take 1 Tab by mouth daily. No current facility-administered medications for this visit.       Allergies   Allergen Reactions    Actos [Pioglitazone] Other (comments)     Felt bad, weight gain    Avelox [Moxifloxacin] Other (comments)     Felt jittery    Crestor [Rosuvastatin] Myalgia    Evista [Raloxifene] Other (comments)     Severe flashes    Lipitor [Atorvastatin] Other (comments)     Leg cramps and muscle aches    Macrobid [Nitrofurantoin Monohyd/M-Cryst] Hives    Metformin Other (comments)     Gi intolerance    Neuromuscular Blockers, Steroidal Other (comments)     \"patient feels crazy\"    Nsaids (Non-Steroidal Anti-Inflammatory Drug) Other (comments)     H/o gastropathy    Phentermine Other (comments)     Felt bad REVIEW OF SYSTEMS: gyn 2012, mammo 2018, DEXA 4/17, colo 12/13 Dr Gatito Moore, Dr Sarmiento Beverage  no vision change or eye pain  Oral  no mouth pain, tongue or tooth problems  Ears  no hearing loss, ear pain, fullness, no swallowing problems  Cardiac  no CP, PND, orthopnea, edema, palpitations or syncope  Chest  no breast masses  Resp  no wheezing, chronic coughing, dyspnea  GI  no heartburn, nausea, vomiting, change in bowel habits, bleeding, hemorrhoids  Urinary  no dysuria, hematuria, flank pain, urgency, frequency    Visit Vitals  /75   Pulse (!) 104   Temp 98.4 °F (36.9 °C) (Oral)   Resp 14   Ht 5' 3\" (1.6 m)   Wt 142 lb (64.4 kg)   SpO2 96%   BMI 25.15 kg/m²   A&O x3  Affect is appropriate. Mood stable  No apparent distress  Anicteric, no JVD, adenopathy or thyromegaly. No carotid bruits or radiated murmur  Lungs clear to auscultation, no wheezes or rales  Heart showed tachycardic regular rate and rhythm. No murmur, rubs, gallops  Abdomen soft nontender, no hepatosplenomegaly or masses.    Ext without c/c/e    LABS  From 9/10 showed   gluc 106, cr 0.70, gfr>60, alt 42,                   chol 238, tg 154, hdl 54, ldl-c 153, wbc 6.5, hb 13.1, plt 258, ua neg  From 2/11 showed   gluc 95,   cr 0.90                                                                                                   wbc 7.0, hb 14.1, plt 308  From 3/12 showed   gluc 103, cr 0.90              alt 43,                                                                           wbc 6.2, hb 13.7, plt 285  From 3/13 showed   gluc 130, cr 0.60,             alt 22,                    chol 244, tg 180, hdl 57, ldl-c 151,                                          ua neg, tsh 1.79  From 9/13 showed   gluc 119, cr 0.70, gfr>60,            hba1c 6.6, chol 228, tg 145, hdl 62, ldl-c 137, 2hr   From 2/14 showed   gluc 95,   cr 0.60, gfr>61, alt 20, hba1c 5.7,                    umar 2.8  From 4/14 showed   gluc 144, cr 0.98, gfr>60, alt 31,          wbc 7.6, hb 13.4, plt 317, ck/trop neg  From 10/14 showed gluc 98,   cr 0.60, gfr>60, alt 20,          chol 163, tg 96,   hdl 64, ldl-c 80  From 4/15 showed   gluc 96,   cr 0.70, gfr>60, alt 19, hba1c 6.1, chol 207, tg 126, hdl 68, ldl-c 114  From 10/15 showed        hba1c 6.3, chol 188, tg 118, hdl 66, ldl-c 98,   wbc 5.3, hb 13.3, plt 287, umar neg  From 4/16 showed   gluc 98,   cr 0.60, gfr>60, alt 19, hba1c 6.3, chol 170, tg 141, hdl 57, ldl-c 85  From 10/16 showed gluc 137, cr 0.50, gfr>60, alt 17, hba1c 6.2, chol 176, tg 302, hdl 54, ldl-c 62,   wbc 8.9, hb 12.3, plt 393, umar neg  From 1/17 showed   gluc 114, cr 0.60, gfr>60,           wbc 6.1, hb 12.9, plt 356  From 4/17 showed        hba1c 6.8, chol 223, tg 240, hdl 53, ldl-c 122  From 9/17 showed   gluc 109, cr 0.70, gfr>60, alt 16, hba1c 6.7,                  umar neg  From 1/18 showed   gluc 104, cr 0.60, gfr>60, alt 14, hba1c 6.8, chol 154, tg 197, hdl 60, ldl-c 55  From 5/18 showed        hba1c 7.0,         wbc 5.7, hb 11.3, plt 281, umar neg, fe 43, %sat 11, ferritin 7,   b12 588, fol 18.6, spep neg, retic 1.6  From 9/18 showed   gluc 143, cr 0.70, gfr>60,    hba1c 7.0, chol 155, tg 199, hdl 56, ldl-c 59  From 1/19 showed                   fe 80, %sat 28, ferritin 28, vit d 37.1    Results for orders placed or performed in visit on 05/30/19   LIPID PANEL   Result Value Ref Range    Triglyceride 172 (H) 40 - 149 mg/dL    HDL Cholesterol 49 40 - 59 mg/dL    Cholesterol, total 139 110 - 200 mg/dL    CHOLESTEROL/HDL 2.8 0.0 - 5.0    LDL, calculated 55 50 - 99 mg/dL    VLDL, calculated 34 (H) 8 - 30 mg/dL   METABOLIC PANEL, COMPREHENSIVE   Result Value Ref Range    Glucose 155 (H) 70 - 99 mg/dL    BUN 16 6 - 22 mg/dL    Creatinine 0.7 (L) 0.8 - 1.4 mg/dL    Sodium 144 133 - 145 mmol/L    Potassium 4.5 3.5 - 5.5 mmol/L    Chloride 105 98 - 110 mmol/L    CO2 24 20 - 32 mmol/L    AST (SGOT) 22 10 - 37 U/L    ALT (SGPT) 23 5 - 40 U/L    Alk. phosphatase 46 40 - 120 U/L    Bilirubin, total 0.3 0.2 - 1.2 mg/dL    Calcium 9.5 8.4 - 10.5 mg/dL    Protein, total 6.5 6.2 - 8.1 g/dL    Albumin 4.4 3.5 - 5.0 g/dL    A-G Ratio 2.1 1.1 - 2.6 ratio    Globulin 2.1 2.0 - 4.0 g/dL    Anion gap 15.0 mmol/L    GFRAA >60.0 >60.0    GFRNA >60.0 >60.0   VITAMIN D, 25 HYDROXY   Result Value Ref Range    VITAMIN D, 25-HYDROXY 38.3 32.0 - 100.0 ng/mL   HEMOGLOBIN A1C W/O EAG   Result Value Ref Range    Hemoglobin A1c 7.4 (H) 4.8 - 5.9 %    AVG  (H) 91 - 123 mg/dL   CBC W/O DIFF   Result Value Ref Range    WBC 6.0 4.0 - 11.0 K/uL    RBC 4.51 3.80 - 5.20 M/uL    HGB 13.3 11.7 - 16.1 g/dL    HCT 41.5 35.1 - 48.3 %    MCV 92 80 - 95 fL    MCH 30 26 - 34 pg    MCHC 32 31 - 36 g/dL    RDW 13.2 10.0 - 15.5 %    PLATELET 008 346 - 105 K/uL    MPV 9.6 9.0 - 13.0 fL   MICROALBUMIN, UR, RAND   Result Value Ref Range    Creatinine, urine 201 mg/dL    Microalbumin, urine <12.0 0.1 - 17.0 mcg/mL    Microalb/Creat ratio (ug/mg creat.)  0.0 - 30.0 mcg/mg of Creatinine     Patient Active Problem List   Diagnosis Code    Bronchiectasis Dr. Gurwinder Aden J47.9    Osteopenia  M85.80    Hyperlipidemia E78.5    Gastroesophageal reflux disease without esophagitis K21.9    Uncontrolled type 2 diabetes mellitus E11.65    History of breast cancer Z85.3    Vitamin D deficiency E55.9    Overweight (BMI 25.0-29. 9) E66.3     Assessment and plan:  1. Pulm. F/U Dr Yayo Self and continue current  2. GERD. Continue ppi and avoidance measures  3. Osteopenia. Ca/d, wt bearing exercises  4. DM. Add jardiance to [place] and [place], declined glp for now  5. Lipids. Continue current regimen. 6  Breast ca. Per Dr Mills Erm  7. Iron def anemia. Continue supplementation and recheck levels in next visit  8. Overweight. Lifestyle and dietary measures. Portion control reiterated. 9. Pulmonary.   Call if progressive uri complaints      RTC 9/19    Above conditions discussed at length and patient vocalized understanding.   All questions answered to patient satisfaction

## 2019-05-31 ENCOUNTER — OFFICE VISIT (OUTPATIENT)
Dept: INTERNAL MEDICINE CLINIC | Age: 69
End: 2019-05-31

## 2019-05-31 VITALS
HEIGHT: 63 IN | RESPIRATION RATE: 14 BRPM | SYSTOLIC BLOOD PRESSURE: 135 MMHG | TEMPERATURE: 98.4 F | WEIGHT: 142 LBS | OXYGEN SATURATION: 96 % | DIASTOLIC BLOOD PRESSURE: 75 MMHG | HEART RATE: 104 BPM | BODY MASS INDEX: 25.16 KG/M2

## 2019-05-31 DIAGNOSIS — E78.5 HYPERLIPIDEMIA, UNSPECIFIED HYPERLIPIDEMIA TYPE: ICD-10-CM

## 2019-05-31 DIAGNOSIS — K21.9 GASTROESOPHAGEAL REFLUX DISEASE WITHOUT ESOPHAGITIS: ICD-10-CM

## 2019-05-31 DIAGNOSIS — D50.9 IRON DEFICIENCY ANEMIA, UNSPECIFIED IRON DEFICIENCY ANEMIA TYPE: ICD-10-CM

## 2019-05-31 DIAGNOSIS — Z85.3 HISTORY OF BREAST CANCER: ICD-10-CM

## 2019-05-31 DIAGNOSIS — J47.9 BRONCHIECTASIS WITHOUT COMPLICATION (HCC): ICD-10-CM

## 2019-05-31 DIAGNOSIS — E66.3 OVERWEIGHT (BMI 25.0-29.9): ICD-10-CM

## 2019-05-31 DIAGNOSIS — E55.9 VITAMIN D DEFICIENCY: Primary | ICD-10-CM

## 2019-05-31 RX ORDER — DEXLANSOPRAZOLE 60 MG/1
CAPSULE, DELAYED RELEASE ORAL
COMMUNITY
End: 2020-04-06 | Stop reason: SDUPTHER

## 2019-05-31 NOTE — PROGRESS NOTES
Siomara Carter presents today for   Chief Complaint   Patient presents with    Cholesterol Problem     4 month follow up with labs    Cough     cough, body aches, headache and left lung pain onset yesterday              Depression Screening:  3 most recent PHQ Screens 1/14/2019   Little interest or pleasure in doing things Not at all   Feeling down, depressed, irritable, or hopeless Not at all   Total Score PHQ 2 0       Learning Assessment:  Learning Assessment 5/31/2019   PRIMARY LEARNER Patient   HIGHEST LEVEL OF EDUCATION - PRIMARY LEARNER  > 4 YEARS [place] PRIMARY LEARNER NONE   CO-LEARNER CAREGIVER No   PRIMARY LANGUAGE ENGLISH    NEED -   LEARNER PREFERENCE PRIMARY READING     -   ANSWERED BY PATIENT   RELATIONSHIP SELF       Abuse Screening:  Abuse Screening Questionnaire 1/14/2019   Do you ever feel afraid of your partner? N   Are you in a relationship with someone who physically or mentally threatens you? N   Is it safe for you to go home? Y       Fall Risk  Fall Risk Assessment, last 12 mths 1/14/2019   Able to walk? Yes   Fall in past 12 months? No   Fall with injury? -   Number of falls in past 12 months -   Fall Risk Score -           Coordination of Care:  1. Have you been to the ER, urgent care clinic since your last visit? Hospitalized since your last visit? Yes urgent care    2. Have you seen or consulted any other health care providers outside of the [address] [place] [address] since your last visit? Include any pap smears or colon screening.  NO

## 2019-06-03 ENCOUNTER — TELEPHONE (OUTPATIENT)
Dept: INTERNAL MEDICINE CLINIC | Age: 69
End: 2019-06-03

## 2019-06-03 DIAGNOSIS — J40 BRONCHITIS: Primary | ICD-10-CM

## 2019-06-03 RX ORDER — AZITHROMYCIN 250 MG/1
TABLET, FILM COATED ORAL
Qty: 6 TAB | Refills: 0 | Status: SHIPPED | OUTPATIENT
Start: 2019-06-03 | End: 2019-10-03 | Stop reason: ALTCHOICE

## 2019-06-03 NOTE — TELEPHONE ENCOUNTER
Pt called stating she was seen on 05/31 and was dx with just viral but is now running fever and cough is asking for something called into pharmacy

## 2019-06-05 ENCOUNTER — TELEPHONE (OUTPATIENT)
Dept: PULMONOLOGY | Age: 69
End: 2019-06-05

## 2019-06-05 NOTE — TELEPHONE ENCOUNTER
Pt states she started feeling like she was coming down w/ a virus 5/30 and PCP prescribed a ZPack 6/3 once fever reached 100.4. States since then she's been woken up with left side/left lung pain that eases some when up moving around. Please advise [phone].

## 2019-06-05 NOTE — TELEPHONE ENCOUNTER
Pt states she was given a Zpak Monday for cough, congestion and fever. Last pm she woke up with left sided lung pain. In the past a zpak doesn't take care of her lung infections and pt feels she needs to be seen and have an cxr.    appt given today

## 2019-06-06 ENCOUNTER — TELEPHONE (OUTPATIENT)
Dept: PULMONOLOGY | Age: 69
End: 2019-06-06

## 2019-06-06 DIAGNOSIS — R93.89 ABNORMAL CXR: Primary | ICD-10-CM

## 2019-06-06 DIAGNOSIS — R05.9 COUGH: ICD-10-CM

## 2019-06-06 DIAGNOSIS — J47.0 BRONCHIECTASIS WITH ACUTE LOWER RESPIRATORY INFECTION (HCC): ICD-10-CM

## 2019-06-26 ENCOUNTER — HOSPITAL ENCOUNTER (OUTPATIENT)
Dept: CT IMAGING | Age: 69
Discharge: HOME | End: 2019-06-26
Attending: NURSE PRACTITIONER
Payer: COMMERCIAL

## 2019-06-26 DIAGNOSIS — J47.0 BRONCHIECTASIS WITH ACUTE LOWER RESPIRATORY INFECTION (HCC): ICD-10-CM

## 2019-06-26 DIAGNOSIS — R05.9 COUGH: ICD-10-CM

## 2019-06-26 DIAGNOSIS — R93.89 ABNORMAL CXR: ICD-10-CM

## 2019-06-26 PROCEDURE — 71250 CT THORAX DX C-: CPT

## 2019-06-28 DIAGNOSIS — F41.9 ANXIETY: ICD-10-CM

## 2019-06-28 RX ORDER — DIAZEPAM 5 MG/1
TABLET ORAL
Qty: 40 TAB | Refills: 1 | OUTPATIENT
Start: 2019-06-28 | End: 2019-09-18 | Stop reason: SDUPTHER

## 2019-06-28 NOTE — TELEPHONE ENCOUNTER
LOV 05/31/2019  F/U 10/03/2019  Patient just realized she only has enough pills for a few days and is leaving out of town on Monday. Would like to see if this can be called in today if possible.  I advised patient I would put request in as urgent and we would do our best.

## 2019-07-03 ENCOUNTER — TELEPHONE (OUTPATIENT)
Dept: PULMONOLOGY | Age: 69
End: 2019-07-03

## 2019-07-03 NOTE — TELEPHONE ENCOUNTER
The pt. Is given ARYA Iqbal NP's review of the CT Scan. She is offered a future appt. W/V. Aishwarya Melo MD in August but, is desiring to wait til Sept..

## 2019-07-03 NOTE — TELEPHONE ENCOUNTER
Message   Received:  Today   Message Contents   Rachel Watkins, NICHOLAS Nguyen LPN   Caller: Unspecified (Today, 10:04 AM)             Can you please let her know her CT was stable and showed no new findings from previous.  She can follow up as needed. Madai Bear you

## 2019-07-03 NOTE — TELEPHONE ENCOUNTER
PT GABRIEL([phone]). WANTS TO KNOW THE RESULTS OF CT DONE SO [facility] 6/26/19. PLEASE CHECK WITH  AND CALL HER BACK.

## 2019-07-16 DIAGNOSIS — E55.9 VITAMIN D DEFICIENCY: ICD-10-CM

## 2019-08-22 DIAGNOSIS — E55.9 VITAMIN D DEFICIENCY: ICD-10-CM

## 2019-08-22 RX ORDER — ERGOCALCIFEROL 1.25 MG/1
50000 CAPSULE ORAL
Qty: 13 CAP | Refills: 3 | Status: SHIPPED | OUTPATIENT
Start: 2019-08-22 | End: 2020-09-20

## 2019-08-22 NOTE — TELEPHONE ENCOUNTER
Last Visit: 5/31/19 with MD Jose Cisse  Next Appointment: 10/3/19 with MD Jose Cisse  Previous Refill Encounter(s): 9/14/18 #13 with 3 refills    Requested Prescriptions     Pending Prescriptions Disp Refills    ergocalciferol (ERGOCALCIFEROL) 50,000 unit capsule 13 Cap 3     Sig: Take 1 Cap by mouth every seven (7) days.

## 2019-09-18 DIAGNOSIS — Z79.899 MEDICATION MANAGEMENT: Primary | ICD-10-CM

## 2019-09-18 DIAGNOSIS — Z79.899 MEDICATION MANAGEMENT: ICD-10-CM

## 2019-09-18 DIAGNOSIS — F41.9 ANXIETY: ICD-10-CM

## 2019-09-18 RX ORDER — DIAZEPAM 5 MG/1
5 TABLET ORAL
Qty: 40 TAB | Refills: 1 | OUTPATIENT
Start: 2019-09-18 | End: 2019-12-09 | Stop reason: SDUPTHER

## 2019-09-18 NOTE — TELEPHONE ENCOUNTER
[facility]30[facility]19 for a 20 d/s. Last Visit: 5/31/19 with MD Sagrario Menchaca  Next Appointment: 10/3/19 with MD Sagrario Menchaca  Previous Refill Encounter(s): 6/28/19 #40 with 1 refill    Requested Prescriptions     Pending Prescriptions Disp Refills    diazePAM (VALIUM) 5 mg tablet 40 Tab 1     Sig: Take 1 Tab by mouth every twelve (12) hours as needed for Anxiety. Max Daily Amount: 10 mg.

## 2019-09-18 NOTE — TELEPHONE ENCOUNTER
Valium called in to pharmacy    No UDS/CSA on file. UDS ordered per verbal order from Dr. Perry Mediate    Patient aware she will need to complete UDS/CSA prior to next fill- patient has appointment 10/3/19 and will complete this then.

## 2019-09-19 DIAGNOSIS — E55.9 VITAMIN D DEFICIENCY: ICD-10-CM

## 2019-09-19 DIAGNOSIS — D50.9 IRON DEFICIENCY ANEMIA, UNSPECIFIED IRON DEFICIENCY ANEMIA TYPE: ICD-10-CM

## 2019-09-30 ENCOUNTER — HOSPITAL ENCOUNTER (OUTPATIENT)
Dept: LAB | Age: 69
Discharge: HOME | End: 2019-09-30

## 2019-09-30 LAB
25(OH)D3 SERPL-MCNC: 48.7 NG/ML (ref 32–100)
AVG GLU, 10930: 149 MG/DL (ref 91–123)
CREATININE, URINE: 147 MG/DL
FE % SATURATION,PSAT: 29 % (ref 20–50)
HBA1C MFR BLD HPLC: 6.8 % (ref 4.8–5.9)
IRON,IRN: 79 MCG/DL (ref 30–160)
MICROALB/CREAT RATIO, 140286: 9.8 MCG/MG OF CREATININE (ref 0–30)
MICROALBUMIN,URINE RANDOM 140054: 14.4 MCG/ML (ref 0.1–17)
SENTARA SPECIMEN COL,SENBCF: NORMAL
TIBC,TIBC: 267 MCG/DL (ref 228–428)
UIBC SERPL-MCNC: 188 MCG/DL (ref 110–370)

## 2019-09-30 PROCEDURE — 99001 SPECIMEN HANDLING PT-LAB: CPT

## 2019-09-30 NOTE — PROGRESS NOTES
81 y.o. WHITE OR  female who presents for f/u    She continues to tamox for RIGHT breast ca being followed by Dr Lorena Delgado to do 10k steps daily, no cardiovascular complaints     Denies polyuria, polydipsia, nocturia, vision change. She's on [place] and [place] and no hypoglycemia episodes. We added jardiance at the last encounter but she could not take after a month due to recurring yeast infections. She is doing better with diet, weight is down    Vitals 10/3/2019 6/5/2019 6/5/2019 5/31/2019 2/21/2019 1/14/2019   Weight 139 lb  140 lb 142 lb 146 lb 145 lb     Remains on PPI and iron supplementation, no GI complaints to report    Continues to see Dr Grecia Monk. LAST MEDICARE WELLNESS EXAM: never as not medicare b    Past Medical History:   Diagnosis Date    ADD (attention deficit disorder) 2014    [facility] psych    Amebic colitis 1970s    Arthritis     s/p cortisone left knee    Breast cancer ([facility] [address] 02/2016    Dr Luis Dillon; RIGHT partial mastectomy, adjuvant XRT    Bronchiectasis     Dr. Grecia Monk; RLL    Diabetes mellitus ([facility] [address] 09/2013    on basis of elev a1c    Fibrocystic breast disease     Dr. Pedro Hannon GERD (gastroesophageal reflux disease)     Dr. Carmenza De Jesus s/p dilation 2/12    H/O pulmonary function tests 2011    ratio 70, FEV1 81 no change postbd, TLC 93, RV 94, DLCO 81    Hyperlipidemia     Hypovitaminosis D     Insomnia     Iron deficiency anemia 05/2018    eval Dr Carmenza De Jesus showed gastritis    Left rotator cuff tear 2000s    conservative therapy    Osteopenia     DEXA t-score -1.2 spine, -0.2 hip (4/08);  spine -1.2, hip 0.1 (10/10);  -1.8 spine, -0.3 FRAX 6.3/0.2 (7/13); [facility] -1.7 spine, 0.1 hip (4/17)    Overweight (BMI 25.0-29. 9)     IF 5/18 but unwilling to do; phentermine 1/19 start weight 145 lbs    Sinus tachycardia 2018    Dr Harvey Burgos    Spontaneous pneumothorax 1970s    x3    Zoster right S1 3/12     Past Surgical History:   Procedure Laterality Date    BREAST SURGERY PROCEDURE UNLISTED      Left nipple duct exploration and excisional biopsy    CARDIAC SURG PROCEDURE UNLIST      NST negative, ef 75%    CARDIAC SURG PROCEDURE UNLIST  10/2018    Dr Aaron Gonzales; 30d monitor neg    HX BREAST BIOPSY      Right Breast biopsy w/needle    HX BREAST LUMPECTOMY Right 2017    HX COLONOSCOPY      Dr. Kumar Nelson negative ; diverticulosis  Dr Issa Ferrell; Dr Kumar Nelson 17 neg    HX ENDOSCOPY  2018    Dr Kumar Nelson; gastropathy, h pylori neg    HX HYSTERECTOMY  1993    fibroids/ovarian cysts - Dr. 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     Employer: [email or web address]   Social Needs    Financial resource strain: Not on file    Food insecurity:     Worry: Not on file     Inability: Not on file   zlien needs:     Medical: Not on file     Non-medical: Not on file   Tobacco Use    Smoking status: Former Smoker     Packs/day: 1.00     Years: 5.00     Pack years: 5.00     Last attempt to quit: 1973     Years since quittin.2    Smokeless tobacco: Never Used   Substance and Sexual Activity    Alcohol use: No    Drug use: No    Sexual activity: Not on file   Lifestyle    Physical activity:     Days per week: Not on file     Minutes per session: Not on file    Stress: Not on file   Relationships    Social connections:     Talks on phone: Not on file     Gets together: Not on file     Attends Spiritism service: Not on file     Active member of club or organization: Not on file     Attends meetings of clubs or organizations: Not on file     Relationship status: Not on file    Intimate partner violence:     Fear of current or ex partner: Not on file     Emotionally abused: Not on file     Physically abused: Not on file     Forced sexual activity: Not on file   Other Topics Concern    Not on file   Social History Narrative    Not on file     Current Outpatient Medications   Medication Sig    diazePAM (VALIUM) 5 mg tablet Take 1 Tab by mouth every twelve (12) hours as needed for Anxiety. Max Daily Amount: 10 mg.    ergocalciferol (ERGOCALCIFEROL) 50,000 unit capsule Take 1 Cap by mouth every seven (7) days.  dexlansoprazole (DEXILANT) 60 mg CpDB capsule (delayed release) Take  by mouth.  pravastatin (PRAVACHOL) 40 mg tablet TAKE 1 TAB BY MOUTH BY MOUTH NIGHTLY.  JANUVIA 100 mg tablet TAKE 1 TABLET BY MOUTH DAILY.  ADVAIR DISKUS 250-50 mcg/dose diskus inhaler TAKE 1 PUFF BY INHALATION TWO (2) TIMES A DAY. (Patient taking differently: TAKE 1 PUFF BY INHALATION as needed)    tamoxifen (NOLVADEX) 10 mg tablet Take  by mouth daily.  albuterol (VENTOLIN HFA) 90 mcg/actuation inhaler Take 2 Puffs by inhalation every six (6) hours as needed for Wheezing. Indications: BRONCHOSPASTIC PULMONARY DISEASE    b complex vitamins (B COMPLEX 1) tablet Take 1 Tab by mouth daily. No current facility-administered medications for this visit.       Allergies   Allergen Reactions    Actos [Pioglitazone] Other (comments)     Felt bad, weight gain    Avelox [Moxifloxacin] Other (comments)     Felt jittery    Crestor [Rosuvastatin] Myalgia    Evista [Raloxifene] Other (comments)     Severe flashes    Jardiance [Empagliflozin] Other (comments)     Recurring yeast infections    Lipitor [Atorvastatin] Other (comments)     Leg cramps and muscle aches    Macrobid [Nitrofurantoin Monohyd/M-Cryst] Hives    Metformin Other (comments)     Gi intolerance    Neuromuscular Blockers, Steroidal Other (comments)     \"patient feels crazy\"    Nsaids (Non-Steroidal Anti-Inflammatory Drug) Other (comments)     H/o gastropathy    Phentermine Other (comments)     Felt bad     REVIEW OF SYSTEMS: gyn 2012, mammo 2018, DEXA 4/17, colo 12/13 Dr Gucci Mcgovern, Dr Norma Mast  Ophtho - no vision change or eye pain  Oral - no mouth pain, tongue or tooth problems  Ears - no hearing loss, ear pain, fullness, no swallowing problems  Cardiac - no CP, PND, orthopnea, edema, palpitations or syncope  Chest - no breast masses  Resp - no wheezing, chronic coughing, dyspnea  GI - no heartburn, nausea, vomiting, change in bowel habits, bleeding, hemorrhoids  Urinary - no dysuria, hematuria, flank pain, urgency, frequency    Visit Vitals  /70   Pulse 95   Temp 98.1 °F (36.7 °C) (Oral)   Resp 14   Ht 5' 3\" (1.6 m)   Wt 139 lb (63 kg)   SpO2 97%   BMI 24.62 kg/m²   A&O x3  Affect is appropriate. Mood stable  No apparent distress  Anicteric, no JVD, adenopathy or thyromegaly. No carotid bruits or radiated murmur  Lungs clear to auscultation, no wheezes or rales  Heart showed tachycardic regular rate and rhythm. No murmur, rubs, gallops  Abdomen soft nontender, no hepatosplenomegaly or masses.    Ext without c/c/e    LABS  From 9/10 showed   gluc 106, cr 0.70, gfr>60, alt 42,                   chol 238, tg 154, hdl 54, ldl-c 153, wbc 6.5, hb 13.1, plt 258, ua neg  From 2/11 showed   gluc 95,   cr 0.90                                                                                                   wbc 7.0, hb 14.1, plt 308  From 3/12 showed   gluc 103, cr 0.90              alt 43,                                                                           wbc 6.2, hb 13.7, plt 285  From 3/13 showed   gluc 130, cr 0.60,             alt 22,                    chol 244, tg 180, hdl 57, ldl-c 151,                                          ua neg, tsh 1.79  From 9/13 showed   gluc 119, cr 0.70, gfr>60,            hba1c 6.6, chol 228, tg 145, hdl 62, ldl-c 137, 2hr   From 2/14 showed   gluc 95,   cr 0.60, gfr>61, alt 20, hba1c 5.7,                    umar 2.8  From 4/14 showed   gluc 144, cr 0.98, gfr>60, alt 31,          wbc 7.6, hb 13.4, plt 317, ck/trop neg  From 10/14 showed gluc 98,   cr 0.60, gfr>60, alt 20,          chol 163, tg 96,   hdl 64, ldl-c 80  From 4/15 showed   gluc 96,   cr 0.70, gfr>60, alt 19, hba1c 6.1, chol 207, tg 126, hdl 68, ldl-c 114  From 10/15 showed        hba1c 6.3, chol 188, tg 118, hdl 66, ldl-c 98,   wbc 5.3, hb 13.3, plt 287, umar neg  From 4/16 showed   gluc 98,   cr 0.60, gfr>60, alt 19, hba1c 6.3, chol 170, tg 141, hdl 57, ldl-c 85  From 10/16 showed gluc 137, cr 0.50, gfr>60, alt 17, hba1c 6.2, chol 176, tg 302, hdl 54, ldl-c 62,   wbc 8.9, hb 12.3, plt 393, umar neg  From 1/17 showed   gluc 114, cr 0.60, gfr>60,           wbc 6.1, hb 12.9, plt 356  From 4/17 showed        hba1c 6.8, chol 223, tg 240, hdl 53, ldl-c 122  From 9/17 showed   gluc 109, cr 0.70, gfr>60, alt 16, hba1c 6.7,                  umar neg  From 1/18 showed   gluc 104, cr 0.60, gfr>60, alt 14, hba1c 6.8, chol 154, tg 197, hdl 60, ldl-c 55  From 5/18 showed        hba1c 7.0,         wbc 5.7, hb 11.3, plt 281, umar neg, fe 43, %sat 11, ferritin 7,   b12 588, fol 18.6, spep neg, retic 1.6  From 9/18 showed   gluc 143, cr 0.70, gfr>60,    hba1c 7.0, chol 155, tg 199, hdl 56, ldl-c 59  From 1/19 showed                   fe 80, %sat 28, ferritin 28, vit d 37.1  From 5/19 showed   gluc 155, cr 0.70, gfr>60, alt 22, hba1c 7.4, chol 139, tg 172, hdl 49, ldl-c 55,   wbc 6.0, hb 13.3, plt 214, umar neg    Results for orders placed or performed in visit on 09/30/19   IRON PROFILE   Result Value Ref Range    Iron 79 30 - 160 mcg/dL    UIBC 188 110 - 370 mcg/dL    TIBC 267 228 - 428 mcg/dL    Iron % saturation 29 20 - 50 %   VITAMIN D, 25 HYDROXY   Result Value Ref Range    VITAMIN D, 25-HYDROXY 48.7 32.0 - 100.0 ng/mL   HEMOGLOBIN A1C W/O EAG   Result Value Ref Range    Hemoglobin A1c 6.8 (H) 4.8 - 5.9 %    AVG  (H) 91 - 123 mg/dL   MICROALBUMIN, UR, RAND   Result Value Ref Range Creatinine, urine 147 mg/dL    Microalbumin, urine 14.4 0.1 - 17.0 mcg/mL    Microalb/Creat ratio (ug/mg creat.) 9.8 0.0 - 30.0 mcg/mg of Creatinine     Patient Active Problem List   Diagnosis Code    Bronchiectasis Dr. Froilan Cuadra J47.9    Osteopenia  M85.80    Hyperlipidemia E78.5    Gastroesophageal reflux disease without esophagitis K21.9    Uncontrolled type 2 diabetes mellitus E11.65    History of breast cancer Z85.3    Vitamin D deficiency E55.9    Overweight (BMI 25.0-29. 9) E66.3     Assessment and plan:  1. Pulm. F/U Dr Tatianna Stanton and continue current  2. GERD. Continue ppi and avoidance measures  3. Osteopenia. Ca/d, wt bearing exercises  4. DM. Continue current regimen and doing well  5. Lipids. Continue current regimen although we did discuss vascepa possibly for the future  6  Breast ca. Per Dr Rock Ba  7. Iron def anemia. Continue supplementation and recheck levels in next visit  8. Overweight. Lifestyle and dietary measures. Portion control reiterated. 9. Pulmonary. Call if progressive uri complaints      RTC 4/20    Above conditions discussed at length and patient vocalized understanding.   All questions answered to patient satisfaction

## 2019-10-03 ENCOUNTER — OFFICE VISIT (OUTPATIENT)
Dept: INTERNAL MEDICINE CLINIC | Age: 69
End: 2019-10-03

## 2019-10-03 VITALS
SYSTOLIC BLOOD PRESSURE: 104 MMHG | BODY MASS INDEX: 24.63 KG/M2 | TEMPERATURE: 98.1 F | DIASTOLIC BLOOD PRESSURE: 70 MMHG | HEIGHT: 63 IN | RESPIRATION RATE: 14 BRPM | WEIGHT: 139 LBS | OXYGEN SATURATION: 97 % | HEART RATE: 95 BPM

## 2019-10-03 DIAGNOSIS — D50.9 IRON DEFICIENCY ANEMIA, UNSPECIFIED IRON DEFICIENCY ANEMIA TYPE: ICD-10-CM

## 2019-10-03 DIAGNOSIS — Z23 ENCOUNTER FOR IMMUNIZATION: ICD-10-CM

## 2019-10-03 DIAGNOSIS — E78.5 HYPERLIPIDEMIA, UNSPECIFIED HYPERLIPIDEMIA TYPE: ICD-10-CM

## 2019-10-03 DIAGNOSIS — E66.3 OVERWEIGHT (BMI 25.0-29.9): ICD-10-CM

## 2019-10-03 DIAGNOSIS — Z85.3 HISTORY OF BREAST CANCER: ICD-10-CM

## 2019-10-03 DIAGNOSIS — J47.0 BRONCHIECTASIS WITH ACUTE LOWER RESPIRATORY INFECTION (HCC): ICD-10-CM

## 2019-10-03 DIAGNOSIS — E55.9 VITAMIN D DEFICIENCY: ICD-10-CM

## 2019-10-03 DIAGNOSIS — K21.9 GASTROESOPHAGEAL REFLUX DISEASE WITHOUT ESOPHAGITIS: ICD-10-CM

## 2019-10-03 NOTE — PATIENT INSTRUCTIONS
Vaccine Information Statement    Influenza (Flu) Vaccine (Inactivated or Recombinant): What You Need to Know    Many Vaccine Information Statements are available in [place] and other languages. See www.immunize.org/vis  Hojas de información sobre vacunas están disponibles en español y en muchos otros idiomas. Visite www.immunize.org/vis    1. Why get vaccinated? Influenza vaccine can prevent influenza (flu). Flu is a contagious disease that spreads around the [facility] every year, usually between October and May. Anyone can get the flu, but it is more dangerous for some people. Infants and young children, people 72years of age and older, pregnant women, and people with certain health conditions or a weakened immune system are at greatest risk of flu complications. Pneumonia, bronchitis, sinus infections and ear infections are examples of flu-related complications. If you have a medical condition, such as heart disease, cancer or diabetes, flu can make it worse. Flu can cause fever and chills, sore throat, muscle aches, fatigue, cough, headache, and runny or stuffy nose. Some people may have vomiting and diarrhea, though this is more common in children than adults. Each year thousands of people in the [facility] die from flu, and many more are hospitalized. Flu vaccine prevents millions of illnesses and flu-related visits to the doctor each year. 2. Influenza vaccines     CDC recommends everyone 10months of age and older get vaccinated every flu season. Children 6 months through 6years of age may need 2 doses during a single flu season. Everyone else needs only 1 dose each flu season. It takes about 2 weeks for protection to develop after vaccination. There are many flu viruses, and they are always changing. Each year a new flu vaccine is made to protect against three or four viruses that are likely to cause disease in the upcoming flu season.  Even when the vaccine doesnt exactly match these viruses, it may still provide some protection. Influenza vaccine does not cause flu. Influenza vaccine may be given at the same time as other vaccines. 3. Talk with your health care provider    Tell your vaccine provider if the person getting the vaccine:   Has had an allergic reaction after a previous dose of influenza vaccine, or has any severe, life-threatening allergies.  Has ever had Guillain-Barré Syndrome (also called GBS). In some cases, your health care provider may decide to postpone influenza vaccination to a future visit. People with minor illnesses, such as a cold, may be vaccinated. People who are moderately or severely ill should usually wait until they recover before getting influenza vaccine. Your health care provider can give you more information. 4. Risks of a reaction     Soreness, redness, and swelling where shot is given, fever, muscle aches, and headache can happen after influenza vaccine.  There may be a very small increased risk of Guillain-Barré Syndrome (GBS) after inactivated influenza vaccine (the flu shot). [facility] children who get the flu shot along with pneumococcal vaccine (PCV13), and/or DTaP vaccine at the same time might be slightly more likely to have a seizure caused by fever. Tell your health care provider if a child who is getting flu vaccine has ever had a seizure. People sometimes faint after medical procedures, including vaccination. Tell your provider if you feel dizzy or have vision changes or ringing in the ears. As with any medicine, there is a very remote chance of a vaccine causing a severe allergic reaction, other serious injury, or death. 5. What if there is a serious problem? An allergic reaction could occur after the vaccinated person leaves the clinic.  If you see signs of a severe allergic reaction (hives, swelling of the face and throat, difficulty breathing, a fast heartbeat, dizziness, or weakness), call 9-1-1 and get the person to the nearest hospital.    For other signs that concern you, call your health care provider. Adverse reactions should be reported to the Vaccine Adverse Event Reporting System (VAERS). Your health care provider will usually file this report, or you can do it yourself. Visit the VAERS website at www.vaers. [facility].gov or call 1-566.347.4867. VAERS is only for reporting reactions, and VAERS staff do not give medical advice. 6. The National Vaccine Injury Compensation Program    The [facility] Vaccine Injury Compensation Program (VICP) is a federal program that was created to compensate people who may have been injured by certain vaccines. Visit the VICP website at www.[facility].gov/vaccinecompensation or call 7-236.613.3782 to learn about the program and about filing a claim. There is a time limit to file a claim for compensation. 7. How can I learn more?  Ask your health care provider.  Call your local or state health department.  Contact the Centers for Disease Control and Prevention (CDC):  - Call 6-816.890.6305 (1-800-CDC-INFO) or  - Visit CDCs influenza website at www.cdc.gov/flu    Vaccine Information Statement (Interim)  Inactivated Influenza Vaccine   8/15/2019  [address]inda [address]hoff [id number]   Department of Health and Human Services  Centers for Disease Control and Prevention    Office Use Only

## 2019-10-03 NOTE — PROGRESS NOTES
Zeyad Zimmer presents today for   Chief Complaint   Patient presents with    Diabetes     4 month follow up with labs              Depression Screening:  3 most recent PHQ Screens 1/14/2019   Little interest or pleasure in doing things Not at all   Feeling down, depressed, irritable, or hopeless Not at all   Total Score PHQ 2 0       Learning Assessment:  Learning Assessment 5/31/2019   PRIMARY LEARNER Patient   HIGHEST LEVEL OF EDUCATION - PRIMARY LEARNER  > 4 [facility] PRIMARY LEARNER NONE   CO-LEARNER CAREGIVER No   PRIMARY LANGUAGE ENGLISH    NEED -   LEARNER PREFERENCE PRIMARY READING     -   ANSWERED BY PATIENT   RELATIONSHIP SELF       Abuse Screening:  Abuse Screening Questionnaire 1/14/2019   Do you ever feel afraid of your partner? N   Are you in a relationship with someone who physically or mentally threatens you? N   Is it safe for you to go home? Y       Fall Risk  Fall Risk Assessment, last 12 mths 6/5/2019   Able to walk? Yes   Fall in past 12 months? -   Fall with injury? -   Number of falls in past 12 months -   Fall Risk Score -           Coordination of Care:  1. Have you been to the ER, urgent care clinic since your last visit? Hospitalized since your last visit? no    2. Have you seen or consulted any other health care providers outside of the [address] [place] [address] since your last visit? Include any pap smears or colon screening. No    Zeyad Zimmer is a 81 y.o. female who presents for routine immunizations. Per verbal order from [address] for influenza, given in left deltoid. She denies any symptoms , reactions or allergies that would exclude them from being immunized today. Risks and adverse reactions were discussed and the VIS was given to them. All questions were addressed. She was observed for 15 min post injection. There were no reactions observed.     Rowena Kimble LPN

## 2019-10-23 RX ORDER — SITAGLIPTIN 100 MG/1
TABLET, FILM COATED ORAL
Qty: 90 TAB | Refills: 3 | Status: SHIPPED | OUTPATIENT
Start: 2019-10-23 | End: 2020-10-15

## 2019-11-13 ENCOUNTER — OFFICE VISIT (OUTPATIENT)
Dept: ORTHOPEDIC SURGERY | Age: 69
End: 2019-11-13

## 2019-11-13 VITALS
WEIGHT: 142.2 LBS | SYSTOLIC BLOOD PRESSURE: 152 MMHG | TEMPERATURE: 96.4 F | HEIGHT: 63 IN | RESPIRATION RATE: 14 BRPM | BODY MASS INDEX: 25.2 KG/M2 | DIASTOLIC BLOOD PRESSURE: 63 MMHG | OXYGEN SATURATION: 100 % | HEART RATE: 89 BPM

## 2019-11-13 DIAGNOSIS — M25.511 RIGHT SHOULDER PAIN, UNSPECIFIED CHRONICITY: ICD-10-CM

## 2019-11-13 DIAGNOSIS — M75.51 SUBACROMIAL BURSITIS OF RIGHT SHOULDER JOINT: Primary | ICD-10-CM

## 2019-11-13 RX ORDER — DICLOFENAC SODIUM 10 MG/G
4 GEL TOPICAL 4 TIMES DAILY
Qty: 500 G | Refills: 2 | Status: SHIPPED | OUTPATIENT
Start: 2019-11-13 | End: 2021-04-13

## 2019-11-13 RX ORDER — TRIAMCINOLONE ACETONIDE 40 MG/ML
40 INJECTION, SUSPENSION INTRA-ARTICULAR; INTRAMUSCULAR ONCE
Qty: 1 ML | Refills: 0
Start: 2019-11-13 | End: 2019-11-13

## 2019-11-13 NOTE — PROGRESS NOTES
Mitchel Norris  1950   Chief Complaint   Patient presents with    Shoulder Pain     right shoulder pain        HISTORY OF PRESENT ILLNESS  Mitchel Norris is a 81 y.o. female who presents today for evaluation of right shoulder pain. Patient rates pain as 2/10 today. Pain has been present for 1 month. Pt denies any specific injury. Pain with certain movements of the arm, reaching. Pain has gotten progressively worse. Pt reports the pain wakes her up at night on occasion. Patient denies any fever, chills, chest pain, shortness of breath or calf pain. The remainder of the review of systems is negative. There are no new illness or injuries to report since last seen in the office. There are no changes to medications, allergies, family or social history. Pain Assessment  11/13/2019   Location of Pain Shoulder   Location Modifiers Right   Severity of Pain 2   Quality of Pain Aching   Duration of Pain A few hours   Frequency of Pain Intermittent   Aggravating Factors Bending;Stretching;Straightening   Aggravating Factors Comment -   Limiting Behavior -   Relieving Factors Heat;Rest   Result of Injury No   Work-Related Injury -   Type of Injury -     PHYSICAL EXAM:   Visit Vitals  /63   Pulse 89   Temp 96.4 °F (35.8 °C) (Oral)   Resp 14   Ht 5' 3\" (1.6 m)   Wt 142 lb 3.2 oz (64.5 kg)   SpO2 100%   BMI 25.19 kg/m²     The patient is a well-developed, well-nourished female   in no acute distress. The patient is alert and oriented times three. The patient is alert and oriented times three. Mood and affect are normal.  LYMPHATIC: lymph nodes are not enlarged and are within normal limits  SKIN: normal in color and non tender to palpation. There are no bruises or abrasions noted. NEUROLOGICAL: Motor sensory exam is within normal limits. Reflexes are equal bilaterally.  There is normal sensation to pinprick and light touch  MUSCULOSKELETAL:  Examination Right shoulder   Skin Intact   AC joint tenderness -   Biceps tenderness -   Forward flexion/Elevation    Active abduction    Glenohumeral abduction 80   External rotation ROM 45   Internal rotation ROM 30   Apprehension -   Ashlys Relocation -   Jerk -   Load and Shift -   Obriens -   Speeds -   Impingement sign +   Supraspinatus/Empty Can -, 5/5   External Rotation Strength -, 5/5   Lift Off/Belly Press -, 5/5   Neurovascular Intact     PROCEDURE: Right Shoulder Injection with Ultrasound Guidance  Indication:Right Shoulder pain/swelling    After sterile prep, 6 cc of Xylocaine and 1 cc of Kenalog were injected into the right shoulder. Ultrasound images captured using [facility] Ultrasound machine and scanned into patient's chart. [facility]  OFFICE PROCEDURE PROGRESS NOTE        Chart reviewed for the following:  Isabell Martinez M.D, have reviewed the History, Physical and updated the Allergic reactions for [address] performed immediately prior to start of procedure:  Isabell Martinez M.D, have performed the following reviews on [facility] prior to the start of the procedure:            * Patient was identified by name and date of birth   * Agreement on procedure being performed was verified  * Risks and Benefits explained to the patient  * Procedure site verified and marked as necessary  * Patient was positioned for comfort  * Consent was signed and verified     Time: 3:02 PM     Date of procedure: 11/13/2019    Procedure performed by:  Katarina Carlos M.D    Provider assisted by: (see medication administration)    How tolerated by patient: tolerated the procedure well with no complications    Comments: none      IMAGING: XR of right shoulder dated 11/13/19 was reviewed and read by Dr. Shawna Mitchell: No acute abnormalities       IMPRESSION:      ICD-10-CM ICD-9-CM    1.  Subacromial bursitis of right shoulder joint M75.51 726.19 TRIAMCINOLONE ACETONIDE INJ      triamcinolone acetonide (KENALOG) 40 mg/mL injection      US GUIDE INJ/ASP/ARTHRO LG JNT/BURSA      diclofenac (VOLTAREN) 1 % gel   2. Right shoulder pain, unspecified chronicity M25.511 719.41 AMB POC XRAY, SHOULDER; COMPLETE, 2+        PLAN:   1. Pt presents today with right shoulder pain due to subacromial bursitis and I would like to try an injection today. She was also given a prescription for Voltaren gel today. Risk factors include: dm  2. No ultrasound exam indicated today  3. Yes cortisone injection indicated today R SHOULDER US  4. No Physical/Occupational Therapy indicated today  5. No diagnostic test indicated today:   6. No durable medical equipment indicated today  7. No referral indicated today   8. Yes medications indicated today: V GEL  9. No Narcotic indicated today     RTC 3 weeks if pain continues      Scribed by Rod Abarcaachi Smoker) as dictated by Pollo Domingo MD    I, Dr. Pollo Domingo, confirm that all documentation is accurate.     Pollo Domingo M.D.   [facility]

## 2019-11-13 NOTE — PROGRESS NOTES
1. Have you been to the ER, urgent care clinic since your last visit? Hospitalized since your last visit? No    2. Have you seen or consulted any other health care providers outside of the [address] [place] [address] since your last visit? Include any pap smears or colon screening.  No

## 2019-12-09 DIAGNOSIS — F41.9 ANXIETY: ICD-10-CM

## 2019-12-10 ENCOUNTER — APPOINTMENT (OUTPATIENT)
Dept: INTERNAL MEDICINE CLINIC | Age: 69
End: 2019-12-10

## 2019-12-10 RX ORDER — DIAZEPAM 5 MG/1
5 TABLET ORAL
Qty: 40 TAB | Refills: 1 | Status: SHIPPED | OUTPATIENT
Start: 2019-12-10 | End: 2020-02-11

## 2019-12-17 LAB
AMPHETAMINE, 737686: NEGATIVE NG/ML
BARBITURATES: NEGATIVE NG/ML
BENZODIAZEPINES, R9BE1T: POSITIVE NG/ML
CANNABINOID, DR86L: NEGATIVE NG/ML
COCAINE/METABOLITES, MDS2T: NEGATIVE NG/ML
CREATININE URINE,3297223: 237.7 MG/DL (ref 20–300)
FENTANYL, PMD99: NEGATIVE PG/ML
MEPERIDINE, 761055: NEGATIVE NG/ML
METHADONE, 791633: NEGATIVE NG/ML
OPIATE, DR88L: NEGATIVE NG/ML
OXYCODONE/OXYMORPHONE-PM: NEGATIVE NG/ML
PH UR STRIP: 5.2 [PH] (ref 4.5–8.9)
PHENCYCLIDINE: NEGATIVE NG/ML
PLEASE NOTE, TRIC1T: ABNORMAL
PROPOXYPHENE, 791635: NEGATIVE NG/ML
SPECIFIC GRAVITY, 790387: 1.02
TRAMADOL, 711021: NEGATIVE NG/ML

## 2020-01-08 DIAGNOSIS — E78.5 HYPERLIPIDEMIA, UNSPECIFIED HYPERLIPIDEMIA TYPE: ICD-10-CM

## 2020-01-08 RX ORDER — PRAVASTATIN SODIUM 40 MG/1
TABLET ORAL
Qty: 90 TAB | Refills: 3 | Status: SHIPPED | OUTPATIENT
Start: 2020-01-08 | End: 2020-12-10

## 2020-02-05 ENCOUNTER — OFFICE VISIT (OUTPATIENT)
Dept: INTERNAL MEDICINE CLINIC | Age: 70
End: 2020-02-05

## 2020-02-05 VITALS
SYSTOLIC BLOOD PRESSURE: 138 MMHG | RESPIRATION RATE: 16 BRPM | WEIGHT: 141 LBS | OXYGEN SATURATION: 98 % | DIASTOLIC BLOOD PRESSURE: 68 MMHG | BODY MASS INDEX: 24.98 KG/M2 | HEART RATE: 112 BPM | TEMPERATURE: 98.9 F | HEIGHT: 63 IN

## 2020-02-05 DIAGNOSIS — R68.89 FLU-LIKE SYMPTOMS: Primary | ICD-10-CM

## 2020-02-05 DIAGNOSIS — J40 BRONCHITIS: ICD-10-CM

## 2020-02-05 LAB
QUICKVUE INFLUENZA TEST: NEGATIVE
VALID INTERNAL CONTROL?: YES

## 2020-02-05 RX ORDER — AZITHROMYCIN 250 MG/1
TABLET, FILM COATED ORAL
Qty: 6 TAB | Refills: 0 | Status: SHIPPED | OUTPATIENT
Start: 2020-02-05 | End: 2020-04-03

## 2020-02-05 NOTE — PROGRESS NOTES
81 y.o. WHITE OR  female who presents for evaluation. 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 She started feeling unwell over the weekend, feeling tired and wiped out. Two days ago, she started having sinus congestion with green drainage and now has cough with discolored sputum. Some wheezing but no sob, she has not started using her inhaler. Minimal ear pressure, facial discomfort, mild sore throat, no aches or gi sx. Been using zicam and other otc meds for relief    Past Medical History:   Diagnosis Date    ADD (attention deficit disorder) 2014    [facility] psych    Amebic colitis 1970s    Arthritis     s/p cortisone left knee    Breast cancer ([facility] [address] 02/2016    Dr Moses Severs; RIGHT partial mastectomy, adjuvant XRT    Bronchiectasis     Dr. Romina Hooker; RLL    Diabetes mellitus ([facility] [address] 09/2013    on basis of elev a1c    Fibrocystic breast disease     Dr. Nadya Sue GERD (gastroesophageal reflux disease)     Dr. Wilfredo Dumont s/p dilation 2/12    H/O pulmonary function tests 2011    ratio 70, FEV1 81 no change postbd, TLC 93, RV 94, DLCO 81    Hyperlipidemia     Hypovitaminosis D     Insomnia     Iron deficiency anemia 05/2018    eval Dr Wilfredo Dumont showed gastritis    Left rotator cuff tear 2000s    conservative therapy    Osteopenia     DEXA t-score -1.2 spine, -0.2 hip (4/08);  spine -1.2, hip 0.1 (10/10);  -1.8 spine, -0.3 FRAX 6.3/0.2 (7/13); [facility] -1.7 spine, 0.1 hip (4/17)    Overweight (BMI 25.0-29. 9)     IF 5/18 but unwilling to do; phentermine 1/19 start weight 145 lbs    Sinus tachycardia 2018    Dr Vu Angel    Spontaneous pneumothorax 1970s    x3    Zoster right S1 3/12     Current Outpatient Medications   Medication Sig    azithromycin (ZITHROMAX) 250 mg tablet Take 2 tablets today, then take 1 tablet daily    pravastatin (PRAVACHOL) 40 mg tablet TAKE 1 TAB BY MOUTH BY MOUTH NIGHTLY.     diazePAM (VALIUM) 5 mg tablet Take 1 Tab by mouth every twelve (12) hours as needed for Anxiety. Max Daily Amount: 10 mg.    JANUVIA 100 mg tablet TAKE 1 TABLET BY MOUTH DAILY.  ergocalciferol (ERGOCALCIFEROL) 50,000 unit capsule Take 1 Cap by mouth every seven (7) days.  dexlansoprazole (DEXILANT) 60 mg CpDB capsule (delayed release) Take  by mouth.  ADVAIR DISKUS 250-50 mcg/dose diskus inhaler TAKE 1 PUFF BY INHALATION TWO (2) TIMES A DAY. (Patient taking differently: TAKE 1 PUFF BY INHALATION as needed)    tamoxifen (NOLVADEX) 10 mg tablet Take  by mouth daily.  albuterol (VENTOLIN HFA) 90 mcg/actuation inhaler Take 2 Puffs by inhalation every six (6) hours as needed for Wheezing. Indications: BRONCHOSPASTIC PULMONARY DISEASE    b complex vitamins (B COMPLEX 1) tablet Take 1 Tab by mouth daily.  diclofenac (VOLTAREN) 1 % gel Apply 4 g to affected area four (4) times daily. No current facility-administered medications for this visit. Allergies   Allergen Reactions    Actos [Pioglitazone] Other (comments)     Felt bad, weight gain    Avelox [Moxifloxacin] Other (comments)     Felt jittery    Crestor [Rosuvastatin] Myalgia    Evista [Raloxifene] Other (comments)     Severe flashes    Jardiance [Empagliflozin] Other (comments)     Recurring yeast infections    Lipitor [Atorvastatin] Other (comments)     Leg cramps and muscle aches    Macrobid [Nitrofurantoin Monohyd/M-Cryst] Hives    Metformin Other (comments)     Gi intolerance    Neuromuscular Blockers, Steroidal Other (comments)     \"patient feels crazy\"    Nsaids (Non-Steroidal Anti-Inflammatory Drug) Other (comments)     H/o gastropathy    Phentermine Other (comments)     Felt bad     Visit Vitals  /68 (BP 1 Location: Right arm, BP Patient Position: Sitting)   Pulse (!) 112   Temp 98.9 °F (37.2 °C) (Oral)   Resp 16   Ht 5' 3\" (1.6 m)   Wt 141 lb (64 kg)   SpO2 98%   BMI 24.98 kg/m²   tm w afls but no erythema, purulence  Sinuses nontender but boggy mucosa, opp benign,.  No nodes  Lungs cta, heart showed rrr    Results for orders placed or performed in visit on 02/05/20   AMB POC RAPID INFLUENZA TEST   Result Value Ref Range    VALID INTERNAL CONTROL POC Yes     [employer] Influenza test Negative Negative     Assessment and plan:  1. URI/bronchitis. Sona Ricks and she will start using her alb, call if worsening sx      Above conditions discussed at length and patient vocalized understanding.   All questions answered to patient satisfaction

## 2020-02-05 NOTE — PROGRESS NOTES
Willow Ancelmo presents today for   Chief Complaint   Patient presents with    Cold Symptoms              Depression Screening:  3 most recent PHQ Screens 2/5/2020   Little interest or pleasure in doing things Not at all   Feeling down, depressed, irritable, or hopeless Not at all   Total Score PHQ 2 0       Learning Assessment:  Learning Assessment 5/31/2019   PRIMARY LEARNER Patient   HIGHEST LEVEL OF EDUCATION - PRIMARY LEARNER  > 4 YEARS [place] PRIMARY LEARNER NONE   CO-LEARNER CAREGIVER No   PRIMARY LANGUAGE ENGLISH    NEED -   LEARNER PREFERENCE PRIMARY READING     -   ANSWERED BY PATIENT   RELATIONSHIP SELF       Abuse Screening:  Abuse Screening Questionnaire 1/14/2019   Do you ever feel afraid of your partner? N   Are you in a relationship with someone who physically or mentally threatens you? N   Is it safe for you to go home? Y       Fall Risk  Fall Risk Assessment, last 12 mths 11/13/2019   Able to walk? Yes   Fall in past 12 months? No   Fall with injury? -   Number of falls in past 12 months -   Fall Risk Score -           Coordination of Care:  1. Have you been to the ER, urgent care clinic since your last visit? Hospitalized since your last visit? no    2. Have you seen or consulted any other health care providers outside of the [address] [place] [address] since your last visit? Include any pap smears or colon screening.  no

## 2020-02-09 DIAGNOSIS — F41.9 ANXIETY: ICD-10-CM

## 2020-02-11 DIAGNOSIS — F41.9 ANXIETY: ICD-10-CM

## 2020-02-11 RX ORDER — DIAZEPAM 5 MG/1
5 TABLET ORAL
Qty: 40 TAB | Refills: 1 | Status: SHIPPED | OUTPATIENT
Start: 2020-02-11 | End: 2020-05-12

## 2020-02-11 RX ORDER — DIAZEPAM 5 MG/1
5 TABLET ORAL
Qty: 40 TAB | Refills: 1 | Status: CANCELLED | OUTPATIENT
Start: 2020-02-11

## 2020-02-18 LAB — MAMMOGRAPHY, EXTERNAL: NORMAL

## 2020-03-27 ENCOUNTER — APPOINTMENT (OUTPATIENT)
Dept: INTERNAL MEDICINE CLINIC | Age: 70
End: 2020-03-27

## 2020-03-27 LAB
25(OH)D3 SERPL-MCNC: 42.8 NG/ML (ref 32–100)
AVG GLU, 10930: 154 MG/DL (ref 91–123)
CHOLEST SERPL-MCNC: 142 MG/DL (ref 110–200)
HBA1C MFR BLD HPLC: 7 % (ref 4.8–5.6)
HDLC SERPL-MCNC: 2.8 MG/DL (ref 0–5)
HDLC SERPL-MCNC: 50 MG/DL
LDL/HDL RATIO,LDHD: 1.4
LDLC SERPL CALC-MCNC: 69 MG/DL (ref 50–99)
NON-HDL CHOLESTEROL, 011976: 92 MG/DL
TRIGL SERPL-MCNC: 115 MG/DL (ref 40–149)
VLDLC SERPL CALC-MCNC: 23 MG/DL (ref 8–30)

## 2020-04-02 NOTE — PROGRESS NOTES
Consent: Madai Raman, who was seen by synchronous (real-time) audio-video technology, and/or her healthcare decision maker, is aware that this patient-initiated, Telehealth encounter on 4/3/2020 is a billable service, with coverage as determined by her insurance carrier. She is aware that she may receive a bill and has provided verbal consent to proceed: Yes. Assessment & Plan:   Diagnoses and all orders for this visit:    1. Uncontrolled type 2 diabetes mellitus  -     METABOLIC PANEL, COMPREHENSIVE; Future  -     HEMOGLOBIN A1C W/O EAG; Future  -     MICROALBUMIN, UR, RAND W/ MICROALB/CREAT RATIO; Future    2. Gastroesophageal reflux disease without esophagitis    3. Bronchiectasis with acute lower respiratory infection ([facility] [address]    4. History of breast cancer    5. Hyperlipidemia, unspecified hyperlipidemia type    6. Vitamin D deficiency  -     VITAMIN D, 25 HYDROXY; Future    7. Iron deficiency  -     CBC W/O DIFF; Future  -     IRON PROFILE; Future                I spent at least 15 minutes with this established patient, and >50% of the time was spent counseling and/or coordinating care regarding routine f/u  712  Subjective:   Madai Raman is a 81 y.o. female who was seen for No chief complaint on file. Objective:   Vital Signs: (As obtained by patient/caregiver at home)  There were no vitals taken for this visit.      [INSTRUCTIONS:  \"[x]\" Indicates a positive item  \"[]\" Indicates a negative item  -- DELETE ALL ITEMS NOT EXAMINED]    Constitutional: [x] Appears well-developed and well-nourished [x] No apparent distress      [] Abnormal -     Mental status: [x] Alert and awake  [x] Oriented to person/place/time [x] Able to follow commands    [] Abnormal -     Eyes:   EOM    [x]  Normal    [] Abnormal -   Sclera  [x]  Normal    [] Abnormal -          Discharge [x]  None visible   [] Abnormal -     HENT: [x] Normocephalic, atraumatic  [] Abnormal -   [x] Mouth/Throat: Mucous membranes are moist    External Ears [x] Normal  [] Abnormal -    Neck: [x] No visualized mass [] Abnormal -     Pulmonary/Chest: [x] Respiratory effort normal   [x] No visualized signs of difficulty breathing or respiratory distress        [] Abnormal -      Musculoskeletal:   [] Normal gait with no signs of ataxia         [x] Normal range of motion of neck        [] Abnormal -     Neurological:        [x] No Facial Asymmetry (Cranial nerve 7 motor function) (limited exam due to video visit)          [x] No gaze palsy        [] Abnormal -          Skin:        [x] No significant exanthematous lesions or discoloration noted on facial skin         [] Abnormal -            Psychiatric:       [x] Normal Affect [] Abnormal -        [x] No Hallucinations    Other pertinent observable physical exam findings:-  Asked to pt to press on sinuses and mastoid - no tenderness   minimal discomfort on movement of external ear, no clear nodes    We discussed the expected course, resolution and complications of the diagnosis(es) in detail. Medication risks, benefits, costs, interactions, and alternatives were discussed as indicated. I advised her to contact the office if her condition worsens, changes or fails to improve as anticipated. She expressed understanding with the diagnosis(es) and plan. Segundo Brewster is a 81 y.o. female being evaluated by a video visit encounter for concerns as above. A caregiver was present when appropriate. Due to this being a TeleHealth encounter ([facility] evaluation of the following organ systems was limited: Vitals/Constitutional/EENT/Resp/CV/GI//MS/Neuro/Skin/Heme-Lymph-Imm.   Pursuant to the emergency declaration under the [facility], [facility] waiver authority and the [employer] Act, this Virtual  Visit was conducted, with patient's (and/or legal guardian's) consent, to reduce the patient's risk of exposure to COVID-19 and provide necessary medical care. Services were provided through a video synchronous discussion virtually to substitute for in-person clinic visit. Patient and provider were located at their individual homes. Aba Wall MD      81 y.o. WHITE OR  female who presents for evaluation. She continues to tamox for RIGHT breast ca being followed by Dr Maria Guan 1-2x/year    Continues to do 10k steps daily on her counter, no cardiovascular complaints     Denies polyuria, polydipsia, nocturia, vision change. She's on [place] and [place] and no hypoglycemia episodes. She notes that the sugars have been running higher of late, in the mid 100s mostly, really since she got cortisone in the shoulder in December    Remains on PPI and iron supplementation, no GI complaints to report    Continues to see Dr Angela Alonzo and only using the advair prn      She does report a fullness in the right ear mostly; intermittent stabbing discomfort, no hearing loss, tinnitus, dizziness.   Denied any f, drainage, sinus or throat complaints    LAST MEDICARE WELLNESS EXAM: never as not medicare b    Past Medical History:   Diagnosis Date    ADD (attention deficit disorder) 2014    [facility] psych    Amebic colitis 1970s    Arthritis     s/p cortisone left knee    Breast cancer ([facility] [address] 02/2016    Dr Maria Guan; RIGHT partial mastectomy, adjuvant XRT    Bronchiectasis     Dr. Angela Alonzo; RLL    Diabetes mellitus ([facility] [address] 09/2013    on basis of elev a1c    Fibrocystic breast disease     Dr. Donato Hodge GERD (gastroesophageal reflux disease)     Dr. Tim Cevallos s/p dilation 2/12    H/O pulmonary function tests 2011    ratio 70, FEV1 81 no change postbd, TLC 93, RV 94, DLCO 81    Hyperlipidemia     Hypovitaminosis D     Insomnia     Iron deficiency anemia 05/2018    eval Dr Tim Cevallos showed gastritis    Left rotator cuff tear 2000s    conservative therapy    Osteopenia     DEXA t-score -1.2 spine, -0.2 hip ();  spine -1.2, hip 0.1 (10/10);  -1.8 spine, -0.3 FRAX 6.3/0.2 (); [facility] -1.7 spine, 0.1 hip ()    Overweight (BMI 25.0-29. 9)     IF  but unwilling to do; phentermine  start weight 145 lbs    Sinus tachycardia     Dr Christopher Qureshi    Spontaneous pneumothorax 1970s    x3    Zoster right S1 3/12     Past Surgical History:   Procedure Laterality Date    BREAST SURGERY PROCEDURE UNLISTED      Left nipple duct exploration and excisional biopsy    CARDIAC SURG PROCEDURE UNLIST      NST negative, ef 75%    CARDIAC SURG PROCEDURE UNLIST  10/2018    Dr Christopher Qureshi; 30d monitor neg    HX BREAST BIOPSY      Right Breast biopsy w/needle    HX BREAST LUMPECTOMY Right 2017    HX COLONOSCOPY      Dr. Dakota Hong negative ; diverticulosis  Dr Allison Rhodes; Dr Dakota Hong 17 neg    HX ENDOSCOPY  2018    Dr Dakota Hong; gastropathy, h pylori neg    HX HYSTERECTOMY  1993    fibroids/ovarian cysts - Dr. 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     Employer: [employer]   Social Needs    Financial resource strain: Not on file    Food insecurity     Worry: Not on file     Inability: Not on file   [employer] needs     Medical: Not on file     Non-medical: Not on file   Tobacco Use    Smoking status: Former Smoker     Packs/day: 1.00     Years: 5.00     Pack years: 5.00     Last attempt to quit: 1973     Years since quittin.7    Smokeless tobacco: Never Used   Substance and Sexual Activity    Alcohol use: No    Drug use: No    Sexual activity: Not on file   Lifestyle    Physical activity     Days per week: Not on file Minutes per session: Not on file    Stress: Not on file   Relationships    Social connections     Talks on phone: Not on file     Gets together: Not on file     Attends Anabaptist service: Not on file     Active member of club or organization: Not on file     Attends meetings of clubs or organizations: Not on file     Relationship status: Not on file    Intimate partner violence     Fear of current or ex partner: Not on file     Emotionally abused: Not on file     Physically abused: Not on file     Forced sexual activity: Not on file   Other Topics Concern    Not on file   Social History Narrative    Not on file     Current Outpatient Medications   Medication Sig    diazePAM (VALIUM) 5 mg tablet TAKE 1 TAB BY MOUTH EVERY TWELVE (12) HOURS AS NEEDED FOR ANXIETY. MAX DAILY AMOUNT: 10 MG.  pravastatin (PRAVACHOL) 40 mg tablet TAKE 1 TAB BY MOUTH BY MOUTH NIGHTLY.  diclofenac (VOLTAREN) 1 % gel Apply 4 g to affected area four (4) times daily.  JANUVIA 100 mg tablet TAKE 1 TABLET BY MOUTH DAILY.  ergocalciferol (ERGOCALCIFEROL) 50,000 unit capsule Take 1 Cap by mouth every seven (7) days.  dexlansoprazole (DEXILANT) 60 mg CpDB capsule (delayed release) Take  by mouth.  ADVAIR DISKUS 250-50 mcg/dose diskus inhaler TAKE 1 PUFF BY INHALATION TWO (2) TIMES A DAY. (Patient taking differently: TAKE 1 PUFF BY INHALATION as needed)    tamoxifen (NOLVADEX) 10 mg tablet Take  by mouth daily.  albuterol (VENTOLIN HFA) 90 mcg/actuation inhaler Take 2 Puffs by inhalation every six (6) hours as needed for Wheezing. Indications: BRONCHOSPASTIC PULMONARY DISEASE    b complex vitamins (B COMPLEX 1) tablet Take 1 Tab by mouth daily. No current facility-administered medications for this visit.       Allergies   Allergen Reactions    Actos [Pioglitazone] Other (comments)     Felt bad, weight gain    Avelox [Moxifloxacin] Other (comments)     Felt jittery    Crestor [Rosuvastatin] Myalgia    Evista [Raloxifene] Other (comments)     Severe flashes    Jardiance [Empagliflozin] Other (comments)     Recurring yeast infections    Lipitor [Atorvastatin] Other (comments)     Leg cramps and muscle aches    Macrobid [Nitrofurantoin Monohyd/M-Cryst] Hives    Metformin Other (comments)     Gi intolerance    Neuromuscular Blockers, Steroidal Other (comments)     \"patient feels crazy\"    Nsaids (Non-Steroidal Anti-Inflammatory Drug) Other (comments)     H/o gastropathy    Phentermine Other (comments)     Felt bad     REVIEW OF SYSTEMS: gyn 2012, mammo 2018, DEXA 4/17, colo 12/13 Dr Heidy Souza, Dr Jason Rodarte  Ophtho  no vision change or eye pain  Oral  no mouth pain, tongue or tooth problems  Ears  no hearing loss, ear pain, fullness, no swallowing problems  Cardiac  no CP, PND, orthopnea, edema, palpitations or syncope  Chest  no breast masses  Resp  no wheezing, chronic coughing, dyspnea  GI  no heartburn, nausea, vomiting, change in bowel habits, bleeding, hemorrhoids  Urinary  no dysuria, hematuria, flank pain, urgency, frequency    LABS  From 9/10 showed   gluc 106, cr 0.70, gfr>60, alt 42,                   chol 238, tg 154, hdl 54, ldl-c 153, wbc 6.5, hb 13.1, plt 258, ua neg  From 2/11 showed   gluc 95,   cr 0.90                                                                                                   wbc 7.0, hb 14.1, plt 308  From 3/12 showed   gluc 103, cr 0.90              alt 43,                                                                           wbc 6.2, hb 13.7, plt 285  From 3/13 showed   gluc 130, cr 0.60,             alt 22,                    chol 244, tg 180, hdl 57, ldl-c 151,                                          ua neg,     tsh 1.79  From 9/13 showed   gluc 119, cr 0.70, gfr>60,            hba1c 6.6, chol 228, tg 145, hdl 62, ldl-c 137, 2hr   From 2/14 showed   gluc 95,   cr 0.60, gfr>61, alt 20, hba1c 5.7,                    umar 2.8  From 4/14 showed   gluc 144, cr 0.98, gfr>60, alt 31,          wbc 7.6, hb 13.4, plt 317,                  ck/trop neg  From 10/14 showed gluc 98,   cr 0.60, gfr>60, alt 20,          chol 163, tg 96,   hdl 64, ldl-c 80  From 4/15 showed   gluc 96,   cr 0.70, gfr>60, alt 19, hba1c 6.1, chol 207, tg 126, hdl 68, ldl-c 114  From 10/15 showed        hba1c 6.3, chol 188, tg 118, hdl 66, ldl-c 98,   wbc 5.3, hb 13.3, plt 287, umar neg  From 4/16 showed   gluc 98,   cr 0.60, gfr>60, alt 19, hba1c 6.3, chol 170, tg 141, hdl 57, ldl-c 85  From 10/16 showed gluc 137, cr 0.50, gfr>60, alt 17, hba1c 6.2, chol 176, tg 302, hdl 54, ldl-c 62,   wbc 8.9, hb 12.3, plt 393, umar neg  From 1/17 showed   gluc 114, cr 0.60, gfr>60,           wbc 6.1, hb 12.9, plt 356  From 4/17 showed        hba1c 6.8, chol 223, tg 240, hdl 53, ldl-c 122  From 9/17 showed   gluc 109, cr 0.70, gfr>60, alt 16, hba1c 6.7,                  umar neg  From 1/18 showed   gluc 104, cr 0.60, gfr>60, alt 14, hba1c 6.8, chol 154, tg 197, hdl 60, ldl-c 55  From 5/18 showed        hba1c 7.0,          wbc 5.7, hb 11.3, plt 281, umar neg, fe 43, %sat 11, ferritin 7,   b12 588, fol 18.6, spep neg, retic 1.6  From 9/18 showed   gluc 143, cr 0.70, gfr>60,    hba1c 7.0, chol 155, tg 199, hdl 56, ldl-c 59  From 1/19 showed                               fe 80, %sat 28, ferritin 28, vit d 37.1  From 5/19 showed   gluc 155, cr 0.70, gfr>60, alt 22, hba1c 7.4, chol 139, tg 172, hdl 49, ldl-c 55,   wbc 6.0, hb 13.3, plt 214, umar neg  From 10/19 showed        hba1c 6.8,                  umar 9.8,  fe 79, %sat 29,     vit d 48.7    Results for orders placed or performed in visit on 04/01/20   LIPID PANEL   Result Value Ref Range    Triglyceride 115 40 - 149 mg/dL    HDL Cholesterol 50 >=40 mg/dL    Cholesterol, total 142 110 - 200 mg/dL    CHOLESTEROL/HDL 2.8 0.0 - 5.0    Non-HDL Cholesterol 92 <130 mg/dL    LDL, calculated 69 50 - 99 mg/dL    VLDL, calculated 23 8 - 30 mg/dL    LDL/HDL Ratio 1.4      Patient Active Problem List   Diagnosis Code    Bronchiectasis Dr. Julio Braxton J47.9    Osteopenia  M85.80    Hyperlipidemia E78.5    Gastroesophageal reflux disease without esophagitis K21.9    Uncontrolled type 2 diabetes mellitus E11.65    History of breast cancer Z85.3    Vitamin D deficiency E55.9    Overweight (BMI 25.0-29. 9) E66.3     Assessment and plan:  1. Pulm. F/U Dr Erickson Bonilla and continue current  2. GERD. Continue ppi and avoidance measures  3. Osteopenia. Ca/d, wt bearing exercises  4. DM. Continue current regimen; we discussed possibly adding on the next med if trend continues  5. Lipids. Continue current regimen, vascepa once covered  6  Breast ca. Per Dr Park García  7. Iron def anemia. Continue supplementation and recheck levels in next visit  8. Overweight. Lifestyle and dietary measures. Portion control reiterated. 9. Eustachian tube dysfunction? Try antihis/decong; call if progressive sx for abx        RTC 10/20    Above conditions discussed at length and patient vocalized understanding.   All questions answered to patient satisfaction

## 2020-04-03 ENCOUNTER — VIRTUAL VISIT (OUTPATIENT)
Dept: INTERNAL MEDICINE CLINIC | Age: 70
End: 2020-04-03

## 2020-04-03 DIAGNOSIS — Z85.3 HISTORY OF BREAST CANCER: ICD-10-CM

## 2020-04-03 DIAGNOSIS — J47.0 BRONCHIECTASIS WITH ACUTE LOWER RESPIRATORY INFECTION (HCC): ICD-10-CM

## 2020-04-03 DIAGNOSIS — E78.5 HYPERLIPIDEMIA, UNSPECIFIED HYPERLIPIDEMIA TYPE: ICD-10-CM

## 2020-04-03 DIAGNOSIS — E61.1 IRON DEFICIENCY: ICD-10-CM

## 2020-04-03 DIAGNOSIS — K21.9 GASTROESOPHAGEAL REFLUX DISEASE WITHOUT ESOPHAGITIS: ICD-10-CM

## 2020-04-03 DIAGNOSIS — E55.9 VITAMIN D DEFICIENCY: ICD-10-CM

## 2020-04-03 DIAGNOSIS — F41.9 ANXIETY: ICD-10-CM

## 2020-04-03 RX ORDER — DIAZEPAM 5 MG/1
5 TABLET ORAL
Qty: 40 TAB | Refills: 1 | Status: CANCELLED | OUTPATIENT
Start: 2020-04-03

## 2020-04-03 NOTE — TELEPHONE ENCOUNTER
Patient still has 1 refill at the pharmacy for Valium. I contacted the pharmacy to confirm and requested they refill it for the patient. No further action needed.

## 2020-04-06 RX ORDER — DEXLANSOPRAZOLE 60 MG/1
60 CAPSULE, DELAYED RELEASE ORAL DAILY
Qty: 90 CAP | Refills: 3 | Status: SHIPPED | OUTPATIENT
Start: 2020-04-06 | End: 2021-05-30

## 2020-04-06 NOTE — TELEPHONE ENCOUNTER
This was previously prescribed by Dr. Juliana Willett. Please sign if appropriate. Last Visit: 2/5/20 with MD Nikita Carter  Next Appointment: 10/9/20 with MD Nikita Carter    Requested Prescriptions     Pending Prescriptions Disp Refills    dexlansoprazole (Dexilant) 60 mg CpDB capsule (delayed release) 90 Cap 1     Sig: Take 1 Cap by mouth daily.

## 2020-04-11 ENCOUNTER — HOSPITAL ENCOUNTER (EMERGENCY)
Age: 70
Discharge: HOME | End: 2020-04-11
Attending: EMERGENCY MEDICINE
Payer: COMMERCIAL

## 2020-04-11 ENCOUNTER — HOSPITAL ENCOUNTER (EMERGENCY)
Dept: CT IMAGING | Age: 70
Discharge: HOME | End: 2020-04-11
Attending: EMERGENCY MEDICINE
Payer: COMMERCIAL

## 2020-04-11 VITALS
HEART RATE: 101 BPM | WEIGHT: 140 LBS | OXYGEN SATURATION: 97 % | SYSTOLIC BLOOD PRESSURE: 156 MMHG | RESPIRATION RATE: 16 BRPM | HEIGHT: 63 IN | TEMPERATURE: 97.5 F | BODY MASS INDEX: 24.8 KG/M2 | DIASTOLIC BLOOD PRESSURE: 77 MMHG

## 2020-04-11 DIAGNOSIS — S80.10XA CONTUSION OF LOWER LEG, UNSPECIFIED LATERALITY, INITIAL ENCOUNTER: ICD-10-CM

## 2020-04-11 DIAGNOSIS — S09.90XA CLOSED HEAD INJURY, INITIAL ENCOUNTER: Primary | ICD-10-CM

## 2020-04-11 PROCEDURE — 99283 EMERGENCY DEPT VISIT LOW MDM: CPT

## 2020-04-11 PROCEDURE — 70486 CT MAXILLOFACIAL W/O DYE: CPT

## 2020-04-11 PROCEDURE — 74011250637 HC RX REV CODE- 250/637: Performed by: EMERGENCY MEDICINE

## 2020-04-11 PROCEDURE — 70450 CT HEAD/BRAIN W/O DYE: CPT

## 2020-04-11 PROCEDURE — 72125 CT NECK SPINE W/O DYE: CPT

## 2020-04-11 RX ORDER — TRAMADOL HYDROCHLORIDE 50 MG/1
50 TABLET ORAL
Qty: 10 TAB | Refills: 0 | Status: SHIPPED | OUTPATIENT
Start: 2020-04-11 | End: 2020-04-14 | Stop reason: CLARIF

## 2020-04-11 RX ORDER — TRAMADOL HYDROCHLORIDE 50 MG/1
50 TABLET ORAL
Status: COMPLETED | OUTPATIENT
Start: 2020-04-11 | End: 2020-04-11

## 2020-04-11 RX ADMIN — TRAMADOL HYDROCHLORIDE 50 MG: 50 TABLET, FILM COATED ORAL at 15:18

## 2020-04-11 NOTE — DISCHARGE INSTRUCTIONS
Contusion: Care Instructions  Your Care Instructions  Contusion is the medical term for a bruise. It is the result of a direct blow or an impact, such as a fall. Contusions are common sports injuries. Most people think of a bruise as a black-and-blue spot. This happens when small blood vessels get torn and leak blood under the skin. But bones, muscles, and organs can also get bruised. This may damage deep tissues but not cause a bruise you can see. The doctor will do a physical exam to find the location of your contusion. You may also have tests to make sure you do not have a more serious injury, such as a broken bone or nerve damage. These may include X-rays or other imaging tests like a CT scan or MRI. Deep-tissue contusions may cause pain and swelling. But if there is no serious damage, they will often get better in a few weeks with home treatment. The doctor has checked you carefully, but problems can develop later. If you notice any problems or new symptoms, get medical treatment right away. Follow-up care is a key part of your treatment and safety. Be sure to make and go to all appointments, and call your doctor if you are having problems. It's also a good idea to know your test results and keep a list of the medicines you take. How can you care for yourself at home? · Put ice or a cold pack on the sore area for 10 to 20 minutes at a time to stop swelling. Put a thin cloth between the ice pack and your skin. · Be safe with medicines. Read and follow all instructions on the label. ¨ If the doctor gave you a prescription medicine for pain, take it as prescribed. ¨ If you are not taking a prescription pain medicine, ask your doctor if you can take an over-the-counter medicine. · If you can, prop up the sore area on pillows as much as possible for the next few days. Try to keep the sore area above the level of your heart. When should you call for help?   Call your doctor now or seek immediate medical care if:  · Your pain gets worse. · You have new or worse swelling. · You have tingling, weakness, or numbness in the area near the contusion. · The area near the contusion is cold or pale. Watch closely for changes in your health, and be sure to contact your doctor if:  · You do not get better as expected. Where can you learn more? Go to MyMosa[employer]be  Enter [id number] in the search box to learn more about \"Contusion: Care Instructions. \"   © 8677-6123 [employer]. Care instructions adapted under license by [employer] (which disclaims liability or warranty for this information). This care instruction is for use with your licensed healthcare professional. If you have questions about a medical condition or this instruction, always ask your healthcare professional. [address] any warranty or liability for your use of this information. Content Version: 49.1.332469; Current as of: May 22, 2015      Patient Education          Learning About a Closed Head Injury  What is a closed head injury? A closed head injury happens when your head gets hit hard. The strong force of the blow causes your brain to shake in your skull. This movement can cause the brain to bruise, swell, or tear. Sometimes nerves or blood vessels also get damaged. This can cause bleeding in or around the brain. A concussion is a type of closed head injury. What are the symptoms? If you have a mild concussion, you may have a mild headache or feel \"not quite right. \" These symptoms are common. They usually go away over a few days to 4 weeks. But sometimes after a concussion, you feel like you can't function as well as before the injury. And you have new symptoms. This is called postconcussive syndrome. You may:  · Find it harder to solve problems, think, concentrate, or remember. · Have headaches.   · Have changes in your sleep patterns, such as not being able to sleep or sleeping all the time.  · Have changes in your personality. · Not be interested in your usual activities. · Feel angry or anxious without a clear reason. · Lose your sense of taste or smell. · Be dizzy, lightheaded, or unsteady. It may be hard to stand or walk. How is a closed head injury treated? Any person who may have a concussion needs to see a doctor. Some people have to stay in the hospital to be watched. Others can go home safely. If you go home, follow your doctor's instructions. He or she will tell you if you need someone to watch you closely for the next 24 hours or longer. Rest is the best treatment. Get plenty of sleep at night. And try to rest during the day. · Avoid activities that are physically or mentally demanding. These include housework, exercise, and schoolwork. And don't play video games, send text messages, or use the computer. You may need to change your school or work schedule to be able to avoid these activities. · Ask your doctor when it's okay to drive, ride a bike, or operate machinery. · Take an over-the-counter pain medicine, such as acetaminophen (Tylenol), ibuprofen (Advil, Motrin), or naproxen (Aleve). Be safe with medicines. Read and follow all instructions on the label. · Check with your doctor before you use any other medicines for pain. · Do not drink alcohol or use illegal drugs. They can slow recovery. They can also increase your risk of getting a second head injury. Follow-up care is a key part of your treatment and safety. Be sure to make and go to all appointments, and call your doctor if you are having problems. It's also a good idea to know your test results and keep a list of the medicines you take. Where can you learn more? Go to http://cindy-lex.info/  Enter E235 in the search box to learn more about \"Learning About a Closed Head Injury. \"  Current as of: November 19, 2019Content Version: 12.4  © 8849-8096 [employer].   Care instructions adapted under license by [employer] (which disclaims liability or warranty for this information). If you have questions about a medical condition or this instruction, always ask your healthcare professional. [address] any warranty or liability for your use of this information.

## 2020-04-11 NOTE — ED TRIAGE NOTES
Patient states that she was in local store when she struck a pallet with her right leg causing her to fall and strike right side of head into floor. She denies LOC. Small abrasion noted to right anterior lower leg. Swelling noted to right frontal head.

## 2020-04-11 NOTE — ED PROVIDER NOTES
EMERGENCY DEPARTMENT HISTORY AND PHYSICAL EXAM    1:51 PM      Date: 4/11/2020  Patient Name: Phuong Davis    History of Presenting Illness     Chief Complaint   Patient presents with    Fall    Leg Injury    Head Injury         History Provided By: Patient    Additional History (Context): Phuong Davis is a 81 y.o. female with diabetes and malignancy who presents status post fall over a hand truck. Patient states she was at a store and did not see a hand truck on the [place]. She fell over the truck injuring bilateral shin areas and fell on her right frontal head. Patient states she has mild blurry vision on the right eye. And mild facial numbness around injured area. Patient denies chest pain, fever, cough, nausea, vomiting or diarrhea. Patient denies smoking, alcohol or recreational drug use. PCP: Radha Palencia MD      Current Facility-Administered Medications   Medication Dose Route Frequency Provider Last Rate Last Dose    traMADoL (ULTRAM) tablet 50 mg  50 mg Oral NOW Carlo Springer,          Current Outpatient Medications   Medication Sig Dispense Refill    dexlansoprazole (Dexilant) 60 mg CpDB capsule (delayed release) Take 1 Cap by mouth daily. 90 Cap 3    diazePAM (VALIUM) 5 mg tablet TAKE 1 TAB BY MOUTH EVERY TWELVE (12) HOURS AS NEEDED FOR ANXIETY. MAX DAILY AMOUNT: 10 MG. 40 Tab 1    pravastatin (PRAVACHOL) 40 mg tablet TAKE 1 TAB BY MOUTH BY MOUTH NIGHTLY. 90 Tab 3    diclofenac (VOLTAREN) 1 % gel Apply 4 g to affected area four (4) times daily. 500 g 2    JANUVIA 100 mg tablet TAKE 1 TABLET BY MOUTH DAILY. 90 Tab 3    ergocalciferol (ERGOCALCIFEROL) 50,000 unit capsule Take 1 Cap by mouth every seven (7) days. 13 Cap 3    ADVAIR DISKUS 250-50 mcg/dose diskus inhaler TAKE 1 PUFF BY INHALATION TWO (2) TIMES A DAY. (Patient taking differently: TAKE 1 PUFF BY INHALATION as needed) 1 Inhaler 5    tamoxifen (NOLVADEX) 10 mg tablet Take  by mouth daily.       albuterol (VENTOLIN HFA) 90 mcg/actuation inhaler Take 2 Puffs by inhalation every six (6) hours as needed for Wheezing. Indications: BRONCHOSPASTIC PULMONARY DISEASE 1 Inhaler 3    b complex vitamins (B COMPLEX 1) tablet Take 1 Tab by mouth daily. Past History     Past Medical History:  Past Medical History:   Diagnosis Date    ADD (attention deficit disorder) 2014    [facility] psych    Amebic colitis 1970s    Arthritis     s/p cortisone left knee    Breast cancer ([facility] [address] 02/2016    Dr Lily Vidal; RIGHT partial mastectomy, adjuvant XRT    Bronchiectasis     Dr. Kalie Connelly; RLL    Concussion     Diabetes mellitus ([facility] [address] 09/2013    on basis of elev a1c    Fibrocystic breast disease     Dr. Shane Palacios GERD (gastroesophageal reflux disease)     Dr. Luwana Halsted s/p dilation 2/12    H/O pulmonary function tests 2011    ratio 70, FEV1 81 no change postbd, TLC 93, RV 94, DLCO 81    Hyperlipidemia     Hypovitaminosis D     Insomnia     Iron deficiency anemia 05/2018    eval Dr Luwana Halsted showed gastritis    Left rotator cuff tear 2000s    conservative therapy    Osteopenia     DEXA t-score -1.2 spine, -0.2 hip (4/08);  spine -1.2, hip 0.1 (10/10);  -1.8 spine, -0.3 FRAX 6.3/0.2 (7/13); [facility] -1.7 spine, 0.1 hip (4/17)    Overweight (BMI 25.0-29. 9)     IF 5/18 but unwilling to do; phentermine 1/19 start weight 145 lbs    Sinus tachycardia 2018    Dr Daniel Freire    Spontaneous pneumothorax 1970s    x3    Zoster right S1 3/12       Past Surgical History:  Past Surgical History:   Procedure Laterality Date    BREAST SURGERY PROCEDURE UNLISTED  2/11    Left nipple duct exploration and excisional biopsy    CARDIAC SURG PROCEDURE UNLIST  4/14    NST negative, ef 75%    CARDIAC SURG PROCEDURE UNLIST  10/2018    Dr Daniel Freire; 30d monitor neg    HX BREAST BIOPSY  4/12    Right Breast biopsy w/needle    HX BREAST LUMPECTOMY Right 02/14/2017    HX COLONOSCOPY      Dr. Luwana Halsted negative 2005; diverticulosis 12/13 Dr Arlet May;  Marta Clemons 17 neg    HX ENDOSCOPY  2018    Dr Marta Clemons; gastropathy, h pylori neg    HX HYSTERECTOMY  1993    fibroids/ovarian cysts - Dr. Jerson Pink HX ORTHOPAEDIC  2017    Dr Thomas Morel; left radial fx, left scaphoid fx after fall       Family History:  Family History   Problem Relation Age of Onset   Jose Daniel Loser Breast Cancer Mother     Ovarian Cancer Mother     Diabetes Father     Stroke Father     Diabetes Sister     Breast Cancer Maternal Grandmother     Breast Cancer Paternal Grandmother     Breast Cancer Paternal Aunt        Social History:  Social History     Tobacco Use    Smoking status: Former Smoker     Packs/day: 1.00     Years: 5.00     Pack years: 5.00     Last attempt to quit: 1973     Years since quittin.7    Smokeless tobacco: Never Used   Substance Use Topics    Alcohol use: No    Drug use: No       Allergies: Allergies   Allergen Reactions    Actos [Pioglitazone] Other (comments)     Felt bad, weight gain    Avelox [Moxifloxacin] Other (comments)     Felt jittery    Crestor [Rosuvastatin] Myalgia    Evista [Raloxifene] Other (comments)     Severe flashes    Jardiance [Empagliflozin] Other (comments)     Recurring yeast infections    Lipitor [Atorvastatin] Other (comments)     Leg cramps and muscle aches    Macrobid [Nitrofurantoin Monohyd/M-Cryst] Hives    Metformin Other (comments)     Gi intolerance    Neuromuscular Blockers, Steroidal Other (comments)     \"patient feels crazy\"    Nsaids (Non-Steroidal Anti-Inflammatory Drug) Other (comments)     H/o gastropathy    Phentermine Other (comments)     Felt bad         Review of Systems       Review of Systems   Constitutional: Negative. Negative for chills, diaphoresis and fever. HENT: Negative. Negative for congestion, rhinorrhea and sore throat. Eyes: Negative. Negative for pain, discharge and redness. Respiratory: Negative. Negative for cough, chest tightness, shortness of breath and wheezing. Cardiovascular: Negative. Negative for chest pain. Gastrointestinal: Negative. Negative for abdominal pain, constipation, diarrhea, nausea and vomiting. Genitourinary: Negative. Negative for dysuria, flank pain, frequency, hematuria and urgency. Musculoskeletal: Negative. Negative for back pain and neck pain. Skin: Negative. Negative for rash. Neurological: Negative. Negative for syncope, weakness, numbness and headaches. Psychiatric/Behavioral: Negative. All other systems reviewed and are negative. Physical Exam     Visit Vitals  /77 (BP 1 Location: Left arm, BP Patient Position: At rest)   Pulse (!) 101   Temp 97.5 °F (36.4 °C)   Resp 16   Ht 5' 3\" (1.6 m)   Wt 63.5 kg (140 lb)   SpO2 97%   BMI 24.80 kg/m²         Physical Exam  Vitals signs and nursing note reviewed. Constitutional:       General: She is not in acute distress. Appearance: Normal appearance. She is well-developed. She is not ill-appearing, toxic-appearing or diaphoretic. HENT:      Head: Normocephalic and atraumatic. Mouth/Throat:      Pharynx: No oropharyngeal exudate. Eyes:      General: No scleral icterus. Conjunctiva/sclera: Conjunctivae normal.      Pupils: Pupils are equal, round, and reactive to light. Neck:      Musculoskeletal: Normal range of motion and neck supple. Thyroid: No thyromegaly. Vascular: No hepatojugular reflux or JVD. Trachea: No tracheal deviation. Cardiovascular:      Rate and Rhythm: Normal rate and regular rhythm. Pulses: Normal pulses. Radial pulses are 2+ on the right side and 2+ on the left side. Dorsalis pedis pulses are 2+ on the right side and 2+ on the left side. Heart sounds: Normal heart sounds, S1 normal and S2 normal. No murmur. No gallop. No S3 or S4 sounds. Pulmonary:      Effort: Pulmonary effort is normal. No respiratory distress. Breath sounds: Normal breath sounds.  No decreased breath sounds, wheezing, rhonchi or rales. Abdominal:      General: Bowel sounds are normal. There is no distension. Palpations: Abdomen is soft. Abdomen is not rigid. There is no mass. Tenderness: There is no abdominal tenderness. There is no guarding or rebound. Negative signs include Hoover's sign and McBurney's sign. Musculoskeletal: Normal range of motion. Legs:       Comments: Strength 5 out of 5 throughout. Lymphadenopathy:      Head:      Right side of head: No submental, submandibular, preauricular or occipital adenopathy. Left side of head: No submental, submandibular, preauricular or occipital adenopathy. Cervical: No cervical adenopathy. Upper Body:      Right upper body: No supraclavicular adenopathy. Left upper body: No supraclavicular adenopathy. Skin:     General: Skin is warm and dry. Findings: No rash. Neurological:      Mental Status: She is alert. She is not disoriented. GCS: GCS eye subscore is 4. GCS verbal subscore is 5. GCS motor subscore is 6. Cranial Nerves: No cranial nerve deficit. Sensory: No sensory deficit. Coordination: Coordination normal.      Gait: Gait normal.      Deep Tendon Reflexes: Reflexes are normal and symmetric. Comments: Grossly intact. Psychiatric:         Speech: Speech normal.         Behavior: Behavior normal.         Thought Content: Thought content normal.         Judgment: Judgment normal.           Diagnostic Study Results     Labs -  No results found for this or any previous visit (from the past 12 hour(s)). Radiologic Studies -   CT HEAD WO CONT   Final Result   IMPRESSION[de-identified]   1.  No acute intracranial pathology. Thank you for this referral.      CT MAXILLOFACIAL WO CONT   Final Result   IMPRESSION:      1. Right supraorbital soft tissue contusion, but no acute facial fracture      Thank you for this referral.      CT SPINE CERV WO CONT   Final Result   IMPRESSION[de-identified]      1. No acute traumatic finding of cervical spine. 2. Mild to moderate degenerative findings, primarily facet arthropathy, as   discussed above. Thank you for this referral.               Medical Decision Making   Provider Notes (Medical Decision Making):  MDM  Number of Diagnoses or Management Options  Diagnosis management comments: Fall  Contusion legs b/l  Head injury         I am the first provider for this patient. I reviewed the vital signs, available nursing notes, past medical history, past surgical history, family history and social history. Vital Signs-Reviewed the patient's vital signs. Records Reviewed: Nursing Notes (Time of Review: 1:51 PM)    ED Course: Progress Notes, Reevaluation, and Consults:    Reviewed and discussed with patient. 2:51 PM 4/11/2020      Diagnosis       I have reassessed the patient. Patient is feeling well. Patient will be prescribed Ultram.  Patient was discharged in stable condition. Patient is to return to emergency department if any new or worsening condition. Clinical Impression:   1. Closed head injury, initial encounter    2. Contusion of lower leg, unspecified laterality, initial encounter        Disposition: Discharged home     Follow-up Information     Follow up With Specialties Details Why Contact Info    August Conroy MD Internal Medicine In 2 days  [address]  [phone]               Attestation        Provider Attestation:     I personally performed the services described in the documentation, reviewed the documentation and it accurately and completely records my words and actions utilizing the [address] [place] April 11, 2020 at 2:51 PM - [place], [address]. It is dictated using utilizing voice recognition software. Unfortunately this leads to occasional typographical errors. I apologize in advance if the situation occurs.   If questions arise please do not hesitate to contact me or call our department.

## 2020-04-14 ENCOUNTER — VIRTUAL VISIT (OUTPATIENT)
Dept: INTERNAL MEDICINE CLINIC | Age: 70
End: 2020-04-14

## 2020-04-14 DIAGNOSIS — R51.9 NONINTRACTABLE HEADACHE, UNSPECIFIED CHRONICITY PATTERN, UNSPECIFIED HEADACHE TYPE: Primary | ICD-10-CM

## 2020-04-14 RX ORDER — NORTRIPTYLINE HYDROCHLORIDE 10 MG/1
10 CAPSULE ORAL
Qty: 30 CAP | Refills: 1 | Status: SHIPPED | OUTPATIENT
Start: 2020-04-14 | End: 2020-05-07 | Stop reason: SDUPTHER

## 2020-04-14 RX ORDER — TRAMADOL HYDROCHLORIDE 50 MG/1
50 TABLET ORAL
Qty: 20 TAB | Refills: 0 | Status: SHIPPED | OUTPATIENT
Start: 2020-04-14 | End: 2020-04-21

## 2020-04-14 NOTE — PROGRESS NOTES
Consent: Luis Hunt, who was seen by synchronous (real-time) audio-video technology, and/or her healthcare decision maker, is aware that this patient-initiated, Telehealth encounter on 4/14/2020 is a billable service, with coverage as determined by her insurance carrier. She is aware that she may receive a bill and has provided verbal consent to proceed: Yes. Assessment & Plan:   Diagnoses and all orders for this visit:    1. Nonintractable headache, unspecified chronicity pattern, unspecified headache type  -     nortriptyline (PAMELOR) 10 mg capsule; Take 1 Cap by mouth nightly. -     traMADoL (ULTRAM) 50 mg tablet; Take 1 Tab by mouth every six (6) hours as needed for Pain for up to 7 days. Max Daily Amount: 200 mg. I spent at least 10 minutes with this established patient, and >50% of the time was spent counseling and/or coordinating care regarding f/u ER visit  712  Subjective:   Luis Hunt is a 81 y.o. female who was seen for Hospital Follow Up (Closed head injury and Contusion of lower leg,)    She is following-up on the ER visit from 4/11/20. She was in the store and tripped over a hand truck in the [place]. She bruised her shins and hit the right anterior forehead on the floor. No loc, focal neuro complaints. Exam and CT unremarkable in the ER outside of soft tissue contusions. She was sent home on tramadol which she never got apparently. Reports headache anywhere from 0-6/10 although overall improving. No focal neuro complaints, feels a little woozy though with rapid changes in position. No amnesia, confusion, disorientation, diffuculty w adls    Objective: There were no vitals taken for this visit.    General: alert, cooperative, no distress   Mental  status: normal mood, behavior, speech, dress, motor activity, and thought processes, able to follow commands   HENT: NCAT   Neck: no visualized mass   Resp: no respiratory distress   Neuro: no gross deficits Skin: no discoloration or lesions of concern on visible areas   Psychiatric: normal affect, consistent with stated mood, no evidence of hallucinations     Additional exam findings:       Assessment and plan:  1. Post traumatic headache. Trial pamelor after discussing possible sfx, also tramadol prn. She will call w update. Back to ER if acutely worsening sx      Above conditions discussed at length and patient vocalized understanding. All questions answered to patient satisfaction    We discussed the expected course, resolution and complications of the diagnosis(es) in detail. Medication risks, benefits, costs, interactions, and alternatives were discussed as indicated. I advised her to contact the office if her condition worsens, changes or fails to improve as anticipated. She expressed understanding with the diagnosis(es) and plan. Aurelio Fermin is a 81 y.o. female being evaluated by a video visit encounter for concerns as above. A caregiver was present when appropriate. Due to this being a TeleHealth encounter (During QJDPL-86 public health emergency), evaluation of the following organ systems was limited: Vitals/Constitutional/EENT/Resp/CV/GI//MS/Neuro/Skin/Heme-Lymph-Imm. Pursuant to the emergency declaration under the [facility] waiver authority and the [employer] General Act, this Virtual  Visit was conducted, with patient's (and/or legal guardian's) consent, to reduce the patient's risk of exposure to COVID-19 and provide necessary medical care. Services were provided through a video synchronous discussion virtually to substitute for in-person clinic visit. Patient and provider were located at their individual homes.         Daniel Gao MD

## 2020-04-14 NOTE — PROGRESS NOTES
Arben Vizcaino presents today for   Chief Complaint   Patient presents with   [facility] Follow Up     Closed head injury, initial encounter               Depression Screening:  3 most recent PHQ Screens 2/5/2020   Little interest or pleasure in doing things Not at all   Feeling down, depressed, irritable, or hopeless Not at all   Total Score PHQ 2 0       Learning Assessment:  Learning Assessment 5/31/2019   PRIMARY LEARNER Patient   HIGHEST LEVEL OF EDUCATION - PRIMARY LEARNER  > 4 YEARS [facility] PRIMARY LEARNER NONE   CO-LEARNER CAREGIVER No   PRIMARY LANGUAGE ENGLISH    NEED -   LEARNER PREFERENCE PRIMARY READING     -   ANSWERED BY PATIENT   RELATIONSHIP SELF       Abuse Screening:  Abuse Screening Questionnaire 4/14/2020   Do you ever feel afraid of your partner? N   Are you in a relationship with someone who physically or mentally threatens you? N   Is it safe for you to go home? Y       Fall Risk  Fall Risk Assessment, last 12 mths 4/14/2020   Able to walk? Yes   Fall in past 12 months? Yes   Fall with injury? Yes   Number of falls in past 12 months 1   Fall Risk Score 2           Coordination of Care:  1. Have you been to the ER, urgent care clinic since your last visit? Hospitalized since your last visit? Yes 4/11/20 [facility]    2. Have you seen or consulted any other health care providers outside of the [address] [place] [address] [place] since your last visit? Include any pap smears or colon screening.   no

## 2020-04-23 ENCOUNTER — TELEPHONE (OUTPATIENT)
Dept: INTERNAL MEDICINE CLINIC | Age: 70
End: 2020-04-23

## 2020-04-23 ENCOUNTER — VIRTUAL VISIT (OUTPATIENT)
Dept: INTERNAL MEDICINE CLINIC | Age: 70
End: 2020-04-23

## 2020-04-23 DIAGNOSIS — G44.309 POST-TRAUMATIC HEADACHE, NOT INTRACTABLE, UNSPECIFIED CHRONICITY PATTERN: ICD-10-CM

## 2020-04-23 DIAGNOSIS — H66.90 ACUTE OTITIS MEDIA, UNSPECIFIED OTITIS MEDIA TYPE: Primary | ICD-10-CM

## 2020-04-23 RX ORDER — AMOXICILLIN AND CLAVULANATE POTASSIUM 875; 125 MG/1; MG/1
1 TABLET, FILM COATED ORAL 2 TIMES DAILY
Qty: 20 TAB | Refills: 0 | Status: SHIPPED | OUTPATIENT
Start: 2020-04-23 | End: 2020-05-03

## 2020-04-23 NOTE — TELEPHONE ENCOUNTER
Having trouble again with her right ear, pain, stopped up. Tried antihistamine and drops without relief. She is calling back about getting an antibiotic. Please advise.

## 2020-04-23 NOTE — PROGRESS NOTES
Consent: Albin Henry, who was seen by synchronous (real-time) audio-video technology, and/or her healthcare decision maker, is aware that this patient-initiated, Telehealth encounter on 4/23/2020 is a billable service, with coverage as determined by her insurance carrier. She is aware that she may receive a bill and has provided verbal consent to proceed: Yes. Assessment & Plan:   Diagnoses and all orders for this visit:    1. Acute otitis media, unspecified otitis media type  -     amoxicillin-clavulanate (AUGMENTIN) 875-125 mg per tablet; Take 1 Tab by mouth two (2) times a day for 10 days. 2. Post-traumatic headache, not intractable, unspecified chronicity pattern                I spent at least 15 minutes with this established patient, and >50% of the time was spent counseling and/or coordinating care regarding otitis  712  Subjective:   Albin Henry is a 81 y.o. female who was seen for No chief complaint on file. 81 y.o. WHITE OR  female who presents for evaluation. She's had vague fullness and discomfort around the right ear area for 2-3 weeks. No f, sinus sx, sore throat, cough. The last few days, she's noted more actual pain in the ear itself, no drainage, hearing loss, ringing, dizziness, n/v.  Been using ear drops and antihistamines but no help.     Past Medical History:   Diagnosis Date    ADD (attention deficit disorder) 2014    [facility] psych    Amebic colitis 1970s    Arthritis     s/p cortisone LEFT knee    Breast cancer ([facility] [address] 02/2016    Dr Gaye Ferreira; RIGHT partial mastectomy, adjuvant XRT    Bronchiectasis     Dr. James Celis; RLL    Concussion     Diabetes mellitus ([facility] [address] 09/2013    on basis of elev a1c    Fibrocystic breast disease     Dr. Ivanna Mazariegos GERD (gastroesophageal reflux disease)     Dr. Mango Winkler s/p dilation 2/12    H/O pulmonary function tests 2011    ratio 70, FEV1 81 no change postbd, TLC 93, RV 94, DLCO 81    Hyperlipidemia     Hypovitaminosis D     Insomnia     Iron deficiency anemia 05/2018    eval Dr Juliana Willett showed gastritis    Osteopenia     DEXA t-score -1.2 spine, -0.2 hip (4/08);  spine -1.2, hip 0.1 (10/10);  -1.8 spine, -0.3 FRAX 6.3/0.2 (7/13); [facility] -1.7 spine, 0.1 hip (4/17)    Overweight (BMI 25.0-29. 9)     IF 5/18 but unwilling to do; phentermine 1/19 start weight 145 lbs    Rotator cuff injury 2000s    conservative therapy LEFT    Sinus tachycardia 2018    Dr Bennett Enriquez    Spontaneous pneumothorax 1970s    x3    Zoster right S1 3/12     Current Outpatient Medications   Medication Sig    amoxicillin-clavulanate (AUGMENTIN) 875-125 mg per tablet Take 1 Tab by mouth two (2) times a day for 10 days.  nortriptyline (PAMELOR) 10 mg capsule Take 1 Cap by mouth nightly.  dexlansoprazole (Dexilant) 60 mg CpDB capsule (delayed release) Take 1 Cap by mouth daily.  diazePAM (VALIUM) 5 mg tablet TAKE 1 TAB BY MOUTH EVERY TWELVE (12) HOURS AS NEEDED FOR ANXIETY. MAX DAILY AMOUNT: 10 MG.  pravastatin (PRAVACHOL) 40 mg tablet TAKE 1 TAB BY MOUTH BY MOUTH NIGHTLY.  diclofenac (VOLTAREN) 1 % gel Apply 4 g to affected area four (4) times daily.  JANUVIA 100 mg tablet TAKE 1 TABLET BY MOUTH DAILY.  ergocalciferol (ERGOCALCIFEROL) 50,000 unit capsule Take 1 Cap by mouth every seven (7) days.  ADVAIR DISKUS 250-50 mcg/dose diskus inhaler TAKE 1 PUFF BY INHALATION TWO (2) TIMES A DAY. (Patient taking differently: TAKE 1 PUFF BY INHALATION as needed)    tamoxifen (NOLVADEX) 10 mg tablet Take  by mouth daily.  albuterol (VENTOLIN HFA) 90 mcg/actuation inhaler Take 2 Puffs by inhalation every six (6) hours as needed for Wheezing. Indications: BRONCHOSPASTIC PULMONARY DISEASE    b complex vitamins (B COMPLEX 1) tablet Take 1 Tab by mouth daily. No current facility-administered medications for this visit.       Allergies   Allergen Reactions    Actos [Pioglitazone] Other (comments)     Felt bad, weight gain    Avelox [Moxifloxacin] Other (comments)     Felt jittery    Crestor [Rosuvastatin] Myalgia    Evista [Raloxifene] Other (comments)     Severe flashes    Jardiance [Empagliflozin] Other (comments)     Recurring yeast infections    Lipitor [Atorvastatin] Other (comments)     Leg cramps and muscle aches    Macrobid [Nitrofurantoin Monohyd/M-Cryst] Hives    Metformin Other (comments)     Gi intolerance    Neuromuscular Blockers, Steroidal Other (comments)     \"patient feels crazy\"    Nsaids (Non-Steroidal Anti-Inflammatory Drug) Other (comments)     H/o gastropathy    Phentermine Other (comments)     Felt bad     Objective: There were no vitals taken for this visit. General: alert, cooperative, no distress   Mental  status: normal mood, behavior, speech, dress, motor activity, and thought processes, able to follow commands   HENT: NCAT   Neck: no visualized mass   Resp: no respiratory distress   Neuro: no gross deficits   Skin: no discoloration or lesions of concern on visible areas   Psychiatric: normal affect, consistent with stated mood, no evidence of hallucinations     Additional exam findings:   Asked pt to self exam.  Mild tenderness on manipulation of the external ear. No drainage noted. No mastoid tenderness. Preauricular LN and submandibular LN tenderness right side. Neck supple w from    Assessment and plan:  1. Presumed otitis. Will treat with augmentin, continue otc meds for sx relief  2. Post trauma headache. The pamelor works, she has not raised the dosing; tramadol being used sparingly; suggested inc the pamelor dosing to effect but as tolerated      Above conditions discussed at length and patient vocalized understanding. All questions answered to patient satisfaction      We discussed the expected course, resolution and complications of the diagnosis(es) in detail. Medication risks, benefits, costs, interactions, and alternatives were discussed as indicated.   I advised her to contact the office if her condition worsens, changes or fails to improve as anticipated. She expressed understanding with the diagnosis(es) and plan. Kesha Isabel is a 81 y.o. female being evaluated by a video visit encounter for concerns as above. A caregiver was present when appropriate. Due to this being a TeleHealth encounter (During [facility]-28 public health emergency), evaluation of the following organ systems was limited: Vitals/Constitutional/EENT/Resp/CV/GI//MS/Neuro/Skin/Heme-Lymph-Imm. Pursuant to the emergency declaration under the [place], [facility] waiver authority and the [employer] Act, this Virtual  Visit was conducted, with patient's (and/or legal guardian's) consent, to reduce the patient's risk of exposure to COVID-19 and provide necessary medical care. Services were provided through a video synchronous discussion virtually to substitute for in-person clinic visit. Patient and provider were located at their individual homes.         Darnell Garcia MD

## 2020-05-07 DIAGNOSIS — R51.9 NONINTRACTABLE HEADACHE, UNSPECIFIED CHRONICITY PATTERN, UNSPECIFIED HEADACHE TYPE: ICD-10-CM

## 2020-05-08 RX ORDER — NORTRIPTYLINE HYDROCHLORIDE 10 MG/1
10 CAPSULE ORAL
Qty: 90 CAP | Refills: 3 | Status: SHIPPED | OUTPATIENT
Start: 2020-05-08 | End: 2021-04-13

## 2020-05-11 DIAGNOSIS — F41.9 ANXIETY: ICD-10-CM

## 2020-05-12 DIAGNOSIS — F41.9 ANXIETY: ICD-10-CM

## 2020-05-12 RX ORDER — DIAZEPAM 5 MG/1
5 TABLET ORAL
Qty: 40 TAB | Refills: 1 | Status: CANCELLED | OUTPATIENT
Start: 2020-05-12

## 2020-05-12 RX ORDER — DIAZEPAM 5 MG/1
5 TABLET ORAL
Qty: 40 TAB | Refills: 1 | Status: SHIPPED | OUTPATIENT
Start: 2020-05-12 | End: 2020-07-24 | Stop reason: SDUPTHER

## 2020-05-12 NOTE — TELEPHONE ENCOUNTER
New prescription was sent to [facility] for Valium 5 mg #40 with 1 refill. Have patient contact pharmacy. No further action is required. Thank you. Requested Prescriptions     Pending Prescriptions Disp Refills    diazePAM (VALIUM) 5 mg tablet 40 Tab 1     Sig: Take 1 Tab by mouth every twelve (12) hours as needed for Anxiety. Max Daily Amount: 10 mg.

## 2020-07-24 DIAGNOSIS — F41.9 ANXIETY: ICD-10-CM

## 2020-07-24 NOTE — TELEPHONE ENCOUNTER
Last UDS 12/10/2019    I am no longer authorized to pull PMPs for Dr. Joleen Foster. Last Visit: 04/23/2020 with MD Joleen Foster  Next Appointment: 10/09/2020 with MD Joleen Foster  Previous Refill Encounter(s): 05/12/2020 per MD Joleen Foster #[id number] 1R    Requested Prescriptions     Pending Prescriptions Disp Refills    diazePAM (VALIUM) 5 mg tablet 40 Tab 1     Sig: Take 1 Tab by mouth every twelve (12) hours as needed for Anxiety. Max Daily Amount: 10 mg.

## 2020-07-24 NOTE — TELEPHONE ENCOUNTER
Requested Prescriptions     Pending Prescriptions Disp Refills    diazePAM (VALIUM) 5 mg tablet 40 Tab 1     Sig: Take 1 Tab by mouth every twelve (12) hours as needed for Anxiety. Max Daily Amount: 10 mg.         Pt apologizes, she is going out of town tomorrow for a week and just realized she only has 3 tabs left     She request please process today, if possible     [facility] [address]

## 2020-07-27 RX ORDER — DIAZEPAM 5 MG/1
5 TABLET ORAL
Qty: 40 TAB | Refills: 1 | Status: SHIPPED | OUTPATIENT
Start: 2020-07-27 | End: 2020-10-13 | Stop reason: SDUPTHER

## 2020-09-08 ENCOUNTER — TELEPHONE (OUTPATIENT)
Dept: INTERNAL MEDICINE CLINIC | Age: 70
End: 2020-09-08

## 2020-09-08 DIAGNOSIS — M19.90 OSTEOARTHRITIS, UNSPECIFIED OSTEOARTHRITIS TYPE, UNSPECIFIED SITE: Primary | ICD-10-CM

## 2020-09-08 NOTE — TELEPHONE ENCOUNTER
Pt said she was seen by podiatry , and was told melly has arthritis ,she is asking for a referral for Dr Miquel Reilly

## 2020-09-18 DIAGNOSIS — E55.9 VITAMIN D DEFICIENCY: ICD-10-CM

## 2020-09-20 RX ORDER — ERGOCALCIFEROL 1.25 MG/1
CAPSULE ORAL
Qty: 12 CAP | Refills: 4 | Status: SHIPPED | OUTPATIENT
Start: 2020-09-20 | End: 2020-10-13 | Stop reason: SDUPTHER

## 2020-09-30 LAB — MAMMOGRAPHY, EXTERNAL: NORMAL

## 2020-10-01 DIAGNOSIS — E61.1 IRON DEFICIENCY: ICD-10-CM

## 2020-10-02 ENCOUNTER — APPOINTMENT (OUTPATIENT)
Dept: INTERNAL MEDICINE CLINIC | Age: 70
End: 2020-10-02

## 2020-10-03 DIAGNOSIS — E55.9 VITAMIN D DEFICIENCY: ICD-10-CM

## 2020-10-03 LAB
25(OH)D3 SERPL-MCNC: 33.4 NG/ML (ref 32–100)
A-G RATIO,AGRAT: 2.6 RATIO (ref 1.1–2.6)
ALBUMIN SERPL-MCNC: 4.5 G/DL (ref 3.5–5)
ALP SERPL-CCNC: 49 U/L (ref 40–120)
ALT SERPL-CCNC: 21 U/L (ref 5–40)
ANION GAP SERPL CALC-SCNC: 21.1 MMOL/L (ref 3–15)
AST SERPL W P-5'-P-CCNC: 18 U/L (ref 10–37)
BILIRUB SERPL-MCNC: 0.3 MG/DL (ref 0.2–1.2)
BUN SERPL-MCNC: 14 MG/DL (ref 6–22)
CALCIUM SERPL-MCNC: 9.4 MG/DL (ref 8.4–10.5)
CHLORIDE SERPL-SCNC: 100 MMOL/L (ref 98–110)
CO2 SERPL-SCNC: 22 MMOL/L (ref 20–32)
CREAT SERPL-MCNC: 0.6 MG/DL (ref 0.8–1.4)
CREATININE, URINE: 195 MG/DL
ERYTHROCYTE [DISTWIDTH] IN BLOOD BY AUTOMATED COUNT: 13.6 % (ref 10–15.5)
FE % SATURATION,PSAT: 41 % (ref 20–50)
GFRAA, 66117: >60
GFRNA, 66118: >60
GLOBULIN,GLOB: 1.7 G/DL (ref 2–4)
GLUCOSE SERPL-MCNC: 85 MG/DL (ref 70–99)
HCT VFR BLD AUTO: 44.3 % (ref 35.1–48.3)
HGB BLD-MCNC: 13.3 G/DL (ref 11.7–16.1)
IRON,IRN: 107 MCG/DL (ref 30–160)
MCH RBC QN AUTO: 30 PG (ref 26–34)
MCHC RBC AUTO-ENTMCNC: 30 G/DL (ref 31–36)
MCV RBC AUTO: 100 FL (ref 81–99)
MICROALB/CREAT RATIO, 140286: 6.2 (ref 0–30)
MICROALBUMIN,URINE RANDOM 140054: 12 MG/L (ref 0.1–17)
PLATELET # BLD AUTO: 248 K/UL (ref 140–440)
PMV BLD AUTO: 10.2 FL (ref 9–13)
POTASSIUM SERPL-SCNC: 4.4 MMOL/L (ref 3.5–5.5)
PROT SERPL-MCNC: 6.2 G/DL (ref 6.2–8.1)
RBC # BLD AUTO: 4.43 M/UL (ref 3.8–5.2)
SODIUM SERPL-SCNC: 143 MMOL/L (ref 133–145)
TIBC,TIBC: 261 MCG/DL (ref 228–428)
UIBC SERPL-MCNC: 154 MCG/DL (ref 110–370)
WBC # BLD AUTO: 6.1 K/UL (ref 4–11)

## 2020-10-04 LAB
AVG GLU, 10930: 155 MG/DL (ref 91–123)
HBA1C MFR BLD HPLC: 7 % (ref 4.8–5.6)

## 2020-10-08 NOTE — PROGRESS NOTES
78 y.o. WHITE OR  female who presents for evaluation. She continues to tamox for RIGHT breast ca being followed by Dr Raiza Liang 1-2x/year    Continues to do at least 8k steps daily on her counter, no cardiovascular complaints     Denies polyuria, polydipsia, nocturia, vision change. She's on [place] and [place] and no hypoglycemia episodes. Remains on PPI and iron supplementation, no GI complaints to report    Continues to see Dr Magdalene Bishop and only using the advair prn      LAST MEDICARE WELLNESS EXAM: never as not medicare b    Past Medical History:   Diagnosis Date    ADD (attention deficit disorder) 2014    [facility] psych    Amebic colitis 1970s    Arthritis     s/p cortisone LEFT knee    Breast cancer ([facility] [address] 02/2016    Dr Raiza Liang; RIGHT partial mastectomy, adjuvant XRT    Bronchiectasis     Dr. Magdalene Bishop; RLL    Concussion     Diabetes mellitus ([facility] [address] 09/2013    on basis of elev a1c    Fibrocystic breast disease     Dr. Princess Genao GERD (gastroesophageal reflux disease)     Dr. Corey Smith s/p dilation 2/12    H/O pulmonary function tests 2011    ratio 70, FEV1 81 no change postbd, TLC 93, RV 94, DLCO 81    Hyperlipidemia     Hypovitaminosis D     Insomnia     Iron deficiency anemia 05/2018    eval Dr Corey Smith showed gastritis    Osteopenia     DEXA t-score -1.2 spine, -0.2 hip (4/08);  spine -1.2, hip 0.1 (10/10);  -1.8 spine, -0.3 FRAX 6.3/0.2 (7/13); [facility] -1.7 spine, 0.1 hip (4/17)    Overweight (BMI 25.0-29. 9)     IF 5/18 but unwilling to do; phentermine 1/19 start weight 145 lbs    Rotator cuff injury 2000s    conservative therapy LEFT    Sinus tachycardia 2018    Dr Regina Ramirez    Spontaneous pneumothorax 1970s    x3    Zoster right S1 3/12     Past Surgical History:   Procedure Laterality Date    BREAST SURGERY PROCEDURE UNLISTED  02/2011    LEFT nipple duct exploration and excisional biopsy    CARDIAC SURG PROCEDURE UNLIST  4/14    NST negative, ef 75%    CARDIAC SURG PROCEDURE UNLIST 10/2018    Dr Gaby Rodriguez; 30d monitor neg    HX BREAST BIOPSY      RIGHT breast biopsy (); RIGHT breast lumpectomy ()    HX COLONOSCOPY      Dr. Ellie Bryant negative ; diverticulosis  Dr Gerhardt Levers; Dr Ellie Bryant 17 neg    HX ENDOSCOPY  2018    Dr Ellie Bryant; gastropathy, h pylori neg    HX HYSTERECTOMY      fibroids/ovarian cysts - Dr. 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     Employer: [employer]   Social Needs    Financial resource strain: Not on file    Food insecurity     Worry: Not on file     Inability: Not on file   [employer] needs     Medical: Not on file     Non-medical: Not on file   Tobacco Use    Smoking status: Former Smoker     Packs/day: 1.00     Years: 5.00     Pack years: 5.00     Last attempt to quit: 1973     Years since quittin.3    Smokeless tobacco: Never Used   Substance and Sexual Activity    Alcohol use: No    Drug use: No    Sexual activity: Not on file   Lifestyle    Physical activity     Days per week: Not on file     Minutes per session: Not on file    Stress: Not on file   Relationships    Social connections     Talks on phone: Not on file     Gets together: Not on file     Attends Restorationism service: Not on file     Active member of club or organization: Not on file     Attends meetings of clubs or organizations: Not on file     Relationship status: Not on file    Intimate partner violence     Fear of current or ex partner: Not on file     Emotionally abused: Not on file     Physically abused: Not on file     Forced sexual activity: Not on file   Other Topics Concern    Not on file   Social History Narrative    Not on file     Current Outpatient Medications   Medication Sig    ergocalciferol (ERGOCALCIFEROL) 1,250 mcg (50,000 unit) capsule TAKE ONE CAPSULE BY MOUTH ONE TIME PER WEEK    diazePAM (VALIUM) 5 mg tablet Take 1 Tab by mouth every twelve (12) hours as needed for Anxiety. Max Daily Amount: 10 mg.    nortriptyline (PAMELOR) 10 mg capsule Take 1 Cap by mouth nightly.  dexlansoprazole (Dexilant) 60 mg CpDB capsule (delayed release) Take 1 Cap by mouth daily.  pravastatin (PRAVACHOL) 40 mg tablet TAKE 1 TAB BY MOUTH BY MOUTH NIGHTLY.  diclofenac (VOLTAREN) 1 % gel Apply 4 g to affected area four (4) times daily.  JANUVIA 100 mg tablet TAKE 1 TABLET BY MOUTH DAILY.  ADVAIR DISKUS 250-50 mcg/dose diskus inhaler TAKE 1 PUFF BY INHALATION TWO (2) TIMES A DAY. (Patient taking differently: TAKE 1 PUFF BY INHALATION as needed)    tamoxifen (NOLVADEX) 10 mg tablet Take  by mouth daily.  albuterol (VENTOLIN HFA) 90 mcg/actuation inhaler Take 2 Puffs by inhalation every six (6) hours as needed for Wheezing. Indications: BRONCHOSPASTIC PULMONARY DISEASE    b complex vitamins (B COMPLEX 1) tablet Take 1 Tab by mouth daily. No current facility-administered medications for this visit.       Allergies   Allergen Reactions    Actos [Pioglitazone] Other (comments)     Felt bad, weight gain    Avelox [Moxifloxacin] Other (comments)     Felt jittery    Crestor [Rosuvastatin] Myalgia    Evista [Raloxifene] Other (comments)     Severe flashes    Jardiance [Empagliflozin] Other (comments)     Recurring yeast infections    Lipitor [Atorvastatin] Other (comments)     Leg cramps and muscle aches    Macrobid [Nitrofurantoin Monohyd/M-Cryst] Hives    Metformin Other (comments)     Gi intolerance    Neuromuscular Blockers, Steroidal Other (comments)     \"patient feels crazy\"    Nsaids (Non-Steroidal Anti-Inflammatory Drug) Other (comments)     H/o gastropathy    Phentermine Other (comments) Felt bad     REVIEW OF SYSTEMS: gyn 2012, mammo 2018, DEXA 4/17, colo 12/13 Dr Yonny Frank, Dr Lopez Nicely  no vision change or eye pain  Oral  no mouth pain, tongue or tooth problems  Ears  no hearing loss, ear pain, fullness, no swallowing problems  Cardiac  no CP, PND, orthopnea, edema, palpitations or syncope  Chest  no breast masses  Resp  no wheezing, chronic coughing, dyspnea  GI  no heartburn, nausea, vomiting, change in bowel habits, bleeding, hemorrhoids  Urinary  no dysuria, hematuria, flank pain, urgency, frequency    Visit Vitals  /61   Pulse (!) 102   Temp 97.9 °F (36.6 °C) (Temporal)   Resp 14   Ht 5' 3\" (1.6 m)   Wt 141 lb (64 kg)   SpO2 98%   BMI 24.98 kg/m²   A&O x3  Affect is appropriate. Mood stable  No apparent distress  Anicteric, no JVD, adenopathy or thyromegaly. No carotid bruits or radiated murmur  Lungs clear to auscultation, no wheezes or rales  Heart showed regular rate and rhythm. No murmur, rubs, gallops  Abdomen soft nontender, no hepatosplenomegaly or masses. Extremities without edema.   Pulses 1-2+ symmetrically    LABS  From 9/10 showed   gluc 106, cr 0.70, gfr>60, alt 42,                   chol 238, tg 154, hdl 54, ldl-c 153, wbc 6.5, hb 13.1, plt 258, ua neg  From 2/11 showed   gluc 95,   cr 0.90                                                                                                   wbc 7.0, hb 14.1, plt 308  From 3/12 showed   gluc 103, cr 0.90              alt 43,                                                                           wbc 6.2, hb 13.7, plt 285  From 3/13 showed   gluc 130, cr 0.60,             alt 22,                    chol 244, tg 180, hdl 57, ldl-c 151,                                          ua neg,     tsh 1.79  From 9/13 showed   gluc 119, cr 0.70, gfr>60,            hba1c 6.6, chol 228, tg 145, hdl 62, ldl-c 137, 2hr   From 2/14 showed   gluc 95,   cr 0.60, gfr>61, alt 20, hba1c 5.7,                    umar 2.8  From 4/14 showed   gluc 144, cr 0.98, gfr>60, alt 31,          wbc 7.6, hb 13.4, plt 317,                  ck/trop neg  From 10/14 showed gluc 98,   cr 0.60, gfr>60, alt 20,          chol 163, tg 96,   hdl 64, ldl-c 80  From 4/15 showed   gluc 96,   cr 0.70, gfr>60, alt 19, hba1c 6.1, chol 207, tg 126, hdl 68, ldl-c 114  From 10/15 showed        hba1c 6.3, chol 188, tg 118, hdl 66, ldl-c 98,   wbc 5.3, hb 13.3, plt 287, umar neg  From 4/16 showed   gluc 98,   cr 0.60, gfr>60, alt 19, hba1c 6.3, chol 170, tg 141, hdl 57, ldl-c 85  From 10/16 showed gluc 137, cr 0.50, gfr>60, alt 17, hba1c 6.2, chol 176, tg 302, hdl 54, ldl-c 62,   wbc 8.9, hb 12.3, plt 393, umar neg  From 1/17 showed   gluc 114, cr 0.60, gfr>60,           wbc 6.1, hb 12.9, plt 356  From 4/17 showed        hba1c 6.8, chol 223, tg 240, hdl 53, ldl-c 122  From 9/17 showed   gluc 109, cr 0.70, gfr>60, alt 16, hba1c 6.7,                  umar neg  From 1/18 showed   gluc 104, cr 0.60, gfr>60, alt 14, hba1c 6.8, chol 154, tg 197, hdl 60, ldl-c 55  From 5/18 showed        hba1c 7.0,          wbc 5.7, hb 11.3, plt 281, umar neg, fe 43, %sat 11, ferritin 7,   b12 588, fol 18.6, spep neg, retic 1.6  From 9/18 showed   gluc 143, cr 0.70, gfr>60,    hba1c 7.0, chol 155, tg 199, hdl 56, ldl-c 59  From 1/19 showed                               fe 80, %sat 28, ferritin 28, vit d 37.1  From 5/19 showed   gluc 155, cr 0.70, gfr>60, alt 22, hba1c 7.4, chol 139, tg 172, hdl 49, ldl-c 55,   wbc 6.0, hb 13.3, plt 214, umar neg  From 10/19 showed        hba1c 6.8,                  umar 9.8,  fe 79, %sat 29,           vit d 48.7  From 4/20 showed        hba1c 7.0, chol 142, tg 115, hdl 50, ldl-c 69                  vit d 42.8  From 10/20 showed gluc 85,   cr 0.60, gfr>60, alt 21, hba1c 7.0,          wbc 6.1, hb 13.3, plt 248,              vit d 33.4    Patient Active Problem List   Diagnosis Code    Bronchiectasis Dr. Danish Baum J47.9    Osteopenia  M85.80    Hyperlipidemia E78.5    Gastroesophageal reflux disease without esophagitis K21.9    Uncontrolled type 2 diabetes mellitus [id number]    History of breast cancer Z85.3    Vitamin D deficiency E55.9    Overweight (BMI 25.0-29. 9) E66.3     Assessment and plan:  1. Pulm. F/U Dr Nava Sinclair and continue current  2. GERD. Continue ppi and avoidance measures  3. Osteopenia. Ca/d, wt bearing exercises  4. DM. Continue current regimen; we discussed possibly adding prandin but decided to hold off for now  5. Lipids. Continue current regimen, vascepa once covered  6  Breast ca. Per Dr Garth An  7. Iron def anemia. Resolved  8. Overweight. Lifestyle and dietary measures. Portion control reiterated. RTC 4/21    Above conditions discussed at length and patient vocalized understanding.   All questions answered to patient satisfaction

## 2020-10-13 ENCOUNTER — OFFICE VISIT (OUTPATIENT)
Dept: INTERNAL MEDICINE CLINIC | Age: 70
End: 2020-10-13
Payer: COMMERCIAL

## 2020-10-13 VITALS
DIASTOLIC BLOOD PRESSURE: 61 MMHG | HEIGHT: 63 IN | HEART RATE: 102 BPM | TEMPERATURE: 97.9 F | WEIGHT: 141 LBS | RESPIRATION RATE: 14 BRPM | BODY MASS INDEX: 24.98 KG/M2 | SYSTOLIC BLOOD PRESSURE: 129 MMHG | OXYGEN SATURATION: 98 %

## 2020-10-13 DIAGNOSIS — J47.9 BRONCHIECTASIS WITHOUT COMPLICATION (HCC): ICD-10-CM

## 2020-10-13 DIAGNOSIS — F41.9 ANXIETY: ICD-10-CM

## 2020-10-13 DIAGNOSIS — E78.5 HYPERLIPIDEMIA, UNSPECIFIED HYPERLIPIDEMIA TYPE: ICD-10-CM

## 2020-10-13 DIAGNOSIS — Z85.3 HISTORY OF BREAST CANCER: ICD-10-CM

## 2020-10-13 DIAGNOSIS — D50.9 IRON DEFICIENCY ANEMIA, UNSPECIFIED IRON DEFICIENCY ANEMIA TYPE: ICD-10-CM

## 2020-10-13 DIAGNOSIS — Z23 NEEDS FLU SHOT: ICD-10-CM

## 2020-10-13 DIAGNOSIS — E55.9 VITAMIN D DEFICIENCY: ICD-10-CM

## 2020-10-13 DIAGNOSIS — E11.9 CONTROLLED TYPE 2 DIABETES MELLITUS WITHOUT COMPLICATION, WITHOUT LONG-TERM CURRENT USE OF INSULIN (HCC): Primary | ICD-10-CM

## 2020-10-13 PROCEDURE — G8427 DOCREV CUR MEDS BY ELIG CLIN: HCPCS | Performed by: INTERNAL MEDICINE

## 2020-10-13 PROCEDURE — 3017F COLORECTAL CA SCREEN DOC REV: CPT | Performed by: INTERNAL MEDICINE

## 2020-10-13 PROCEDURE — G8420 CALC BMI NORM PARAMETERS: HCPCS | Performed by: INTERNAL MEDICINE

## 2020-10-13 PROCEDURE — 2022F DILAT RTA XM EVC RTNOPTHY: CPT | Performed by: INTERNAL MEDICINE

## 2020-10-13 PROCEDURE — 3051F HG A1C>EQUAL 7.0%<8.0%: CPT | Performed by: INTERNAL MEDICINE

## 2020-10-13 PROCEDURE — G8399 PT W/DXA RESULTS DOCUMENT: HCPCS | Performed by: INTERNAL MEDICINE

## 2020-10-13 PROCEDURE — 1101F PT FALLS ASSESS-DOCD LE1/YR: CPT | Performed by: INTERNAL MEDICINE

## 2020-10-13 PROCEDURE — 90694 VACC AIIV4 NO PRSRV 0.5ML IM: CPT | Performed by: INTERNAL MEDICINE

## 2020-10-13 PROCEDURE — 1090F PRES/ABSN URINE INCON ASSESS: CPT | Performed by: INTERNAL MEDICINE

## 2020-10-13 PROCEDURE — G9899 SCRN MAM PERF RSLTS DOC: HCPCS | Performed by: INTERNAL MEDICINE

## 2020-10-13 PROCEDURE — G8536 NO DOC ELDER MAL SCRN: HCPCS | Performed by: INTERNAL MEDICINE

## 2020-10-13 PROCEDURE — G8510 SCR DEP NEG, NO PLAN REQD: HCPCS | Performed by: INTERNAL MEDICINE

## 2020-10-13 PROCEDURE — 99214 OFFICE O/P EST MOD 30 MIN: CPT | Performed by: INTERNAL MEDICINE

## 2020-10-13 PROCEDURE — 90471 IMMUNIZATION ADMIN: CPT | Performed by: INTERNAL MEDICINE

## 2020-10-13 RX ORDER — LANOLIN ALCOHOL/MO/W.PET/CERES
CREAM (GRAM) TOPICAL
COMMUNITY

## 2020-10-13 RX ORDER — DIAZEPAM 5 MG/1
5 TABLET ORAL
Qty: 40 TAB | Refills: 1 | Status: SHIPPED | OUTPATIENT
Start: 2020-10-13 | End: 2020-12-10

## 2020-10-13 RX ORDER — ERGOCALCIFEROL 1.25 MG/1
CAPSULE ORAL
Qty: 12 CAP | Refills: 4 | Status: SHIPPED | OUTPATIENT
Start: 2020-10-13 | End: 2020-12-10

## 2020-10-13 NOTE — PROGRESS NOTES
Gustavo Hunt 1950 female who presents for routine immunizations. Patient denies any symptoms , reactions or allergies that would exclude them from being immunized today. Risks and adverse reactions were discussed and the VIS was given to them. All questions were addressed. Order placed for influenza,  per Verbal Order from [address] with read back. Patient was observed for 15 min post injection. There were no reactions observed.     Reanna Wong LPN

## 2020-10-13 NOTE — PATIENT INSTRUCTIONS
Vaccine Information Statement Influenza (Flu) Vaccine (Inactivated or Recombinant): What You Need to Know Many Vaccine Information Statements are available in Occitan and other languages. See www.immunize.org/vis Hojas de información sobre vacunas están disponibles en español y en muchos otros idiomas. Visite www.immunize.org/vis 1. Why get vaccinated? Influenza vaccine can prevent influenza (flu). Flu is a contagious disease that spreads around the [facility] every year, usually between October and May. Anyone can get the flu, but it is more dangerous for some people. Infants and young children, people 72years of age and older, pregnant women, and people with certain health conditions or a weakened immune system are at greatest risk of flu complications. Pneumonia, bronchitis, sinus infections and ear infections are examples of flu-related complications. If you have a medical condition, such as heart disease, cancer or diabetes, flu can make it worse. Flu can cause fever and chills, sore throat, muscle aches, fatigue, cough, headache, and runny or stuffy nose. Some people may have vomiting and diarrhea, though this is more common in children than adults. Each year thousands of people in the [facility] die from flu, and many more are hospitalized. Flu vaccine prevents millions of illnesses and flu-related visits to the doctor each year. 2. Influenza vaccines CDC recommends everyone 10months of age and older get vaccinated every flu season. Children 6 months through 6years of age may need 2 doses during a single flu season. Everyone else needs only 1 dose each flu season. It takes about 2 weeks for protection to develop after vaccination. There are many flu viruses, and they are always changing. Each year a new flu vaccine is made to protect against three or four viruses that are likely to cause disease in the upcoming flu season.  Even when the vaccine doesnt exactly match these viruses, it may still provide some protection. Influenza vaccine does not cause flu. Influenza vaccine may be given at the same time as other vaccines. 3. Talk with your health care provider Tell your vaccine provider if the person getting the vaccine: 
 Has had an allergic reaction after a previous dose of influenza vaccine, or has any severe, life-threatening allergies.  Has ever had Guillain-Barré Syndrome (also called GBS). In some cases, your health care provider may decide to postpone influenza vaccination to a future visit. People with minor illnesses, such as a cold, may be vaccinated. People who are moderately or severely ill should usually wait until they recover before getting influenza vaccine. Your health care provider can give you more information. 4. Risks of a reaction  Soreness, redness, and swelling where shot is given, fever, muscle aches, and headache can happen after influenza vaccine.  There may be a very small increased risk of Guillain-Barré Syndrome (GBS) after inactivated influenza vaccine (the flu shot). Verlin Tyler children who get the flu shot along with pneumococcal vaccine (PCV13), and/or DTaP vaccine at the same time might be slightly more likely to have a seizure caused by fever. Tell your health care provider if a child who is getting flu vaccine has ever had a seizure. People sometimes faint after medical procedures, including vaccination. Tell your provider if you feel dizzy or have vision changes or ringing in the ears. As with any medicine, there is a very remote chance of a vaccine causing a severe allergic reaction, other serious injury, or death. 5. What if there is a serious problem? An allergic reaction could occur after the vaccinated person leaves the clinic.  If you see signs of a severe allergic reaction (hives, swelling of the face and throat, difficulty breathing, a fast heartbeat, dizziness, or weakness), call 9-1-1 and get the person to the nearest hospital. 
 
For other signs that concern you, call your health care provider. Adverse reactions should be reported to the Vaccine Adverse Event Reporting System (VAERS). Your health care provider will usually file this report, or you can do it yourself. Visit the VAERS website at www.vaers. hhs.gov or call 1-680.870.1533. VAERS is only for reporting reactions, and VAERS staff do not give medical advice. 6. The National Vaccine Injury Compensation Program 
 
The [facility] Vaccine Injury Compensation Program (VICP) is a federal program that was created to compensate people who may have been injured by certain vaccines. Visit the VICP website at www.hrsa.gov/vaccinecompensation or call 9-420.657.4810 to learn about the program and about filing a claim. There is a time limit to file a claim for compensation. 7. How can I learn more?  Ask your health care provider.  Call your local or state health department.  Contact the Centers for Disease Control and Prevention (CDC): 
- Call 1-291.944.7717 (5-654-UPR-INFO) or 
- Visit CDCs influenza website at www.cdc.gov/flu Vaccine Information Statement (Interim) Inactivated Influenza Vaccine 8/15/2019 
42 SALOMON Forde 252GZ-12 Department of [facility] and [employer] Centers for Disease Control and Prevention Office Use Only

## 2020-10-15 RX ORDER — SITAGLIPTIN 100 MG/1
TABLET, FILM COATED ORAL
Qty: 90 TAB | Refills: 3 | Status: SHIPPED | OUTPATIENT
Start: 2020-10-15 | End: 2021-07-26 | Stop reason: ALTCHOICE

## 2020-12-09 DIAGNOSIS — E55.9 VITAMIN D DEFICIENCY: ICD-10-CM

## 2020-12-09 DIAGNOSIS — F41.9 ANXIETY: ICD-10-CM

## 2020-12-09 DIAGNOSIS — E78.5 HYPERLIPIDEMIA, UNSPECIFIED HYPERLIPIDEMIA TYPE: ICD-10-CM

## 2020-12-10 RX ORDER — ERGOCALCIFEROL 1.25 MG/1
CAPSULE ORAL
Qty: 12 CAP | Refills: 4 | Status: SHIPPED | OUTPATIENT
Start: 2020-12-10 | End: 2022-01-14

## 2020-12-10 RX ORDER — DIAZEPAM 5 MG/1
TABLET ORAL
Qty: 40 TAB | Refills: 1 | Status: SHIPPED | OUTPATIENT
Start: 2020-12-10 | End: 2021-02-18 | Stop reason: SDUPTHER

## 2020-12-10 RX ORDER — PRAVASTATIN SODIUM 40 MG/1
TABLET ORAL
Qty: 90 TAB | Refills: 3 | Status: SHIPPED | OUTPATIENT
Start: 2020-12-10 | End: 2021-11-10

## 2020-12-17 NOTE — TELEPHONE ENCOUNTER
Patient still has refills at the pharmacy for  [facility]. I contacted the pharmacy to confirm. I contacted patient, name and  were verified. Patient was advised of this information. She was told to contact the pharmacy for a refill. Patient verbalized understanding.

## 2020-12-23 ENCOUNTER — TELEPHONE (OUTPATIENT)
Dept: INTERNAL MEDICINE CLINIC | Age: 70
End: 2020-12-23

## 2020-12-24 NOTE — TELEPHONE ENCOUNTER
Continue to treat sx  Get pulse ox machine and go to ER if persistently below 91%  Otherwise, just waiting for sx to clear

## 2020-12-24 NOTE — TELEPHONE ENCOUNTER
Details pls  When dx'ed? Where? Initial sx? What are current sx? Anything new?     Yes, you can have fevers intermittently throughout the process

## 2020-12-24 NOTE — TELEPHONE ENCOUNTER
Pt stated her symptoms started on the 14th. She started with bad body aches,fever, nasal passages feeling stuffy, headache, chest heaviness ( these lasted about 4 days). Pt was given a Zpack and completed this on yesterday. Tested positive on the 18th. Monday night fever started again, minor body aches, heaviness in chest (feels about the same as it did when symptoms developed) and the only new symptom she is having is a productive cough.

## 2020-12-24 NOTE — TELEPHONE ENCOUNTER
Patient is aware continue to treat symptoms and monitor pulse ox if persistently below 91%, she will need to go to the ED. Patient verbalizes understanding.

## 2021-02-18 DIAGNOSIS — F41.9 ANXIETY: ICD-10-CM

## 2021-02-18 NOTE — TELEPHONE ENCOUNTER
No access to      Last visit 10/13/2020 MD Katelyn Gustafson   Next appointment 04/13/2021 MD Katelyn Gustafson   Previous refill encounter(s)   12/10/2020 Valium #40     Requested Prescriptions     Pending Prescriptions Disp Refills    diazePAM (VALIUM) 5 mg tablet 40 Tab 1     Sig: Take 1 Tab by mouth every twelve (12) hours as needed for Anxiety. Max Daily Amount: 10 mg.

## 2021-02-19 RX ORDER — DIAZEPAM 5 MG/1
5 TABLET ORAL
Qty: 40 TAB | Refills: 1 | Status: SHIPPED | OUTPATIENT
Start: 2021-02-19 | End: 2021-04-26 | Stop reason: SDUPTHER

## 2021-02-25 ENCOUNTER — OFFICE VISIT (OUTPATIENT)
Dept: PULMONOLOGY | Age: 71
End: 2021-02-25
Payer: COMMERCIAL

## 2021-02-25 VITALS
SYSTOLIC BLOOD PRESSURE: 146 MMHG | DIASTOLIC BLOOD PRESSURE: 74 MMHG | OXYGEN SATURATION: 97 % | BODY MASS INDEX: 25.8 KG/M2 | TEMPERATURE: 97.5 F | HEIGHT: 63 IN | WEIGHT: 145.6 LBS | RESPIRATION RATE: 16 BRPM | HEART RATE: 105 BPM

## 2021-02-25 DIAGNOSIS — K21.9 GASTROESOPHAGEAL REFLUX DISEASE WITHOUT ESOPHAGITIS: ICD-10-CM

## 2021-02-25 DIAGNOSIS — J47.9 BRONCHIECTASIS WITHOUT COMPLICATION (HCC): Primary | ICD-10-CM

## 2021-02-25 DIAGNOSIS — U07.1 COVID-19: ICD-10-CM

## 2021-02-25 PROCEDURE — 99214 OFFICE O/P EST MOD 30 MIN: CPT | Performed by: INTERNAL MEDICINE

## 2021-02-25 RX ORDER — ALBUTEROL SULFATE 90 UG/1
2 AEROSOL, METERED RESPIRATORY (INHALATION)
Qty: 1 INHALER | Refills: 3 | Status: SHIPPED | OUTPATIENT
Start: 2021-02-25

## 2021-02-25 NOTE — PROGRESS NOTES
[facility]  Pulmonary, Critical Care, and Sleep Medicine      Pulmonary Office Progress Notes    Name: Li Huntley     : 1950     Date: 2021        Subjective:     2021  Ms. Antonietta Nichols is here for follow-up-chronic bronchiectasis. Patient states that she has done well overall except for a brief time off 2 weeks in December when she contacted Covid on  and had symptoms of increased cough with some shortness of breath for 2 weeks. She was seen at an urgent care and was given Zithromax and advised to use her inhalers. She states that her sats remained more than 91% at all times and she did not need to go to the ER or be hospitalized. She was not prescribed any steroids. She subsequently has recovered and is feeling back to her usual self. She is back to work. Apparently her grandson was tested positive and her family rode with him in the same vehicle. Currently she denies any increase in cough shortness of breath wheezing or chest tightness  She has not had any productive expectoration  Denies any residual symptoms of fatigue        HPI    Li Huntley  is a 78 y.o. female with PMH of bronchiectasis and GERD who presents for evaluation of left-sided chest pain, short shortness of breath and cough. She was last seen here on   with complaints of similar symptoms and was treated with Augmentin and bronchial hygiene measures. Today she appears comfortable, in no distress but reports feelings of extreme fatigue and left-sided pleuritic pain that is worse with deep inspiration x 6 days. She reports an intermittent cough productive of yellow/green sputum and increased shortness of breath with exertion. She also states that she has had a fever for 4 days and has been taking Motrin/Tylenol daily.   She was evaluated by her PCP and prescribed a course of azithromycin however she reports that she has had no improvement in symptoms after 3 days of treatment. She  has no nasal congestion/drainage or sinus pressure / pain. She denies hemoptysis or orthopnea; no leg/calf pain or swelling and no decreased appetite or weight loss. She take advair seasonally and albuterol very infrequently due to intolerance of side effects (jitters, shaking). She is allergic to prednisone (steroid induced psychosis). Past Medical History:   Diagnosis Date    ADD (attention deficit disorder) 2014    [facility] psych    Amebic colitis 1970s    Arthritis     s/p cortisone LEFT knee    Breast cancer ([address] 02/2016    Dr Malu Hurtado; RIGHT partial mastectomy, adjuvant XRT    Bronchiectasis     Dr. Andrew Stone; RLL    Concussion     COVID-19 virus infection 12/2020    Diabetes mellitus ([address] 09/2013    on basis of elev a1c    Fibrocystic breast disease     Dr. Betty Morales GERD (gastroesophageal reflux disease)     Dr. Sung Martino s/p dilation 2/12    H/O pulmonary function tests 2011    ratio 70, FEV1 81 no change postbd, TLC 93, RV 94, DLCO 81    Hyperlipidemia     Hypovitaminosis D     Insomnia     Iron deficiency anemia 05/2018    eval Dr Sung Martino showed gastritis    Osteopenia     DEXA t-score -1.2 spine, -0.2 hip (4/08);  spine -1.2, hip 0.1 (10/10);  -1.8 spine, -0.3 FRAX 6.3/0.2 (7/13); [facility] -1.7 spine, 0.1 hip (4/17)    Overweight (BMI 25.0-29. 9)     IF 5/18 but unwilling to do; phentermine 1/19 start weight 145 lbs    Rotator cuff injury 2000s    conservative therapy LEFT    Sinus tachycardia 2018    Dr Twin Caceres    Spontaneous pneumothorax 1970s    x3    Zoster right S1 3/12       Allergies   Allergen Reactions    Actos [Pioglitazone] Other (comments)     Felt bad, weight gain    Avelox [Moxifloxacin] Other (comments)     Felt jittery    Crestor [Rosuvastatin] Myalgia    Evista [Raloxifene] Other (comments)     Severe flashes    Jardiance [Empagliflozin] Other (comments)     Recurring yeast infections    Lipitor [Atorvastatin] Other (comments)     Leg cramps and muscle aches    Macrobid [Nitrofurantoin Monohyd/M-Cryst] Hives    Metformin Other (comments)     Gi intolerance    Neuromuscular Blockers, Steroidal Other (comments)     \"patient feels crazy\"    Nsaids (Non-Steroidal Anti-Inflammatory Drug) Other (comments)     H/o gastropathy    Phentermine Other (comments)     Felt bad       Current Outpatient Medications   Medication Sig Dispense Refill    diazePAM (VALIUM) 5 mg tablet Take 1 Tab by mouth every twelve (12) hours as needed for Anxiety. Max Daily Amount: 10 mg. 40 Tab 1    ergocalciferol (ERGOCALCIFEROL) 1,250 mcg (50,000 unit) capsule TAKE 1 CAPSULE BY MOUTH 1 TIME PER WEEK 12 Cap 4    pravastatin (PRAVACHOL) 40 mg tablet TAKE 1 TABLET BY MOUTH NIGHTLY. 90 Tab 3    Januvia 100 mg tablet TAKE 1 TABLET BY MOUTH DAILY. 90 Tab 3    zinc sulfate (ZINC-15 PO) Take  by mouth.  calcium carbonate (CALCIUM 300 PO) Take  by mouth.  dexlansoprazole (Dexilant) 60 mg CpDB capsule (delayed release) Take 1 Cap by mouth daily. 90 Cap 3    tamoxifen (NOLVADEX) 10 mg tablet Take  by mouth daily.  b complex vitamins (B COMPLEX 1) tablet Take 1 Tab by mouth daily.  ferrous sulfate (Iron) 325 mg (65 mg iron) tablet Take  by mouth Daily (before breakfast).  nortriptyline (PAMELOR) 10 mg capsule Take 1 Cap by mouth nightly. 90 Cap 3    diclofenac (VOLTAREN) 1 % gel Apply 4 g to affected area four (4) times daily. 500 g 2    ADVAIR DISKUS 250-50 mcg/dose diskus inhaler TAKE 1 PUFF BY INHALATION TWO (2) TIMES A DAY. (Patient taking differently: TAKE 1 PUFF BY INHALATION as needed) 1 Inhaler 5    albuterol (VENTOLIN HFA) 90 mcg/actuation inhaler Take 2 Puffs by inhalation every six (6) hours as needed for Wheezing.  Indications: BRONCHOSPASTIC PULMONARY DISEASE 1 Inhaler 3       Review of Systems:    HEENT: No epistaxis, no nasal drainage, no difficulty in swallowing, no redness in eyes  Respiratory: As stated above in HPI  Cardiovascular: Left sided pleuritic chest pain, worse with deep inspiration. No palpitations, no chronic leg edema, no syncope  Gastrointestinal: no abd pain, no vomiting, no diarrhea, no bleeding symptoms  Genitourinary: No urinary symptoms or hematuria  Integument/breast: No ulcers or rashes  Musculoskeletal: No leg / calf pain  Neurological: No focal weakness, no seizures, no headaches  Behvioral/Psych: No anxiety, no depression  Constitutional: Fever, chills and fatigue x 4 days. No decreased appetite or weight loss     Objective:     Visit Vitals  BP (!) 146/74 (BP 1 Location: Right upper arm, BP Patient Position: Sitting, BP Cuff Size: Large adult)   Pulse (!) 105   Temp 97.5 °F (36.4 °C) (Oral)   Resp 16   Ht 5' 3\" (1.6 m)   Wt 66 kg (145 lb 9.6 oz)   SpO2 97% Comment: RA Rest   BMI 25.79 kg/m²        PHYSICAL EXAM      General: Oriented to person, place, and time. Well-developed, well-nourished, and in no distress      Head:   Normocephalic, without obvious abnormality, atraumatic       Eyes:   Pupils reactive, conjunctivae / corneas clear. EOM's intact, no scleral icterus         Neck:   Supple, symmetrical, trachea midline. No adenopathy or thyroid swelling; no carotid bruit or JVD. CVS:    Regular rate and rhythm. S1S2 normal,  no murmurs       RS:      Symmetrical chest rise, moderate AE bilaterally. Lung sounds clear to auscultation bilaterally. No wheezing, rales or rhonchi, no accessory muscle use      Abd:     Soft, non-tender. No hepatosplenomegaly                                                     Neuro:   non focal, awake, alert and oriented to person, place, time and situation    Extrm:   no leg edema, clubbing or cyanosis       Skin:   no rash    Data review:     Orders Only on 10/03/2020   Component Date Value Ref Range Status    Hemoglobin A1c 10/02/2020 7.0* 4.8 - 5.6 % Final    AVG GLU 10/02/2020 155* 91 - 123 mg/dL Final    Comment: 5.7 - 6.4% is indicative of prediabetes.   > 6.4% is indicative of diabetes.  VITAMIN D, 25-HYDROXY 10/02/2020 33.4  32.0 - 100.0 ng/mL Final   Orders Only on 10/02/2020   Component Date Value Ref Range Status    Creatinine, urine 10/02/2020 195  mg/dL Final    Microalbumin, urine 10/02/2020 12.0  0.1 - 17.0 mg/L Final    Microalb/Creat ratio (ug/mg creat.) 10/02/2020 6.2  0.0 - 30.0 Final    Iron 10/02/2020 107  30 - 160 mcg/dL Final    UIBC 10/02/2020 154  110 - 370 mcg/dL Final    TIBC 10/02/2020 261  228 - 428 mcg/dL Final    Iron % saturation 10/02/2020 41  20 - 50 % Final   Orders Only on 10/01/2020   Component Date Value Ref Range Status    WBC 10/02/2020 6.1  4.0 - 11.0 K/uL Final    RBC 10/02/2020 4.43  3.80 - 5.20 M/uL Final    HGB 10/02/2020 13.3  11.7 - 16.1 g/dL Final    HCT 10/02/2020 44.3  35.1 - 48.3 % Final    MCV 10/02/2020 100* 81 - 99 fL Final    MCH 10/02/2020 30  26 - 34 pg Final    MCHC 10/02/2020 30* 31 - 36 g/dL Final    RDW 10/02/2020 13.6  10.0 - 15.5 % Final    PLATELET 40/74/9215 157  140 - 440 K/uL Final    MPV 10/02/2020 10.2  9.0 - 13.0 fL Final    Glucose 10/02/2020 85  70 - 99 mg/dL Final    BUN 10/02/2020 14  6 - 22 mg/dL Final    Creatinine 10/02/2020 0.6* 0.8 - 1.4 mg/dL Final    Sodium 10/02/2020 143  133 - 145 mmol/L Final    Potassium 10/02/2020 4.4  3.5 - 5.5 mmol/L Final    Chloride 10/02/2020 100  98 - 110 mmol/L Final    CO2 10/02/2020 22  20 - 32 mmol/L Final    AST (SGOT) 10/02/2020 18  10 - 37 U/L Final    ALT (SGPT) 10/02/2020 21  5 - 40 U/L Final    Alk.  phosphatase 10/02/2020 49  40 - 120 U/L Final    Bilirubin, total 10/02/2020 0.3  0.2 - 1.2 mg/dL Final    Calcium 10/02/2020 9.4  8.4 - 10.5 mg/dL Final    Protein, total 10/02/2020 6.2  6.2 - 8.1 g/dL Final    Albumin 10/02/2020 4.5  3.5 - 5.0 g/dL Final    A-G Ratio 10/02/2020 2.6  1.1 - 2.6 ratio Final    Globulin 10/02/2020 1.7* 2.0 - 4.0 g/dL Final    Anion gap 10/02/2020 21.1* 3.0 - 15.0 mmol/L Final    Comment: Anion Gap calculation based on electrolyte reference ranges. Test includes Albumin, Alkaline Phosphatase, ALT, AST, BUN, Calcium, CO2,  Chloride, Creatinine, Glucose, Potassium, Sodium, Total Bilirubin and Total  Protein. Estimated GFR results are reported in mL/min/1.73 sq.m. by the MDRD equation. This eGFR is validated for stable chronic renal failure patients. This   equation  is unreliable in acute illness or patients with normal renal function.  GFRAA 10/02/2020 >60.0  >60.0 Final    GFRNA 10/02/2020 >60.0  >60.0 Final     PULMONARY FUNCTION TESTS              Imaging:  I have personally reviewed the patients radiographs and have reviewed the reports:  XR Results (most recent):  Results from Appointment encounter on 06/05/19   XR CHEST PA LAT    Narrative EXAM: Chest Radiographs    INDICATION: Cough    TECHNIQUE: PA and lateral views of the chest    COMPARISON: 11/7/2018, 4/4/2014 and CT dated 2/15/2018    FINDINGS: No pneumothorax identified. Mild biapical scarring is noted. This is  unchanged. In the region of the lingula, there is a rounded density measuring  approximately 2.8 x 3.5 cm with adjacent groundglass opacity. This may represent  dense consolidation in the setting of pneumonia with adjacent groundglass  infiltrates. However, an underlying mass is not excluded. This is new since  prior imaging. No effusions appreciated. The cardiomediastinal silhouette is  unremarkable. The pulmonary vascularity is unremarkable. The osseous structures  are unremarkable. Impression IMPRESSION:  1. Rounded mass or consolidation with adjacent groundglass opacity. This could  potentially represent infectious process or neoplasia. Further evaluation with  CT with IV contrast is recommended.      CT Results (most recent):  Results from Hospital Encounter encounter on 04/11/20   CT SPINE CERV WO CONT    Narrative EXAM: CT SPINE CERV WO CONT    CLINICAL INDICATION/HISTORY: fall , pain, struck right side of head on floor;  has abrasions/swelling    TECHNIQUE: Contiguous 2.5 mm axial images were obtained through the cervical  spine. From these, sagittal and coronal reconstructions were generated. CT scans at this facility are performed using dose optimization technique as  appropriate with performed exam, to include automated exposure control,  adjustment of mA and/or kV according to patient's size (including appropriate  matching for site-specific examinations), or use of iterative reconstruction  technique. COMPARISON: CT 2009    FINDINGS:    Acute findings: No acute fracture or traumatic subluxation. Alignment: Intact    Degenerative findings:   -There is trace degenerative anterolisthesis of C6 on C7  -Multilevel facet arthropathy  -No significant disc space narrowing  -No high-grade spinal or foraminal stenosis    Other structures: Biapical lung fibrosis right more than left, partially imaged.  -A less than 2 cm cystic hypodensity at the left thyroid lobe. Likely a  pyramidal lobe of left thyroid with small nodular hypodensities as well. Chronically unchanged. Impression IMPRESSION[de-identified]    1. No acute traumatic finding of cervical spine. 2. Mild to moderate degenerative findings, primarily facet arthropathy, as  discussed above. Thank you for this referral.          Patient Active Problem List   Diagnosis Code    Bronchiectasis Dr. Marta Shaffer J47.9    Osteopenia  M85.80    Hyperlipidemia E78.5    Gastroesophageal reflux disease without esophagitis K21.9    Uncontrolled type 2 diabetes mellitus [id number]    History of breast cancer Z85.3    Vitamin D deficiency E55.9    Overweight (BMI 25.0-29. 9) E66.3       IMPRESSION:   · Bronchiectasis-without acute exacerbation. predominant RLL imaging findings  · GERD  · Cough secondary to above  · COVID-19 infection-December 2020-symptoms resolved      RECOMMENDATIONS:      · Continue with Advair and albuterol as needed  · Take OTC mucinex as needed for cough  · Discussed bronchial hygeine - increase PO fluids, humidification  · Follow up in 6 months or sooner with worsening / unresolved symptoms          Emani Lewis MD

## 2021-02-25 NOTE — LETTER
2/26/2021    Patient: Geovanna Buchanan   YOB: 1950   Date of Visit: 2/25/2021     Lloyd Jack MD  [address] [facility]  [address]  [place]    Dear Lloyd Jack MD,      Thank you for referring Ms. Devin Sarmiento to [address] [place] [address] for evaluation. My notes for this consultation are attached. If you have questions, please do not hesitate to call me. I look forward to following your patient along with you.       Sincerely,    Christie Perez MD

## 2021-02-25 NOTE — PROGRESS NOTES
Jayne Tyler presents today for   Chief Complaint   Patient presents with    Follow Up Chronic Condition       Is someone accompanying this pt? No    Is the patient using any DME equipment during OV? No    -DME Company NA    Depression Screening:  3 most recent PHQ Screens 10/13/2020   Little interest or pleasure in doing things Not at all   Feeling down, depressed, irritable, or hopeless Not at all   Total Score PHQ 2 0       Learning Assessment:  Learning Assessment 5/31/2019   PRIMARY LEARNER Patient   HIGHEST LEVEL OF EDUCATION - PRIMARY LEARNER  > 4 YEARS [facility] PRIMARY LEARNER NONE   CO-LEARNER CAREGIVER No   PRIMARY LANGUAGE ENGLISH    NEED -   LEARNER PREFERENCE PRIMARY READING     -   ANSWERED BY PATIENT   RELATIONSHIP SELF       Abuse Screening:  Abuse Screening Questionnaire 10/13/2020   Do you ever feel afraid of your partner? N   Are you in a relationship with someone who physically or mentally threatens you? N   Is it safe for you to go home? Y       Fall Risk  Fall Risk Assessment, last 12 mths 10/13/2020   Able to walk? Yes   Fall in past 12 months? No   Number of falls in past 12 months -   Fall with injury? -         Coordination of Care:  1. Have you been to the ER, urgent care clinic since your last visit? Hospitalized since your last visit? No    2. Have you seen or consulted any other health care providers outside of the [address] [place] [address] since your last visit? Include any pap smears or colon screening. No    Patient has not had Covid Vaccine. Flu and Pneumo vaccines are current.

## 2021-04-07 ENCOUNTER — HOSPITAL ENCOUNTER (OUTPATIENT)
Dept: LAB | Age: 71
Discharge: HOME | End: 2021-04-07
Payer: COMMERCIAL

## 2021-04-07 ENCOUNTER — APPOINTMENT (OUTPATIENT)
Dept: INTERNAL MEDICINE CLINIC | Age: 71
End: 2021-04-07

## 2021-04-07 LAB
25(OH)D3 SERPL-MCNC: 71.3 NG/ML (ref 30–100)
CHOLEST SERPL-MCNC: 163 MG/DL
ERYTHROCYTE [DISTWIDTH] IN BLOOD BY AUTOMATED COUNT: 12.8 % (ref 11.6–14.5)
EST. AVERAGE GLUCOSE BLD GHB EST-MCNC: 171 MG/DL
HBA1C MFR BLD: 7.6 % (ref 4.2–5.6)
HCT VFR BLD AUTO: 43.9 % (ref 35–45)
HDLC SERPL-MCNC: 52 MG/DL (ref 40–60)
HDLC SERPL: 3.1 {RATIO} (ref 0–5)
HGB BLD-MCNC: 14.2 G/DL (ref 12–16)
LDLC SERPL CALC-MCNC: 49.6 MG/DL (ref 0–100)
LIPID PROFILE,FLP: ABNORMAL
MCH RBC QN AUTO: 29.7 PG (ref 24–34)
MCHC RBC AUTO-ENTMCNC: 32.3 G/DL (ref 31–37)
MCV RBC AUTO: 91.8 FL (ref 74–97)
PLATELET # BLD AUTO: 232 K/UL (ref 135–420)
PMV BLD AUTO: 9.9 FL (ref 9.2–11.8)
RBC # BLD AUTO: 4.78 M/UL (ref 4.2–5.3)
TRIGL SERPL-MCNC: 307 MG/DL (ref ?–150)
VLDLC SERPL CALC-MCNC: 61.4 MG/DL
WBC # BLD AUTO: 6.4 K/UL (ref 4.6–13.2)

## 2021-04-07 PROCEDURE — 82306 VITAMIN D 25 HYDROXY: CPT

## 2021-04-07 PROCEDURE — 80061 LIPID PANEL: CPT

## 2021-04-07 PROCEDURE — 83036 HEMOGLOBIN GLYCOSYLATED A1C: CPT

## 2021-04-07 PROCEDURE — 85027 COMPLETE CBC AUTOMATED: CPT

## 2021-04-08 NOTE — PROGRESS NOTES
78 y.o. WHITE female who presents for evaluation. She continues to tamox for RIGHT breast ca being followed by Dr Annia Kaufman 1-2x/year    Continues to do at least 8k steps daily on her counter, no cardiovascular complaints. Remains slightly tachy, had eval by Dr Mal Calderón 2018     Denies polyuria, polydipsia, nocturia, vision change. 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 Weight is stable    Remains on PPI and iron supplementation, no GI complaints to report    Continues to see Dr Harpal Abbott and only using the advair prn      LAST MEDICARE WELLNESS EXAM: never as not medicare b    Past Medical History:   Diagnosis Date    ADD (attention deficit disorder) 2014    [facility] psych    Amebic colitis 1970s    Anemia, iron deficiency 05/2018    eval Dr Tanvir Cornejo showed gastritis    Breast cancer ([facility] [address] 02/2016    Dr Annia Kaufman; RIGHT partial mastectomy, adjuvant XRT    Bronchiectasis     Dr. Harpal Abbott; RLL    COVID-19 virus infection 12/2020    Diabetes mellitus ([facility] [address] 09/2013    on basis of elev a1c    Fibrocystic breast disease     Dr. Loren Thomas GERD (gastroesophageal reflux disease)     Dr. Tanvir Cornejo s/p dilation 2/12    H/O pulmonary function tests 2011    ratio 70, FEV1 81 no change postbd, TLC 93, RV 94, DLCO 81    Hyperlipidemia     Hypovitaminosis D     Insomnia     Osteoarthritis, knee     s/p cortisone    Osteopenia     DEXA t-score -1.2 spine, -0.2 hip (4/08);  spine -1.2, hip 0.1 (10/10);  -1.8 spine, -0.3 FRAX 6.3/0.2 (7/13); [facility] -1.7 spine, 0.1 hip (4/17)    Overweight (BMI 25.0-29. 9)     IF 5/18 but unwilling to do; phentermine 1/19 start weight 145 lbs    Rotator cuff injury 2000s    conservative therapy LEFT    Sinus tachycardia 2018    Dr Mal Calderón    Spontaneous pneumothorax 1970s    x3    Zoster right S1 3/12     Past Surgical History:   Procedure Laterality Date    HX BREAST BIOPSY      RIGHT breast biopsy (4/12); RIGHT breast lumpectomy ()   Levar Cleary negative ; diverticulosis  Dr Deandra Olson; Dr Elijah Cleary 17 neg    HX ENDOSCOPY  2018    Dr Elijah Cleary; gastropathy, h pylori neg    HX HYSTERECTOMY      fibroids/ovarian cysts - Dr. 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     Employer: [employer]   Social Needs    Financial resource strain: Not on file    Food insecurity     Worry: Not on file     Inability: Not on file   [employer] needs     Medical: Not on file     Non-medical: Not on file   Tobacco Use    Smoking status: Former Smoker     Packs/day: 1.00     Years: 5.00     Pack years: 5.00     Quit date: 1973     Years since quittin.7    Smokeless tobacco: Never Used   Substance and Sexual Activity    Alcohol use: No    Drug use: No    Sexual activity: Not on file   Lifestyle    Physical activity     Days per week: Not on file     Minutes per session: Not on file    Stress: Not on file   Relationships    Social connections     Talks on phone: Not on file     Gets together: Not on file     Attends Yazdanism service: Not on file     Active member of club or organization: Not on file     Attends meetings of clubs or organizations: Not on file     Relationship status: Not on file    Intimate partner violence     Fear of current or ex partner: Not on file     Emotionally abused: Not on file Physically abused: Not on file     Forced sexual activity: Not on file   Other Topics Concern    Not on file   Social History Narrative    Not on file     Current Outpatient Medications   Medication Sig    cyanocobalamin 1,000 mcg tablet Take 1,000 mcg by mouth daily.  vitamin e (E GEMS) 100 unit capsule Take 100 Units by mouth daily.  repaglinide (PRANDIN) 0.5 mg tablet Take 1 Tab by mouth Before breakfast, lunch, and dinner.  albuterol (Ventolin HFA) 90 mcg/actuation inhaler Take 2 Puffs by inhalation every six (6) hours as needed for Wheezing. Indications: bronchospasm    diazePAM (VALIUM) 5 mg tablet Take 1 Tab by mouth every twelve (12) hours as needed for Anxiety. Max Daily Amount: 10 mg.    ergocalciferol (ERGOCALCIFEROL) 1,250 mcg (50,000 unit) capsule TAKE 1 CAPSULE BY MOUTH 1 TIME PER WEEK    pravastatin (PRAVACHOL) 40 mg tablet TAKE 1 TABLET BY MOUTH NIGHTLY.  Januvia 100 mg tablet TAKE 1 TABLET BY MOUTH DAILY.  zinc sulfate (ZINC-15 PO) Take  by mouth.  ferrous sulfate (Iron) 325 mg (65 mg iron) tablet Take  by mouth Daily (before breakfast).  calcium carbonate (CALCIUM 300 PO) Take  by mouth.  dexlansoprazole (Dexilant) 60 mg CpDB capsule (delayed release) Take 1 Cap by mouth daily.  ADVAIR DISKUS 250-50 mcg/dose diskus inhaler TAKE 1 PUFF BY INHALATION TWO (2) TIMES A DAY. (Patient taking differently: TAKE 1 PUFF BY INHALATION as needed)    tamoxifen (NOLVADEX) 10 mg tablet Take  by mouth daily.  b complex vitamins (B COMPLEX 1) tablet Take 1 Tab by mouth daily. No current facility-administered medications for this visit.       Allergies   Allergen Reactions    Actos [Pioglitazone] Other (comments)     Felt bad, weight gain    Avelox [Moxifloxacin] Other (comments)     Felt jittery    Crestor [Rosuvastatin] Myalgia    Evista [Raloxifene] Other (comments)     Severe flashes    Jardiance [Empagliflozin] Other (comments)     Recurring yeast infections    Lipitor [Atorvastatin] Other (comments)     Leg cramps and muscle aches    Macrobid [Nitrofurantoin Monohyd/M-Cryst] Hives    Metformin Other (comments)     Gi intolerance    Neuromuscular Blockers, Steroidal Other (comments)     \"patient feels crazy\"    Nsaids (Non-Steroidal Anti-Inflammatory Drug) Other (comments)     H/o gastropathy    Phentermine Other (comments)     Felt bad     REVIEW OF SYSTEMS: gyn , mammo  Dr Marck Jaramillo, [address] , [place]  Dr Laveda Apgar, Dr Joanne Flannery  no vision change or eye pain  Oral  no mouth pain, tongue or tooth problems  Ears  no hearing loss, ear pain, fullness, no swallowing problems  Cardiac  no CP, PND, orthopnea, edema, palpitations or syncope  Chest  no breast masses  Resp  no wheezing, chronic coughing, dyspnea  GI  no heartburn, nausea, vomiting, change in bowel habits, bleeding, hemorrhoids  Urinary  no dysuria, hematuria, flank pain, urgency, frequency    Visit Vitals  BP (!) 110/52   Pulse (!) 116   Temp 97.5 °F (36.4 °C) (Temporal)   Resp 14   Ht 5' 3\" (1.6 m)   Wt 143 lb (64.9 kg)   SpO2 97%   BMI 25.33 kg/m²   A&O x3  Affect is appropriate. Mood stable  No apparent distress  Anicteric, no JVD, adenopathy or thyromegaly. No carotid bruits or radiated murmur  Lungs clear to auscultation, no wheezes or rales  Heart showed regular rate and rhythm. No murmur, rubs, gallops  Abdomen soft nontender, no hepatosplenomegaly or masses. Extremities without edema.   Pulses 1-2+ symmetrically    LABS  From 9/10 showed   gluc 106, cr 0.70, gfr>60, alt 42,                   chol 238, tg 154, hdl 54, ldl-c 153, wbc 6.5, hb 13.1, plt 258, ua neg  From  showed   gluc 95,   cr 0.90                                                                                                   wbc 7.0, hb 14.1, plt 308  From 3/12 showed   gluc 103, cr 0.90              alt 43,                                                                           wbc 6.2, hb 13.7, plt 285  From 3/13 showed   gluc 130, cr 0.60,             alt 22,                    chol 244, tg 180, hdl 57, ldl-c 151,                                          ua neg,     tsh 1.79  From 9/13 showed   gluc 119, cr 0.70, gfr>60,            hba1c 6.6, chol 228, tg 145, hdl 62, ldl-c 137, 2hr   From 2/14 showed   gluc 95,   cr 0.60, gfr>61, alt 20, hba1c 5.7,                    umar 2.8  From 4/14 showed   gluc 144, cr 0.98, gfr>60, alt 31,          wbc 7.6, hb 13.4, plt 317,                  ck/trop neg  From 10/14 showed gluc 98,   cr 0.60, gfr>60, alt 20,          chol 163, tg 96,   hdl 64, ldl-c 80  From 4/15 showed   gluc 96,   cr 0.70, gfr>60, alt 19, hba1c 6.1, chol 207, tg 126, hdl 68, ldl-c 114  From 10/15 showed        hba1c 6.3, chol 188, tg 118, hdl 66, ldl-c 98,   wbc 5.3, hb 13.3, plt 287, umar neg  From 4/16 showed   gluc 98,   cr 0.60, gfr>60, alt 19, hba1c 6.3, chol 170, tg 141, hdl 57, ldl-c 85  From 10/16 showed gluc 137, cr 0.50, gfr>60, alt 17, hba1c 6.2, chol 176, tg 302, hdl 54, ldl-c 62,   wbc 8.9, hb 12.3, plt 393, umar neg  From 1/17 showed   gluc 114, cr 0.60, gfr>60,           wbc 6.1, hb 12.9, plt 356  From 4/17 showed        hba1c 6.8, chol 223, tg 240, hdl 53, ldl-c 122  From 9/17 showed   gluc 109, cr 0.70, gfr>60, alt 16, hba1c 6.7,                  umar neg  From 1/18 showed   gluc 104, cr 0.60, gfr>60, alt 14, hba1c 6.8, chol 154, tg 197, hdl 60, ldl-c 55  From 5/18 showed        hba1c 7.0,          wbc 5.7, hb 11.3, plt 281, umar neg, fe 43, %sat 11, ferritin 7,   b12 588, fol 18.6, spep neg, retic 1.6  From 9/18 showed   gluc 143, cr 0.70, gfr>60,    hba1c 7.0, chol 155, tg 199, hdl 56, ldl-c 59  From 1/19 showed                               fe 80, %sat 28, ferritin 28, vit d 37.1  From 5/19 showed   gluc 155, cr 0.70, gfr>60, alt 22, hba1c 7.4, chol 139, tg 172, hdl 49, ldl-c 55,   wbc 6.0, hb 13.3, plt 214, umar neg  From 10/19 showed        hba1c 6.8, umar 9.8,  fe 79, %sat 29,           vit d 48.7  From 4/20 showed        hba1c 7.0, chol 142, tg 115, hdl 50, ldl-c 69                  vit d 42.8  From 10/20 showed gluc 85,   cr 0.60, gfr>60, alt 21, hba1c 7.0,          wbc 6.1, hb 13.3, plt 248,              vit d 33.4    Results for orders placed or performed during the hospital encounter of 04/07/21   VITAMIN D, 25 HYDROXY   Result Value Ref Range    Vitamin D 25-Hydroxy 71.3 30 - 100 ng/mL   HEMOGLOBIN A1C WITH EAG   Result Value Ref Range    Hemoglobin A1c 7.6 (H) 4.2 - 5.6 %    Est. average glucose 171 mg/dL   CBC W/O DIFF   Result Value Ref Range    WBC 6.4 4.6 - 13.2 K/uL    RBC 4.78 4.20 - 5.30 M/uL    HGB 14.2 12.0 - 16.0 g/dL    HCT 43.9 35.0 - 45.0 %    MCV 91.8 74.0 - 97.0 FL    MCH 29.7 24.0 - 34.0 PG    MCHC 32.3 31.0 - 37.0 g/dL    RDW 12.8 11.6 - 14.5 %    PLATELET 612 465 - 527 K/uL    MPV 9.9 9.2 - 11.8 FL   LIPID PANEL   Result Value Ref Range    LIPID PROFILE          Cholesterol, total 163 <200 MG/DL    Triglyceride 307 (H) <150 MG/DL    HDL Cholesterol 52 40 - 60 MG/DL    LDL, calculated 49.6 0 - 100 MG/DL    VLDL, calculated 61.4 MG/DL    CHOL/HDL Ratio 3.1 0 - 5.0       We reviewed the patient's labs from the last several visits to point out trends in the numbers          Patient Active Problem List   Diagnosis Code    Bronchiectasis Dr. Melva Nelson J47.9    Osteopenia  M85.80    Hyperlipidemia E78.5    Gastroesophageal reflux disease without esophagitis K21.9    Type II diabetes mellitus, uncontrolled ([facility] [address] E11.65    History of breast cancer Z85.3    Vitamin D deficiency E55.9    Overweight (BMI 25.0-29. 9) E66.3     Assessment and plan:  1. Pulm. F/U Dr Rebel Lee and continue current  2. GERD. Continue ppi and avoidance measures  3. Osteopenia. Ca/d, wt bearing exercises  4. DM. She will tighten up the diet, start prandin as well. Call in readings in 4 weeks, watch for hypo, adjust dosing as indicated  5. Lipids.   Continue current regimen, vascepa once covered and she will check on generic  6  Breast ca. Per Dr Gene Nair  7. Iron def anemia. Resolved  8. Overweight. Lifestyle and dietary measures. Portion control reiterated. 9. She is reluctant to take the covid vaccine due to concerns about sfx so we discussed that at length and went over the rationale as to why she should take it      RTC 8/21    Above conditions discussed at length and patient vocalized understanding.   All questions answered to patient satisfaction

## 2021-04-12 DIAGNOSIS — D50.9 IRON DEFICIENCY ANEMIA, UNSPECIFIED IRON DEFICIENCY ANEMIA TYPE: ICD-10-CM

## 2021-04-12 DIAGNOSIS — E78.5 HYPERLIPIDEMIA, UNSPECIFIED HYPERLIPIDEMIA TYPE: ICD-10-CM

## 2021-04-13 ENCOUNTER — OFFICE VISIT (OUTPATIENT)
Dept: INTERNAL MEDICINE CLINIC | Age: 71
End: 2021-04-13
Payer: COMMERCIAL

## 2021-04-13 VITALS
HEART RATE: 116 BPM | HEIGHT: 63 IN | TEMPERATURE: 97.5 F | WEIGHT: 143 LBS | DIASTOLIC BLOOD PRESSURE: 52 MMHG | OXYGEN SATURATION: 97 % | BODY MASS INDEX: 25.34 KG/M2 | RESPIRATION RATE: 14 BRPM | SYSTOLIC BLOOD PRESSURE: 110 MMHG

## 2021-04-13 DIAGNOSIS — E78.5 HYPERLIPIDEMIA, UNSPECIFIED HYPERLIPIDEMIA TYPE: ICD-10-CM

## 2021-04-13 DIAGNOSIS — M85.80 OSTEOPENIA, UNSPECIFIED LOCATION: ICD-10-CM

## 2021-04-13 DIAGNOSIS — E11.9 CONTROLLED TYPE 2 DIABETES MELLITUS WITHOUT COMPLICATION, WITHOUT LONG-TERM CURRENT USE OF INSULIN (HCC): ICD-10-CM

## 2021-04-13 DIAGNOSIS — E11.9 CONTROLLED TYPE 2 DIABETES MELLITUS WITHOUT COMPLICATION, WITHOUT LONG-TERM CURRENT USE OF INSULIN (HCC): Primary | ICD-10-CM

## 2021-04-13 DIAGNOSIS — Z85.3 HISTORY OF BREAST CANCER: ICD-10-CM

## 2021-04-13 DIAGNOSIS — J47.9 BRONCHIECTASIS WITHOUT COMPLICATION (HCC): ICD-10-CM

## 2021-04-13 DIAGNOSIS — E11.65 UNCONTROLLED TYPE 2 DIABETES MELLITUS WITH HYPERGLYCEMIA (HCC): ICD-10-CM

## 2021-04-13 PROBLEM — U07.1 COVID-19: Status: RESOLVED | Noted: 2021-02-25 | Resolved: 2021-04-13

## 2021-04-13 PROCEDURE — 99214 OFFICE O/P EST MOD 30 MIN: CPT | Performed by: INTERNAL MEDICINE

## 2021-04-13 PROCEDURE — 3051F HG A1C>EQUAL 7.0%<8.0%: CPT | Performed by: INTERNAL MEDICINE

## 2021-04-13 RX ORDER — VITAMIN E CAP 100 UNIT 100 UNIT
100 CAP ORAL DAILY
COMMUNITY

## 2021-04-13 RX ORDER — LANOLIN ALCOHOL/MO/W.PET/CERES
1000 CREAM (GRAM) TOPICAL DAILY
COMMUNITY

## 2021-04-13 RX ORDER — REPAGLINIDE 0.5 MG/1
0.5 TABLET ORAL
Qty: 90 TAB | Refills: 11 | Status: SHIPPED | OUTPATIENT
Start: 2021-04-13 | End: 2022-01-28 | Stop reason: ALTCHOICE

## 2021-04-13 NOTE — PROGRESS NOTES
Lalita Zapata presents today for   Chief Complaint   Patient presents with    Diabetes     6 month f/u with labs              Depression Screening:  3 most recent PHQ Screens 4/13/2021   Little interest or pleasure in doing things Not at all   Feeling down, depressed, irritable, or hopeless Not at all   Total Score PHQ 2 0       Learning Assessment:  Learning Assessment 5/31/2019   PRIMARY LEARNER Patient   HIGHEST LEVEL OF EDUCATION - PRIMARY LEARNER  > 4 YEARS Jacquesmicheal PRIMARY LEARNER NONE   CO-LEARNER CAREGIVER No   PRIMARY LANGUAGE ENGLISH    NEED -   LEARNER PREFERENCE PRIMARY READING     -   ANSWERED BY PATIENT   RELATIONSHIP SELF       Abuse Screening:  Abuse Screening Questionnaire 4/13/2021   Do you ever feel afraid of your partner? N   Are you in a relationship with someone who physically or mentally threatens you? N   Is it safe for you to go home? Y       Fall Risk  Fall Risk Assessment, last 12 mths 4/13/2021   Able to walk? Yes   Fall in past 12 months? 0   Do you feel unsteady? 0   Are you worried about falling 0   Number of falls in past 12 months -   Fall with injury? -           Coordination of Care:  1. Have you been to the ER, urgent care clinic since your last visit? Hospitalized since your last visit? no    2. Have you seen or consulted any other health care providers outside of the [address] [place] [address] since your last visit? Include any pap smears or colon screening.  no

## 2021-04-14 DIAGNOSIS — E55.9 VITAMIN D DEFICIENCY: ICD-10-CM

## 2021-04-26 DIAGNOSIS — F41.9 ANXIETY: ICD-10-CM

## 2021-04-26 NOTE — TELEPHONE ENCOUNTER
[facility]20[facility]21 for a 20 d/s. UDS done 12/10/19    Last Visit: 4/13/21 with MD Pritesh Ramon  Next Appointment: 8/13/21 with MD Pritesh Ramon  Previous Refill Encounter(s): 2/19/21 #40 with 1 refill    Requested Prescriptions     Pending Prescriptions Disp Refills    diazePAM (VALIUM) 5 mg tablet 40 Tab 1     Sig: Take 1 Tab by mouth every twelve (12) hours as needed for Anxiety. Max Daily Amount: 10 mg.

## 2021-04-28 RX ORDER — DIAZEPAM 5 MG/1
5 TABLET ORAL
Qty: 40 TAB | Refills: 1 | Status: SHIPPED | OUTPATIENT
Start: 2021-04-28 | End: 2021-07-07 | Stop reason: SDUPTHER

## 2021-05-30 RX ORDER — DEXLANSOPRAZOLE 60 MG/1
CAPSULE, DELAYED RELEASE ORAL
Qty: 90 CAPSULE | Refills: 3 | Status: SHIPPED | OUTPATIENT
Start: 2021-05-30 | End: 2022-07-01

## 2021-06-23 ENCOUNTER — TELEPHONE (OUTPATIENT)
Dept: INTERNAL MEDICINE CLINIC | Age: 71
End: 2021-06-23

## 2021-06-23 DIAGNOSIS — M19.90 OSTEOARTHRITIS, UNSPECIFIED OSTEOARTHRITIS TYPE, UNSPECIFIED SITE: Primary | ICD-10-CM

## 2021-06-23 NOTE — TELEPHONE ENCOUNTER
Patient was referred to Dr. Rose Marie Garcia back in September. She was never scheduled. They need a new referral to be sent to them so they can get her scheduled.

## 2021-07-01 ENCOUNTER — HOSPITAL ENCOUNTER (OUTPATIENT)
Dept: GENERAL RADIOLOGY | Age: 71
Discharge: HOME | End: 2021-07-01
Payer: COMMERCIAL

## 2021-07-01 ENCOUNTER — OFFICE VISIT (OUTPATIENT)
Dept: INTERNAL MEDICINE CLINIC | Age: 71
End: 2021-07-01
Payer: COMMERCIAL

## 2021-07-01 VITALS
OXYGEN SATURATION: 96 % | HEART RATE: 92 BPM | TEMPERATURE: 97.5 F | HEIGHT: 63 IN | DIASTOLIC BLOOD PRESSURE: 63 MMHG | WEIGHT: 133 LBS | RESPIRATION RATE: 16 BRPM | SYSTOLIC BLOOD PRESSURE: 138 MMHG | BODY MASS INDEX: 23.57 KG/M2

## 2021-07-01 DIAGNOSIS — M25.552 LEFT HIP PAIN: ICD-10-CM

## 2021-07-01 DIAGNOSIS — M53.3 CHRONIC LEFT SI JOINT PAIN: ICD-10-CM

## 2021-07-01 DIAGNOSIS — M53.3 CHRONIC LEFT SI JOINT PAIN: Primary | ICD-10-CM

## 2021-07-01 DIAGNOSIS — G89.29 CHRONIC LEFT SI JOINT PAIN: Primary | ICD-10-CM

## 2021-07-01 DIAGNOSIS — G89.29 CHRONIC LEFT SI JOINT PAIN: ICD-10-CM

## 2021-07-01 PROCEDURE — 73502 X-RAY EXAM HIP UNI 2-3 VIEWS: CPT

## 2021-07-01 PROCEDURE — 72220 X-RAY EXAM SACRUM TAILBONE: CPT

## 2021-07-01 PROCEDURE — 99214 OFFICE O/P EST MOD 30 MIN: CPT | Performed by: INTERNAL MEDICINE

## 2021-07-01 RX ORDER — CELECOXIB 100 MG/1
100 CAPSULE ORAL 2 TIMES DAILY
Qty: 60 CAPSULE | Refills: 1 | Status: SHIPPED | OUTPATIENT
Start: 2021-07-01 | End: 2021-08-19

## 2021-07-01 NOTE — PROGRESS NOTES
Kelly Bryan presents today for   Chief Complaint   Patient presents with    Hip Pain     X 3 months, continuous x 5 weeks              Coordination of Care:  1. Have you been to the ER, urgent care clinic since your last visit? Hospitalized since your last visit? NO    2. Have you seen or consulted any other health care providers outside of the [address] [place] [address] since your last visit? Include any pap smears or colon screening.  NO

## 2021-07-01 NOTE — PROGRESS NOTES
78 y.o. WHITE/NON- female who presents for evaluation. The last 4 months or so, she has been having progressively worsening pain in the left hip and buttock region. She does not recall any injury, falls, bruising, swelling, redness. She is not known to have hip arthritis although she is known to have knee arthritis. No prior history of lumbar, sacroiliac disease. She has been using daily low-dose Advil and it has been less and less effective. Denies any radicular symptoms going down the leg, no incontinence, saddle complaints. Past Medical History:   Diagnosis Date    ADD (attention deficit disorder) 2014    [facility] psych    Amebic colitis 1970s    Anemia, iron deficiency 05/2018    eval Dr Eula Taylor showed gastritis    Breast cancer ([facility] [address] 02/2016    Dr Reji Perze; RIGHT partial mastectomy, adjuvant XRT    Bronchiectasis     Dr. Flex Reina; RLL    COVID-19 virus infection 12/2020    Diabetes mellitus ([facility] [address] 09/2013    on basis of elev a1c    Fibrocystic breast disease     Dr. Chapis Hunt GERD (gastroesophageal reflux disease)     Dr. Eula Taylor s/p dilation 2/12    H/O pulmonary function tests 2011    ratio 70, FEV1 81 no change postbd, TLC 93, RV 94, DLCO 81    Hyperlipidemia     Hypovitaminosis D     Insomnia     Osteoarthritis, knee     s/p cortisone    Osteopenia     DEXA t-score -1.2 spine, -0.2 hip (4/08);  spine -1.2, hip 0.1 (10/10);  -1.8 spine, -0.3 FRAX 6.3/0.2 (7/13); [facility] -1.7 spine, 0.1 hip (4/17)    Overweight (BMI 25.0-29. 9)     IF 5/18 but unwilling to do; phentermine 1/19 start weight 145 lbs    Rotator cuff injury 2000s    conservative therapy LEFT    Sinus tachycardia 2018    Dr Cynthia Roldan Spontaneous pneumothorax 1970s    x3    Zoster right S1 3/12     Past Surgical History:   Procedure Laterality Date    HX BREAST BIOPSY      RIGHT breast biopsy (4/12); RIGHT breast lumpectomy (2/17)    HX COLONOSCOPY      Dr. Eula Taylor negative 2005; diverticulosis 12/13 Dr Kalani Long; Dr Gib Lundborg 17 neg    HX ENDOSCOPY  2018    Dr Gib Lundborg; gastropathy, h pylori neg    HX HYSTERECTOMY  1993    fibroids/ovarian cysts - Dr. 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     Employer: [employer]   Tobacco Use    Smoking status: Former Smoker     Packs/day: 1.00     Years: 5.00     Pack years: 5.00     Quit date: 1973     Years since quittin.9    Smokeless tobacco: Never Used   Substance and Sexual Activity    Alcohol use: No    Drug use: No    Sexual activity: Not on file   Other Topics Concern    Not on file   Social History Narrative    Not on file     Social Determinants of Health     Financial Resource Strain:     Difficulty of Paying Living Expenses:    Food Insecurity:     Worried About Running Out of Food in the Last Year:     [address] in the Last Year:    Transportation Needs:     Lack of Transportation (Medical):      Lack of Transportation (Non-Medical):    Physical Activity:     Days of Exercise per Week:     Minutes of Exercise per Session:    Stress:     Feeling of Stress :    Social Connections:     Frequency of Communication with Friends and Family:     Frequency of Social Gatherings with Friends and Family:     Attends Rastafarian Services:     Active Member of Clubs or Organizations:     Attends Club or Organization Meetings:     Marital Status:    Intimate Partner Violence:     Fear of Current or Ex-Partner:     Emotionally Abused:     Physically Abused:     Sexually Abused:      Current Outpatient Medications   Medication Sig    celecoxib (CELEBREX) 100 mg capsule Take 1 Capsule by mouth two (2) times a day for 60 days.  Dexilant 60 mg CpDB capsule (delayed release) TAKE 1 CAPSULE BY MOUTH EVERY DAY    diazePAM (VALIUM) 5 mg tablet Take 1 Tab by mouth every twelve (12) hours as needed for Anxiety. Max Daily Amount: 10 mg.    cyanocobalamin 1,000 mcg tablet Take 1,000 mcg by mouth daily.  vitamin e (E GEMS) 100 unit capsule Take 100 Units by mouth daily.  repaglinide (PRANDIN) 0.5 mg tablet Take 1 Tab by mouth Before breakfast, lunch, and dinner. (Patient taking differently: Take 0.5 mg by mouth Before breakfast, lunch, and dinner. Does not take regularly)    albuterol (Ventolin HFA) 90 mcg/actuation inhaler Take 2 Puffs by inhalation every six (6) hours as needed for Wheezing. Indications: bronchospasm    ergocalciferol (ERGOCALCIFEROL) 1,250 mcg (50,000 unit) capsule TAKE 1 CAPSULE BY MOUTH 1 TIME PER WEEK    pravastatin (PRAVACHOL) 40 mg tablet TAKE 1 TABLET BY MOUTH NIGHTLY.  Januvia 100 mg tablet TAKE 1 TABLET BY MOUTH DAILY.  zinc sulfate (ZINC-15 PO) Take  by mouth.  ferrous sulfate (Iron) 325 mg (65 mg iron) tablet Take  by mouth Daily (before breakfast).  calcium carbonate (CALCIUM 300 PO) Take  by mouth.  ADVAIR DISKUS 250-50 mcg/dose diskus inhaler TAKE 1 PUFF BY INHALATION TWO (2) TIMES A DAY. (Patient taking differently: TAKE 1 PUFF BY INHALATION as needed)    tamoxifen (NOLVADEX) 10 mg tablet Take  by mouth daily.  b complex vitamins (B COMPLEX 1) tablet Take 1 Tab by mouth daily. No current facility-administered medications for this visit.      Allergies   Allergen Reactions    Actos [Pioglitazone] Other (comments)     Felt bad, weight gain    Avelox [Moxifloxacin] Other (comments)     Felt jittery    Crestor [Rosuvastatin] Myalgia    Evista [Raloxifene] Other (comments)     Severe flashes    Jardiance [Empagliflozin] Other (comments)     Recurring yeast infections    Lipitor [Atorvastatin] Other (comments)     Leg cramps and muscle aches    Macrobid [Nitrofurantoin Monohyd/M-Cryst] Hives    Metformin Other (comments)     Gi intolerance    Neuromuscular Blockers, Steroidal Other (comments)     \"patient feels crazy\"    Nsaids (Non-Steroidal Anti-Inflammatory Drug) Other (comments)     H/o gastropathy    Phentermine Other (comments)     Felt bad     REVIEW OF SYSTEMS:   Ophtho  no vision change or eye pain  Oral  no mouth pain, tongue or tooth problems  Ears  no hearing loss, ear pain, fullness, no swallowing problems  Cardiac  no CP, PND, orthopnea, edema, palpitations or syncope  Chest  no breast masses  Resp  no wheezing, chronic coughing, dyspnea  GI  no heartburn, nausea, vomiting, change in bowel habits, bleeding, hemorrhoids  Urinary  no dysuria, hematuria, flank pain, urgency, frequency  Genitals  no genital lesions, discharge, masses, ulceration, warts    Visit Vitals  /63   Pulse 92   Temp 97.5 °F (36.4 °C) (Temporal)   Resp 16   Ht 5' 3\" (1.6 m)   Wt 133 lb (60.3 kg)   SpO2 96%   BMI 23.56 kg/m²   Back showed no CVA tenderness. Negative straight leg bilaterally, there was pain on rotation of the left hip joint posteriorly. There was reproduction of her pain on palpation of the left trochanter. Strength, sensation, reflexes intact in both lower extremities. Pulses 2+ DP and PT symmetrically. There was marked SI joint tenderness on palpation. Assessment and plan:  1. Hip bursitis. Declined orthopedic evaluation, we will try Celebrex  2. Rule out sacroiliac disease. X-ray to be done, as above  3. Rule out hip disease. X-ray to be done, as above  4. History of gastropathy. She is on PPI, we will give her Celebrex which will hopefully decrease risk of ulcer formation.   She will stop medication if she notices any GI complaints whatsoever. Orthopedics if no improvement or worsening complaints      Above conditions discussed at length and patient vocalized understanding.   All questions answered to patient satisfaction

## 2021-07-07 DIAGNOSIS — F41.9 ANXIETY: ICD-10-CM

## 2021-07-07 NOTE — TELEPHONE ENCOUNTER
[facility]30[facility]21 for a 20 d/s. Last Visit: 7/1/21 with MD Ainsley Patton  Next Appointment: 8/13/21 with MD Ainsley Patton  Previous Refill Encounter(s): 4/28/21 #40 with 1 refill    Requested Prescriptions     Pending Prescriptions Disp Refills    diazePAM (VALIUM) 5 mg tablet 40 Tablet 1     Sig: Take 1 Tablet by mouth every twelve (12) hours as needed for Anxiety. Max Daily Amount: 10 mg.

## 2021-07-08 RX ORDER — DIAZEPAM 5 MG/1
5 TABLET ORAL
Qty: 40 TABLET | Refills: 1 | Status: SHIPPED | OUTPATIENT
Start: 2021-07-08 | End: 2021-09-09 | Stop reason: SDUPTHER

## 2021-07-09 ENCOUNTER — TELEPHONE (OUTPATIENT)
Dept: INTERNAL MEDICINE CLINIC | Age: 71
End: 2021-07-09

## 2021-07-09 NOTE — TELEPHONE ENCOUNTER
Spoke with patient, she was given results and verbalized understanding.   She stated that she has been taking the celebrex as prescribed and that it was helping a great deal.

## 2021-07-09 NOTE — TELEPHONE ENCOUNTER
pls call    IMPRESSION     Sacrum/coccyx:  -No acute findings. Mild SI joint degenerative changes.     Left hip:  -No acute findings. Sacrum/coccyx ok  Left hip ok  Mild arthritis in sacroiliac  Is she better on celebrex?   If not, suggest ortho eval

## 2021-07-26 ENCOUNTER — TELEPHONE (OUTPATIENT)
Dept: INTERNAL MEDICINE CLINIC | Age: 71
End: 2021-07-26

## 2021-07-26 DIAGNOSIS — E11.65 UNCONTROLLED TYPE 2 DIABETES MELLITUS WITH HYPERGLYCEMIA (HCC): Primary | ICD-10-CM

## 2021-07-26 NOTE — TELEPHONE ENCOUNTER
Pt insurance no longer covers Januvia. She either needs a prior auth or she needs new rx for Tavarez Ligia or Onglyza which they cover.     [facility] [address]    She only has 1 pill left

## 2021-08-10 ENCOUNTER — HOSPITAL ENCOUNTER (OUTPATIENT)
Dept: LAB | Age: 71
Discharge: HOME | End: 2021-08-10

## 2021-08-10 DIAGNOSIS — E11.9 CONTROLLED TYPE 2 DIABETES MELLITUS WITHOUT COMPLICATION, WITHOUT LONG-TERM CURRENT USE OF INSULIN (HCC): ICD-10-CM

## 2021-08-10 LAB — XX-LABCORP SPECIMEN COL,LCBCF: NORMAL

## 2021-08-10 PROCEDURE — 99001 SPECIMEN HANDLING PT-LAB: CPT

## 2021-08-10 NOTE — PROGRESS NOTES
78 y.o. WHITE/NON- female who presents for evaluation. She continues to tamox for RIGHT breast ca being followed by Dr Merna Mcdowell 1-2x/year    Continues to do at least 6-8k steps daily on her counter, no cardiovascular complaints. Denies polyuria, polydipsia, nocturia, vision change. She got strict with the diet and has lost >10% of her weight! Sugar readings are lower on her monitor    Vitals 8/13/2021 7/1/2021 7/1/2021 4/13/2021 2/25/2021   Weight 129 lb  133 lb 143 lb 145 lb 9.6 oz     Remains on PPI and iron supplementation, no GI complaints to report    Continues to see Dr Maddy Samaniego and only using the advair prn      The celebrex has done well controlling the arthralgias    LAST MEDICARE WELLNESS EXAM: never as not medicare b    Past Medical History:   Diagnosis Date    ADD (attention deficit disorder) 2014    [facility] psych    Amebic colitis 1970s    Anemia, iron deficiency 05/2018    eval Dr Abel Aparicio showed gastritis    Breast cancer ([facility] [address] 02/2016    Dr Merna Mcdowell; RIGHT partial mastectomy, adjuvant XRT    Bronchiectasis     Dr. Maddy Samaniego; RLL    COVID-19 virus infection 12/2020    Diabetes mellitus ([facility] [address] 09/2013    on basis of elev a1c    Fibrocystic breast disease     Dr. Deangelo Lamas GERD (gastroesophageal reflux disease)     Dr. Abel Aparicio s/p dilation 2/12    H/O pulmonary function tests 2011    ratio 70, FEV1 81 no change postbd, TLC 93, RV 94, DLCO 81    Hyperlipidemia     Hypovitaminosis D     Insomnia     Osteoarthritis     knee s/p cortisone; SI joint on xray 7/21    Osteopenia     DEXA t-score -1.2 spine, -0.2 hip (4/08);  spine -1.2, hip 0.1 (10/10);  -1.8 spine, -0.3 FRAX 6.3/0.2 (7/13); [facility] -1.7 spine, 0.1 hip (4/17)    Overweight (BMI 25.0-29. 9)     IF 5/18 but unwilling to do; phentermine 1/19 start weight 145 lbs    Rotator cuff injury 2000s    conservative therapy LEFT    Sinus tachycardia 2018    Dr Ximena Stewart    Spontaneous pneumothorax 1970s    x3    Zoster right S1 3/12     Past Surgical History:   Procedure Laterality Date    HX BREAST BIOPSY      RIGHT breast biopsy (); RIGHT breast lumpectomy ()    HX COLONOSCOPY      Dr. Abel Aparicio negative ; diverticulosis  Dr Eldon Persaud; Dr Abel Aparicio 17 neg    HX ENDOSCOPY  2018    Dr Abel Aparicio; gastropathy, h pylori neg    HX HYSTERECTOMY      fibroids/ovarian cysts - Dr. 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     Employer: [employer]   Tobacco Use    Smoking status: Former Smoker     Packs/day: 1.00     Years: 5.00     Pack years: 5.00     Quit date: 1973     Years since quittin.0    Smokeless tobacco: Never Used   Substance and Sexual Activity    Alcohol use: No    Drug use: No    Sexual activity: Not on file   Other Topics Concern    Not on file   Social History Narrative    Not on file     Social Determinants of Health     Financial Resource Strain:     Difficulty of Paying Living Expenses:    Food Insecurity:     Worried About Running Out of Food in the Last Year:     [address] in the Last Year:    Transportation Needs:     Lack of Transportation (Medical):      Lack of Transportation (Non-Medical):    Physical Activity:     Days of Exercise per Week:     Minutes of Exercise per Session:    Stress:     Feeling of Stress :    Social Connections:     Frequency of Communication with Friends and Family:     Frequency of Social Gatherings with Friends and Family:     Attends Buddhism Services:     Active Member of Clubs or Organizations:     Attends Club or Organization Meetings:     Marital Status:    Intimate Partner Violence:     Fear of Current or Ex-Partner:     Emotionally Abused:     Physically Abused:     Sexually Abused:      Current Outpatient Medications   Medication Sig    sAXagliptin (ONGLYZA) 5 mg tab tablet Take 1 Tablet by mouth daily.  diazePAM (VALIUM) 5 mg tablet Take 1 Tablet by mouth every twelve (12) hours as needed for Anxiety. Max Daily Amount: 10 mg.    celecoxib (CELEBREX) 100 mg capsule Take 1 Capsule by mouth two (2) times a day for 60 days.  Dexilant 60 mg CpDB capsule (delayed release) TAKE 1 CAPSULE BY MOUTH EVERY DAY    cyanocobalamin 1,000 mcg tablet Take 1,000 mcg by mouth daily.  vitamin e (E GEMS) 100 unit capsule Take 100 Units by mouth daily.  albuterol (Ventolin HFA) 90 mcg/actuation inhaler Take 2 Puffs by inhalation every six (6) hours as needed for Wheezing. Indications: bronchospasm    ergocalciferol (ERGOCALCIFEROL) 1,250 mcg (50,000 unit) capsule TAKE 1 CAPSULE BY MOUTH 1 TIME PER WEEK    pravastatin (PRAVACHOL) 40 mg tablet TAKE 1 TABLET BY MOUTH NIGHTLY.  zinc sulfate (ZINC-15 PO) Take  by mouth.  ferrous sulfate (Iron) 325 mg (65 mg iron) tablet Take  by mouth Daily (before breakfast).  calcium carbonate (CALCIUM 300 PO) Take  by mouth.  ADVAIR DISKUS 250-50 mcg/dose diskus inhaler TAKE 1 PUFF BY INHALATION TWO (2) TIMES A DAY. (Patient taking differently: TAKE 1 PUFF BY INHALATION as needed)    tamoxifen (NOLVADEX) 10 mg tablet Take  by mouth daily.  b complex vitamins (B COMPLEX 1) tablet Take 1 Tab by mouth daily.  repaglinide (PRANDIN) 0.5 mg tablet Take 1 Tab by mouth Before breakfast, lunch, and dinner. (Patient not taking: Reported on 8/13/2021)     No current facility-administered medications for this visit. Allergies   Allergen Reactions    Actos [Pioglitazone] Other (comments)     Felt bad, weight gain    Avelox [Moxifloxacin] Other (comments)     Felt jittery    Crestor [Rosuvastatin] Myalgia    Evista [Raloxifene] Other (comments)     Severe flashes    Jardiance [Empagliflozin] Other (comments)     Recurring yeast infections    Lipitor [Atorvastatin] Other (comments)     Leg cramps and muscle aches    Macrobid [Nitrofurantoin Monohyd/M-Cryst] Hives    Metformin Other (comments)     Gi intolerance    Neuromuscular Blockers, Steroidal Other (comments)     \"patient feels crazy\"    Nsaids (Non-Steroidal Anti-Inflammatory Drug) Other (comments)     H/o gastropathy    Phentermine Other (comments)     Felt bad     REVIEW OF SYSTEMS: gyn 2012, mammo 9/20 Dr Peg Cornelius, [address] 4/17, colo 12/13 Dr Tatyana Goode, Dr Jean Santizo  no vision change or eye pain  Oral  no mouth pain, tongue or tooth problems  Ears  no hearing loss, ear pain, fullness, no swallowing problems  Cardiac  no CP, PND, orthopnea, edema, palpitations or syncope  Chest  no breast masses  Resp  no wheezing, chronic coughing, dyspnea  GI  no heartburn, nausea, vomiting, change in bowel habits, bleeding, hemorrhoids  Urinary  no dysuria, hematuria, flank pain, urgency, frequency    Visit Vitals  /71 (BP 1 Location: Left upper arm, BP Patient Position: Sitting)   Pulse (!) 102   Temp 98.1 °F (36.7 °C) (Temporal)   Resp 12   Ht 5' 3\" (1.6 m)   Wt 129 lb (58.5 kg)   SpO2 96%   BMI 22.85 kg/m²   A&O x3  Affect is appropriate. Mood stable  No apparent distress  Anicteric, no JVD, adenopathy or thyromegaly. No carotid bruits or radiated murmur  Lungs clear to auscultation, no wheezes or rales  Heart showed regular rate and rhythm. No murmur, rubs, gallops  Abdomen soft nontender, no hepatosplenomegaly or masses. Extremities without edema.   Pulses 1-2+ symmetrically    LABS  From 9/10 showed   gluc 106, cr 0.70, gfr>60, alt 42, chol 238, tg 154, hdl 54, ldl-c 153, wbc 6.5, hb 13.1, plt 258, ua neg  From 2/11 showed   gluc 95,   cr 0.90                                                                                                   wbc 7.0, hb 14.1, plt 308  From 3/12 showed   gluc 103, cr 0.90              alt 43,                                                                           wbc 6.2, hb 13.7, plt 285  From 3/13 showed   gluc 130, cr 0.60,             alt 22,                    chol 244, tg 180, hdl 57, ldl-c 151,                                          ua neg,     tsh 1.79  From 9/13 showed   gluc 119, cr 0.70, gfr>60,            hba1c 6.6, chol 228, tg 145, hdl 62, ldl-c 137, 2hr   From 2/14 showed   gluc 95,   cr 0.60, gfr>61, alt 20, hba1c 5.7,                    umar 2.8  From 4/14 showed   gluc 144, cr 0.98, gfr>60, alt 31,          wbc 7.6, hb 13.4, plt 317,                  ck/trop neg  From 10/14 showed gluc 98,   cr 0.60, gfr>60, alt 20,          chol 163, tg 96,   hdl 64, ldl-c 80  From 4/15 showed   gluc 96,   cr 0.70, gfr>60, alt 19, hba1c 6.1, chol 207, tg 126, hdl 68, ldl-c 114  From 10/15 showed        hba1c 6.3, chol 188, tg 118, hdl 66, ldl-c 98,   wbc 5.3, hb 13.3, plt 287, umar neg  From 4/16 showed   gluc 98,   cr 0.60, gfr>60, alt 19, hba1c 6.3, chol 170, tg 141, hdl 57, ldl-c 85  From 10/16 showed gluc 137, cr 0.50, gfr>60, alt 17, hba1c 6.2, chol 176, tg 302, hdl 54, ldl-c 62,   wbc 8.9, hb 12.3, plt 393, umar neg  From 1/17 showed   gluc 114, cr 0.60, gfr>60,           wbc 6.1, hb 12.9, plt 356  From 4/17 showed        hba1c 6.8, chol 223, tg 240, hdl 53, ldl-c 122  From 9/17 showed   gluc 109, cr 0.70, gfr>60, alt 16, hba1c 6.7,                  umar neg  From 1/18 showed   gluc 104, cr 0.60, gfr>60, alt 14, hba1c 6.8, chol 154, tg 197, hdl 60, ldl-c 55  From 5/18 showed        hba1c 7.0,          wbc 5.7, hb 11.3, plt 281, umar neg, fe 43, %sat 11, ferritin 7,   b12 588, fol 18.6, spep neg, retic 1.6  From 9/18 showed   gluc 143, cr 0.70, gfr>60,    hba1c 7.0, chol 155, tg 199, hdl 56, ldl-c 59  From 1/19 showed                               fe 80, %sat 28, ferritin 28, vit d 37.1  From 5/19 showed   gluc 155, cr 0.70, gfr>60, alt 22, hba1c 7.4, chol 139, tg 172, hdl 49, ldl-c 55,   wbc 6.0, hb 13.3, plt 214, umar neg  From 10/19 showed        hba1c 6.8,                  umar 9.8,  fe 79, %sat 29,           vit d 48.7  From 4/20 showed        hba1c 7.0, chol 142, tg 115, hdl 50, ldl-c 69                  vit d 42.8  From 10/20 showed gluc 85,   cr 0.60, gfr>60, alt 21, hba1c 7.0,          wbc 6.1, hb 13.3, plt 248,              vit d 33.4  From 4/21 showed       hba1c 7.6, chol 163, tg 307, hdl 52, ldl-c 50,    wbc 6.4, hb 14.2, plt 232    Results for orders placed or performed during the hospital encounter of 08/10/21   [facility] SPECIMEN COL   Result Value Ref Range    [facility]. Specimens collected/sent to [facility]. Please direct inquiries to ([phone]). We reviewed the patient's labs from the last several visits to point out trends in the numbers          Patient Active Problem List   Diagnosis Code    Bronchiectasis Dr. Basilio Hernandez J47.9    Osteopenia  M85.80    Hyperlipidemia E78.5    Gastroesophageal reflux disease without esophagitis K21.9    Type II diabetes mellitus, uncontrolled ([facility] [address] E11.65    History of breast cancer Z85.3    Vitamin D deficiency E55.9    Overweight (BMI 25.0-29. 9) E66.3     Assessment and plan:  1. Pulm. F/U Dr Carl Canada and continue current  2. GERD. Continue ppi and avoidance measures  3. Osteopenia. Ca/d, wt bearing exercises  4. DM. Continue prandin and onglyza and doing much better with the diet  5. Lipids. Continue prava, vascepa not covered  6  Breast ca. Per Dr Hang Grimm  7. Overweight. Lifestyle and dietary measures. congratulated her on the wt loss so far, she's aiming for 125lbs  8. Arthralgias.   Prn celebrex        RTC 2/22    Above conditions discussed at length and patient vocalized understanding. All questions answered to patient satisfaction      ICD-10-CM ICD-9-CM    1. Uncontrolled type 2 diabetes mellitus with hyperglycemia (HCC)  E11.65 250.02 MICROALBUMIN, UR, RAND W/ MICROALB/CREAT RATIO      CBC W/O DIFF      METABOLIC PANEL, BASIC      LIPID PANEL      HEMOGLOBIN A1C WITH EAG   2. Bronchiectasis without complication ([facility] [address]  N76.5 494.0    3. History of breast cancer  Z85.3 V10.3    4.  Hyperlipidemia, unspecified hyperlipidemia type  E78.5 272.4

## 2021-08-11 LAB
ALBUMIN SERPL-MCNC: 4.2 G/DL (ref 3.8–4.8)
ALBUMIN/GLOB SERPL: 2.1 {RATIO} (ref 1.2–2.2)
ALP SERPL-CCNC: 44 IU/L (ref 48–121)
ALT SERPL-CCNC: 13 IU/L (ref 0–32)
AST SERPL-CCNC: 15 IU/L (ref 0–40)
BILIRUB SERPL-MCNC: 0.3 MG/DL (ref 0–1.2)
BUN SERPL-MCNC: 15 MG/DL (ref 8–27)
BUN/CREAT SERPL: 25 (ref 12–28)
CALCIUM SERPL-MCNC: 9.2 MG/DL (ref 8.7–10.3)
CHLORIDE SERPL-SCNC: 107 MMOL/L (ref 96–106)
CO2 SERPL-SCNC: 25 MMOL/L (ref 20–29)
CREAT SERPL-MCNC: 0.6 MG/DL (ref 0.57–1)
GLOBULIN SER CALC-MCNC: 2 G/DL (ref 1.5–4.5)
GLUCOSE SERPL-MCNC: 136 MG/DL (ref 65–99)
HBA1C MFR BLD: 6.8 % (ref 4.8–5.6)
POTASSIUM SERPL-SCNC: 4.1 MMOL/L (ref 3.5–5.2)
PROT SERPL-MCNC: 6.2 G/DL (ref 6–8.5)
SODIUM SERPL-SCNC: 142 MMOL/L (ref 134–144)
SPECIMEN STATUS REPORT, ROLRST: NORMAL

## 2021-08-13 ENCOUNTER — OFFICE VISIT (OUTPATIENT)
Dept: INTERNAL MEDICINE CLINIC | Age: 71
End: 2021-08-13
Payer: COMMERCIAL

## 2021-08-13 VITALS
TEMPERATURE: 98.1 F | BODY MASS INDEX: 22.86 KG/M2 | SYSTOLIC BLOOD PRESSURE: 128 MMHG | HEIGHT: 63 IN | RESPIRATION RATE: 12 BRPM | OXYGEN SATURATION: 96 % | WEIGHT: 129 LBS | DIASTOLIC BLOOD PRESSURE: 71 MMHG | HEART RATE: 102 BPM

## 2021-08-13 DIAGNOSIS — J47.9 BRONCHIECTASIS WITHOUT COMPLICATION (HCC): ICD-10-CM

## 2021-08-13 DIAGNOSIS — E78.5 HYPERLIPIDEMIA, UNSPECIFIED HYPERLIPIDEMIA TYPE: ICD-10-CM

## 2021-08-13 DIAGNOSIS — Z85.3 HISTORY OF BREAST CANCER: ICD-10-CM

## 2021-08-13 DIAGNOSIS — E11.65 UNCONTROLLED TYPE 2 DIABETES MELLITUS WITH HYPERGLYCEMIA (HCC): Primary | ICD-10-CM

## 2021-08-13 PROCEDURE — 99214 OFFICE O/P EST MOD 30 MIN: CPT | Performed by: INTERNAL MEDICINE

## 2021-08-19 DIAGNOSIS — G89.29 CHRONIC LEFT SI JOINT PAIN: ICD-10-CM

## 2021-08-19 DIAGNOSIS — M25.552 LEFT HIP PAIN: ICD-10-CM

## 2021-08-19 DIAGNOSIS — M53.3 CHRONIC LEFT SI JOINT PAIN: ICD-10-CM

## 2021-08-19 RX ORDER — CELECOXIB 100 MG/1
100 CAPSULE ORAL 2 TIMES DAILY
Qty: 60 CAPSULE | Refills: 1 | Status: SHIPPED | OUTPATIENT
Start: 2021-08-19 | End: 2021-10-20

## 2021-09-09 DIAGNOSIS — F41.9 ANXIETY: ICD-10-CM

## 2021-09-09 RX ORDER — DIAZEPAM 5 MG/1
5 TABLET ORAL
Qty: 40 TABLET | Refills: 1 | Status: SHIPPED | OUTPATIENT
Start: 2021-09-09 | End: 2021-11-15 | Stop reason: SDUPTHER

## 2021-09-09 NOTE — TELEPHONE ENCOUNTER
[facility]  reports [facility] last [facility] date for Valium as 8/7/21 for a 20 d/s. Last Visit: 8/13/21 with MD Fan Martinez  Next Appointment: 2/14/22 with MD Fan Martinez  Previous Refill Encounter(s): 7/8/21 #40 with 1 refill    Requested Prescriptions     Pending Prescriptions Disp Refills    diazePAM (VALIUM) 5 mg tablet 40 Tablet 1     Sig: Take 1 Tablet by mouth every twelve (12) hours as needed for Anxiety. Max Daily Amount: 10 mg.

## 2021-09-27 ENCOUNTER — HOSPITAL ENCOUNTER (OUTPATIENT)
Dept: BONE DENSITY | Age: 71
Discharge: HOME | End: 2021-09-27
Attending: INTERNAL MEDICINE
Payer: COMMERCIAL

## 2021-09-27 PROCEDURE — 77080 DXA BONE DENSITY AXIAL: CPT

## 2021-09-28 ENCOUNTER — TELEPHONE (OUTPATIENT)
Dept: INTERNAL MEDICINE CLINIC | Age: 71
End: 2021-09-28

## 2021-09-28 NOTE — TELEPHONE ENCOUNTER
Pt says last time she was here you gave her Celebrex. It is not working for her. You told her if it didn't work you could send her to someone for Cortizone injections. She would like to try that. Can you please place a referral to the doctor you would recommend?

## 2021-09-29 NOTE — TELEPHONE ENCOUNTER
Spoke with patient, she stated that she felt completley normal last night, but woke up this morning and her head \"felt like it was 100 pounds\" and everything was spinning, she took her BP and it was 138/78 and her BS was 162. She cannot walk in a straight line and needed her 's assistance to get to the restroom. She was advised to go to the ER to be evaluated and to call the office back after her visit to set up a follow up appointment, patient verbalized understanding.

## 2021-10-01 LAB — MAMMOGRAPHY, EXTERNAL: NORMAL

## 2021-10-07 ENCOUNTER — TELEPHONE (OUTPATIENT)
Dept: INTERNAL MEDICINE CLINIC | Age: 71
End: 2021-10-07

## 2021-10-07 DIAGNOSIS — M25.559 HIP PAIN: Primary | ICD-10-CM

## 2021-10-07 NOTE — TELEPHONE ENCOUNTER
Pt states she was supposed to be referred to someone regarding her left hip pain. I don't show any referral in system.   Please advise her at [phone]

## 2021-10-08 ENCOUNTER — TELEPHONE (OUTPATIENT)
Dept: INTERNAL MEDICINE CLINIC | Age: 71
End: 2021-10-08

## 2021-10-19 DIAGNOSIS — G89.29 CHRONIC LEFT SI JOINT PAIN: ICD-10-CM

## 2021-10-19 DIAGNOSIS — M25.552 LEFT HIP PAIN: ICD-10-CM

## 2021-10-19 DIAGNOSIS — M53.3 CHRONIC LEFT SI JOINT PAIN: ICD-10-CM

## 2021-10-20 RX ORDER — CELECOXIB 100 MG/1
100 CAPSULE ORAL 2 TIMES DAILY
Qty: 60 CAPSULE | Refills: 1 | Status: SHIPPED | OUTPATIENT
Start: 2021-10-20 | End: 2021-12-19

## 2021-11-02 RX ORDER — DEXLANSOPRAZOLE 60 MG/1
1 CAPSULE, DELAYED RELEASE ORAL DAILY
Qty: 90 CAPSULE | Refills: 3 | Status: CANCELLED | OUTPATIENT
Start: 2021-11-02

## 2021-11-05 ENCOUNTER — OFFICE VISIT (OUTPATIENT)
Dept: ORTHOPEDIC SURGERY | Age: 71
End: 2021-11-05
Payer: COMMERCIAL

## 2021-11-05 VITALS — TEMPERATURE: 97.1 F | BODY MASS INDEX: 22.32 KG/M2 | WEIGHT: 126 LBS | HEIGHT: 63 IN

## 2021-11-05 DIAGNOSIS — R20.0 NUMBNESS OF RIGHT FOOT: ICD-10-CM

## 2021-11-05 DIAGNOSIS — Z85.3 HISTORY OF BREAST CANCER: ICD-10-CM

## 2021-11-05 DIAGNOSIS — M25.552 LEFT HIP PAIN: Primary | ICD-10-CM

## 2021-11-05 DIAGNOSIS — M70.62 GREATER TROCHANTERIC BURSITIS OF LEFT HIP: ICD-10-CM

## 2021-11-05 PROCEDURE — 99204 OFFICE O/P NEW MOD 45 MIN: CPT | Performed by: ORTHOPAEDIC SURGERY

## 2021-11-05 PROCEDURE — 73502 X-RAY EXAM HIP UNI 2-3 VIEWS: CPT | Performed by: ORTHOPAEDIC SURGERY

## 2021-11-05 PROCEDURE — 20610 DRAIN/INJ JOINT/BURSA W/O US: CPT | Performed by: ORTHOPAEDIC SURGERY

## 2021-11-05 RX ORDER — BETAMETHASONE SODIUM PHOSPHATE AND BETAMETHASONE ACETATE 3; 3 MG/ML; MG/ML
6 INJECTION, SUSPENSION INTRA-ARTICULAR; INTRALESIONAL; INTRAMUSCULAR; SOFT TISSUE ONCE
Status: COMPLETED | OUTPATIENT
Start: 2021-11-05 | End: 2021-11-05

## 2021-11-05 RX ADMIN — BETAMETHASONE SODIUM PHOSPHATE AND BETAMETHASONE ACETATE 6 MG: 3; 3 INJECTION, SUSPENSION INTRA-ARTICULAR; INTRALESIONAL; INTRAMUSCULAR; SOFT TISSUE at 08:48

## 2021-11-05 NOTE — PROGRESS NOTES
Patient: Rosa Jacob                MRN: [id number]       SSN: [id number]  YOB: 1950        AGE: 73 y.o. SEX: female  Body mass index is 22.32 kg/m². PCP: August Conroy MD  11/05/21    Robe Salgado presents today with left-sided hip pain she does get occasional groin discomfort not too much problems putting shoes and socks on about a 4 to 5-month history of increasing left hip pain really hurts her to roll over on it at night as well she denies fevers or chills and otherwise has been feeling well she has a history of breast cancer with surgery and radiation no recurrence about 4 years ago no fevers chills night sweats or weight loss examination today she has a mildly antalgic and mildly antalgic Trendelenburg gait owing to the left hip the back is not particularly tender knees are noncontributory sensory is normal including L4-5 S1 tib and EHL 5 out of 5 straight leg raise is negative and the left hip itself is quite tender over the trochanter with flexion adduction and internal rotation to about 15 degrees does reproduce some groin discomfort for her she looks well otherwise and she appears younger than her stated age    X-rays today 11/5/2021 AP pelvis AP and lateral left hip she has some cystic activity in the femoral head only minimal arthritis I like to get an MRI I would like like to rule out avascular necrosis she has a history of CVA as well.     Overall impression is severe bursitis of the left hip with some features in the femoral head during necessitating an MRI evaluation and therefore left hip injected as per protocol for the bursitis we will see her back after the left hip MRI hopefully will be negative it has been a pleasure to share in her care    REVIEW OF SYSTEMS:      CON: negative  EYE: negative   ENT: negative  RESP: negative  GI:    negative   :  negative  MSK: Positive  A twelve point review of systems was completed, positives noted and all other systems were reviewed and are negative          Past Medical History:   Diagnosis Date    ADD (attention deficit disorder) 2014    [facility] psych    Amebic colitis 1970s    Anemia, iron deficiency 05/2018    eval Dr Fritz Preciado showed gastritis    [email or web address] 02/2016    Dr Abdullahi Cunha; RIGHT partial mastectomy, adjuvant XRT    Bronchiectasis     Dr. Debbie Tolliver; RLL    COVID-19 virus infection 12/2020    Diabetes mellitus ([email or web address] 09/2013    on basis of elev a1c    Fibrocystic breast disease     Dr. Kg Shaffer GERD (gastroesophageal reflux disease)     Dr. Fritz Preciado s/p dilation 2/12    H/O pulmonary function tests 2011    ratio 70, FEV1 81 no change postbd, TLC 93, RV 94, DLCO 81    Hyperlipidemia     Hypovitaminosis D     Insomnia     Osteoarthritis     knee s/p cortisone; SI joint on xray 7/21    Osteopenia     DEXA t-score -1.2 spine, -0.2 hip (4/08);  spine -1.2, hip 0.1 (10/10);  -1.8 spine, -0.3 FRAX 6.3/0.2 (7/13); [facility] -1.7 spine, 0.1 hip (4/17); -1.7 spine, 0.2 hip (9/21)    Overweight (BMI 25.0-29. 9)     IF 5/18 but unwilling to do; phentermine 1/19 start weight 145 lbs    Rotator cuff injury 2000s    conservative therapy LEFT    Sinus tachycardia 2018    Dr Jenniffer Marcelino    Spontaneous pneumothorax 1970s    x3    Zoster right S1 3/12       Family History   Problem Relation Age of Onset    Breast Cancer Mother     Ovarian Cancer Mother     Diabetes Father     Stroke Father     Diabetes Sister     Breast Cancer Maternal Grandmother     Breast Cancer Paternal Grandmother     Breast Cancer Paternal Aunt        Current Outpatient Medications   Medication Sig Dispense Refill    diazePAM (VALIUM) 5 mg tablet Take 1 Tablet by mouth every twelve (12) hours as needed for Anxiety. Max Daily Amount: 10 mg. 40 Tablet 1    sAXagliptin (ONGLYZA) 5 mg tab tablet Take 1 Tablet by mouth daily.  30 Tablet 11    Dexilant 60 mg CpDB capsule (delayed release) TAKE 1 CAPSULE BY MOUTH EVERY DAY 90 Capsule 3    cyanocobalamin 1,000 mcg tablet Take 1,000 mcg by mouth daily.  vitamin e (E GEMS) 100 unit capsule Take 100 Units by mouth daily.  albuterol (Ventolin HFA) 90 mcg/actuation inhaler Take 2 Puffs by inhalation every six (6) hours as needed for Wheezing. Indications: bronchospasm 1 Inhaler 3    ergocalciferol (ERGOCALCIFEROL) 1,250 mcg (50,000 unit) capsule TAKE 1 CAPSULE BY MOUTH 1 TIME PER WEEK 12 Cap 4    pravastatin (PRAVACHOL) 40 mg tablet TAKE 1 TABLET BY MOUTH NIGHTLY. 90 Tab 3    zinc sulfate (ZINC-15 PO) Take  by mouth.  ferrous sulfate (Iron) 325 mg (65 mg iron) tablet Take  by mouth Daily (before breakfast).  calcium carbonate (CALCIUM 300 PO) Take  by mouth.  ADVAIR DISKUS 250-50 mcg/dose diskus inhaler TAKE 1 PUFF BY INHALATION TWO (2) TIMES A DAY. (Patient taking differently: TAKE 1 PUFF BY INHALATION as needed) 1 Inhaler 5    tamoxifen (NOLVADEX) 10 mg tablet Take  by mouth daily.  b complex vitamins (B COMPLEX 1) tablet Take 1 Tab by mouth daily.  celecoxib (CELEBREX) 100 mg capsule TAKE 1 CAPSULE BY MOUTH TWO (2) TIMES A DAY FOR 60 DAYS. (Patient not taking: Reported on 11/5/2021) 60 Capsule 1    repaglinide (PRANDIN) 0.5 mg tablet Take 1 Tab by mouth Before breakfast, lunch, and dinner.  (Patient not taking: Reported on 8/13/2021) 90 Tab 11     Current Facility-Administered Medications   Medication Dose Route Frequency Provider Last Rate Last Admin    betamethasone (CELESTONE) injection 6 mg  6 mg IntraBURSal ONCE Alayna Couch MD           Allergies   Allergen Reactions    Actos [Pioglitazone] Other (comments)     Felt bad, weight gain    Avelox [Moxifloxacin] Other (comments)     Felt jittery    Crestor [Rosuvastatin] Myalgia    Evista [Raloxifene] Other (comments)     Severe flashes    Jardiance [Empagliflozin] Other (comments)     Recurring yeast infections    Lipitor [Atorvastatin] Other (comments)     Leg cramps and muscle aches    Macrobid [Nitrofurantoin Monohyd/M-Cryst] Hives    Metformin Other (comments)     Gi intolerance    Neuromuscular Blockers, Steroidal Other (comments)     \"patient feels crazy\"    Nsaids (Non-Steroidal Anti-Inflammatory Drug) Other (comments)     H/o gastropathy    Phentermine Other (comments)     Felt bad       Past Surgical History:   Procedure Laterality Date    HX BREAST BIOPSY      RIGHT breast biopsy (); RIGHT breast lumpectomy ()    HX COLONOSCOPY      Dr. Corin Santizo negative ; diverticulosis  Dr Jm Leslie; Dr Corin Santizo 17 neg    HX ENDOSCOPY  2018    Dr Corin Santizo; gastropathy, h pylori neg    HX HYSTERECTOMY      fibroids/ovarian cysts - Dr. 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     Employer: [employer]   Tobacco Use    Smoking status: Former Smoker     Packs/day: 1.00     Years: 5.00     Pack years: 5.00     Quit date: 1973     Years since quittin.2    Smokeless tobacco: Never Used   Substance and Sexual Activity    Alcohol use: No    Drug use: No    Sexual activity: Not on file   Other Topics Concern    Not on file   Social History Narrative    Not on file     Social Determinants of Health     Financial Resource Strain:     Difficulty of Paying Living Expenses: Not on file   Food Insecurity:     Worried About [address] in the Last Year: Not on file    [address] in the Last Year: Not on file   Transportation Needs:     Lack of Transportation (Medical): Not on file    Lack of Transportation (Non-Medical): Not on file   Physical Activity:     Days of Exercise per Week: Not on file    Minutes of Exercise per Session: Not on file   Stress:     Feeling of Stress : Not on file   Social Connections:     Frequency of Communication with Friends and Family: Not on file    Frequency of Social Gatherings with Friends and Family: Not on file    Attends Confucianism Services: Not on file    Active Member of [address] [place] [address] [employer] or Organizations: Not on file    Attends Club or Organization Meetings: Not on file    Marital Status: Not on file   Intimate Partner Violence:     Fear of Current or Ex-Partner: Not on file    Emotionally Abused: Not on file    Physically Abused: Not on file    Sexually Abused: Not on file   Housing Stability:     Unable to Pay for Housing in the Last Year: Not on file    Number of [place] in the Last Year: Not on file    Unstable Housing in the Last Year: Not on file       Visit Vitals  Temp 97.1 °F (36.2 °C)   Ht 5' 3\" (1.6 m)   Wt 126 lb (57.2 kg)   BMI 22.32 kg/m²         PHYSICAL EXAMINATION:  GENERAL: Alert and oriented x3, in no acute distress, well-developed, well-nourished, afebrile. HEART: No JVD. EYES: No scleral icterus   NECK: No significant lymphadenopathy   LUNGS: No respiratory compromise or indrawing  ABDOMEN: Soft, non-tender, non-distended. Note: This note was completed using voice recognition software. Any typographical/name errors or mistakes are unintentional.    Electronically signed by: MD NAA Baptiste Pascal Latin Pamalee Covert, M.D., have reviewed the history, physical, and have updated the allergic reactions for [facility].     TIME OUT performed immediately prior to the start of procedure:  Coreen JACOME M.D., have performed the following reviews on [facility] prior to the start of the procedure:    - Patient was identified by name and date of birth  - Agreement on procedure being performed was verified  - Risks and benefits explained to the patient  - Patient was positioned for comfort  - Consent was signed and verified  - Patient was advised regarding risks of bruising, bleeding, infection and pain    Time: 8:45 AM     Body Part: intra-bursal injection of left hip. Medication and Dose: 1mL Celestone preparation, i.e. 6 mg, and 3 mL 1% lidocaine    Date of Procedure: 11/05/21    PROCEDURE PERFORMED BY : Maria Ines Loza M.D., [facility])    Provider Assisted by: Huseyin Goss    Patient assisted by: self    Patient tolerated procedure well with no complications

## 2021-11-09 DIAGNOSIS — E78.5 HYPERLIPIDEMIA, UNSPECIFIED HYPERLIPIDEMIA TYPE: ICD-10-CM

## 2021-11-10 RX ORDER — PRAVASTATIN SODIUM 40 MG/1
TABLET ORAL
Qty: 90 TABLET | Refills: 3 | Status: SHIPPED | OUTPATIENT
Start: 2021-11-10

## 2021-11-14 ENCOUNTER — HOSPITAL ENCOUNTER (OUTPATIENT)
Age: 71
Discharge: HOME | End: 2021-11-14
Attending: ORTHOPAEDIC SURGERY
Payer: COMMERCIAL

## 2021-11-14 DIAGNOSIS — Z85.3 HISTORY OF BREAST CANCER: ICD-10-CM

## 2021-11-14 DIAGNOSIS — M25.552 LEFT HIP PAIN: ICD-10-CM

## 2021-11-14 PROCEDURE — 73721 MRI JNT OF LWR EXTRE W/O DYE: CPT

## 2021-11-15 DIAGNOSIS — F41.9 ANXIETY: ICD-10-CM

## 2021-11-15 RX ORDER — DEXLANSOPRAZOLE 60 MG/1
1 CAPSULE, DELAYED RELEASE ORAL DAILY
Qty: 90 CAPSULE | Refills: 3 | Status: CANCELLED | OUTPATIENT
Start: 2021-11-15

## 2021-11-15 RX ORDER — DIAZEPAM 5 MG/1
5 TABLET ORAL
Qty: 40 TABLET | Refills: 1 | Status: SHIPPED | OUTPATIENT
Start: 2021-11-15 | End: 2022-01-14

## 2021-11-15 NOTE — TELEPHONE ENCOUNTER
Ellie Dennison was sent on 5/30/21 #90 with 3 refills    [facility] reports the last fill date for Valium as 10/12/21 for a 20 d/s. Last Visit: 8/13/21 with MD Up Running  Next Appointment: 2/14/22 with MD Up Running  Previous Refill Encounter(s): 9/9/21 #40 with 1 refill    Requested Prescriptions     Pending Prescriptions Disp Refills    diazePAM (VALIUM) 5 mg tablet 40 Tablet 1     Sig: Take 1 Tablet by mouth every twelve (12) hours as needed for Anxiety. Max Daily Amount: 10 mg.

## 2022-01-03 ENCOUNTER — OFFICE VISIT (OUTPATIENT)
Dept: INTERNAL MEDICINE CLINIC | Age: 72
End: 2022-01-03
Payer: COMMERCIAL

## 2022-01-03 VITALS
OXYGEN SATURATION: 97 % | HEIGHT: 63 IN | WEIGHT: 126.8 LBS | RESPIRATION RATE: 18 BRPM | TEMPERATURE: 97.5 F | DIASTOLIC BLOOD PRESSURE: 58 MMHG | BODY MASS INDEX: 22.47 KG/M2 | HEART RATE: 101 BPM | SYSTOLIC BLOOD PRESSURE: 134 MMHG

## 2022-01-03 DIAGNOSIS — G43.909 MIGRAINE WITHOUT STATUS MIGRAINOSUS, NOT INTRACTABLE, UNSPECIFIED MIGRAINE TYPE: ICD-10-CM

## 2022-01-03 DIAGNOSIS — M54.32 LEFT SIDED SCIATICA: Primary | ICD-10-CM

## 2022-01-03 PROCEDURE — 99214 OFFICE O/P EST MOD 30 MIN: CPT | Performed by: INTERNAL MEDICINE

## 2022-01-03 RX ORDER — PREDNISONE 10 MG/1
TABLET ORAL
Qty: 21 TABLET | Refills: 0 | Status: SHIPPED | OUTPATIENT
Start: 2022-01-03 | End: 2022-02-14 | Stop reason: ALTCHOICE

## 2022-01-03 NOTE — PROGRESS NOTES
Tiffany Arrington presents today for   Chief Complaint   Patient presents with    Back Pain     left leg       Is someone accompanying this pt? no    Is the patient using any DME equipment during OV? no    Depression Screening:  3 most recent PHQ Screens 1/3/2022   Little interest or pleasure in doing things Not at all   Feeling down, depressed, irritable, or hopeless Not at all   Total Score PHQ 2 0       Learning Assessment:  Learning Assessment 5/31/2019   PRIMARY LEARNER Patient   HIGHEST LEVEL OF EDUCATION - PRIMARY LEARNER  > 4 YEARS [place] PRIMARY LEARNER NONE   CO-LEARNER CAREGIVER No   PRIMARY LANGUAGE ENGLISH    NEED -   LEARNER PREFERENCE PRIMARY READING     -   ANSWERED BY PATIENT   RELATIONSHIP SELF       Abuse Screening:  Abuse Screening Questionnaire 1/3/2022   Do you ever feel afraid of your partner? N   Are you in a relationship with someone who physically or mentally threatens you? N   Is it safe for you to go home? Y       Fall Risk  Fall Risk Assessment, last 12 mths 1/3/2022   Able to walk? Yes   Fall in past 12 months? 0   Do you feel unsteady? 0   Are you worried about falling 0   Number of falls in past 12 months -   Fall with injury? -       ADL  No flowsheet data found. Health Maintenance reviewed and discussed and ordered per Provider. Health Maintenance Due   Topic Date Due    Foot Exam Q1  05/31/2020    COVID-19 Vaccine (2 - Booster for Nereyda series) 08/07/2021    Flu Vaccine (1) 09/01/2021    MICROALBUMIN Q1  10/02/2021   . Coordination of Care:  1. Have you been to the ER, urgent care clinic since your last visit? Hospitalized since your last visit? no    2. Have you seen or consulted any other health care providers outside of the [address] [place] [address] since your last visit? Include any pap smears or colon screening. no    3. For patients aged 39-70: Has the patient had a colonoscopy?  Yes, HM satisfied with blue hyperlink     If the patient is female:    4. For patients aged 41-77: Has the patient had a mammogram within the past 2 years? Yes, HM satisfied with blue hyperlink    5. For patients aged 21-65: Has the patient had a pap smear?  NA based on age or sex

## 2022-01-03 NOTE — PROGRESS NOTES
Dallas Glynn presents with No chief complaint on file. Back/Leg tingling      1. \"Have you been to the ER, urgent care clinic since your last visit? Hospitalized since your last visit? \" ***    2. \"Have you seen or consulted any other health care providers outside of the [address] [place] [address] since your last visit? \" ***    3. For patients aged 39-70: Has the patient had a colonoscopy? {yes when and where or no:56748}     If the patient is female:    4. For patients aged 41-77: Has the patient had a mammogram within the past 2 years? {yes when and where or no:63580}    5. For patients aged 21-65: Has the patient had a pap smear?  {yes when and where or no:12820}

## 2022-01-03 NOTE — PROGRESS NOTES
73 y.o. WHITE/NON- female who presents for evaluation. Dr. Cortez Childers has been treating her for left hip problem which turned out to be bursitis. She had MRI, cortisone shot seemed to help relieve most of the symptoms. She is here mainly for complaints of \"sciatica\". She has been having low back pain with radiation down into her left leg all the way to the foot. The left foot \"feels abnormal\" with a numbness sensation. Denies any weakness, saddle complaints, incontinence. No known history of L-spine disease, she does not recall any recent trauma, injury, falls. She tried low-dose Motrin and Tylenol without much success    Secondly, she think she is having migraines. There is a remote history of migraines apparently which she describes as classic migraine. For the last 5 days, she has had bilateral retro-orbital sharp pain with associated flickering lights sometimes has an aura but sometimes with the headache itself. She admits to some queasiness although no vomiting, avoiding lights helps. No focal neurologic complaints.   Tylenol an/or Motrin have not helped either    Past Medical History:   Diagnosis Date    ADD (attention deficit disorder) 2014    [facility] psych    Amebic colitis 1970s    Anemia, iron deficiency 05/2018    eval Dr Esperanza Arzate showed gastritis    Breast cancer ([facility] 02/2016    Dr Rachel Morris; RIGHT partial mastectomy, adjuvant XRT    Bronchiectasis     Dr. Dorian Huntley; RLL    COVID-19 virus infection 12/2020    Diabetes mellitus ([facility] 09/2013    on basis of elev a1c    Fibrocystic breast disease     Dr. Vel Knapp GERD (gastroesophageal reflux disease)     Dr. Esperanza Arzate s/p dilation 2/12    H/O pulmonary function tests 2011    ratio 70, FEV1 81 no change postbd, TLC 93, RV 94, DLCO 81    Hyperlipidemia     Hypovitaminosis D     Insomnia     Osteoarthritis     knee s/p cortisone; SI joint on xray 7/21    Osteopenia     DEXA t-score -1.2 spine, -0.2 hip (4/08);  spine -1.2, hip 0.1 (10/10);  -1.8 spine, -0.3 FRAX 6.3/0.2 (); [facility] -1.7 spine, 0.1 hip (); -1.7 spine, 0.2 hip ()    Overweight (BMI 25.0-29. 9)     IF  but unwilling to do; phentermine  start weight 145 lbs    Rotator cuff injury     conservative therapy LEFT    Sinus tachycardia     Dr Walt Mosqueda Spontaneous pneumothorax 1970s    x3    Zoster right S1 3/12     Past Surgical History:   Procedure Laterality Date    HX BREAST BIOPSY      RIGHT breast biopsy (); RIGHT breast lumpectomy ()    HX COLONOSCOPY      Dr. Charmayne Song negative ; diverticulosis  Dr Joann Fuller; Dr Charmayne Song 17 neg    HX ENDOSCOPY  2018    Dr Charmayne Song; gastropathy, h pylori neg    HX HYSTERECTOMY      fibroids/ovarian cysts - Dr. 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     Employer: [employer]   Tobacco Use    Smoking status: Former Smoker     Packs/day: 1.00     Years: 5.00     Pack years: 5.00     Quit date: 1973     Years since quittin.4    Smokeless tobacco: Never Used   Substance and Sexual Activity    Alcohol use: No    Drug use: No    Sexual activity: Not on file   Other Topics Concern    Not on file   Social History Narrative    Not on file     Social Determinants of Health     Financial Resource Strain:     Difficulty of Paying Living Expenses: Not on file   Food Insecurity:     Worried About Running Out of Food in the Last Year: Not on file    Ran Out of Food in the Last Year: Not on file   Transportation Needs:     Lack of Transportation (Medical): Not on file    Lack of Transportation (Non-Medical): Not on file   Physical Activity:     Days of Exercise per Week: Not on file    Minutes of Exercise per Session: Not on file   Stress:     Feeling of Stress : Not on file   Social Connections:     Frequency of Communication with Friends and Family: Not on file    Frequency of Social Gatherings with Friends and Family: Not on file    Attends [employer] Services: Not on file    Active Member of [address] [place] [address] [employer] or Organizations: Not on file    Attends Club or Organization Meetings: Not on file    Marital Status: Not on file   Intimate Partner Violence:     Fear of Current or Ex-Partner: Not on file    Emotionally Abused: Not on file    Physically Abused: Not on file    Sexually Abused: Not on file   Housing Stability:     Unable to Pay for Housing in the Last Year: Not on file    Number of [place] in the Last Year: Not on file    Unstable Housing in the Last Year: Not on file     Current Outpatient Medications   Medication Sig    predniSONE (STERAPRED DS) 10 mg dose pack See administration instruction per 10mg dose pack    diazePAM (VALIUM) 5 mg tablet Take 1 Tablet by mouth every twelve (12) hours as needed for Anxiety. Max Daily Amount: 10 mg.  pravastatin (PRAVACHOL) 40 mg tablet TAKE 1 TABLET BY MOUTH NIGHTLY.  sAXagliptin (ONGLYZA) 5 mg tab tablet Take 1 Tablet by mouth daily.  Dexilant 60 mg CpDB capsule (delayed release) TAKE 1 CAPSULE BY MOUTH EVERY DAY    cyanocobalamin 1,000 mcg tablet Take 1,000 mcg by mouth daily.  vitamin e (E GEMS) 100 unit capsule Take 100 Units by mouth daily.  repaglinide (PRANDIN) 0.5 mg tablet Take 1 Tab by mouth Before breakfast, lunch, and dinner.     albuterol (Ventolin HFA) 90 mcg/actuation inhaler Take 2 Puffs by inhalation every six (6) hours as needed for Wheezing. Indications: bronchospasm    ergocalciferol (ERGOCALCIFEROL) 1,250 mcg (50,000 unit) capsule TAKE 1 CAPSULE BY MOUTH 1 TIME PER WEEK    zinc sulfate (ZINC-15 PO) Take  by mouth.  ferrous sulfate (Iron) 325 mg (65 mg iron) tablet Take  by mouth Daily (before breakfast).  calcium carbonate (CALCIUM 300 PO) Take  by mouth.  ADVAIR DISKUS 250-50 mcg/dose diskus inhaler TAKE 1 PUFF BY INHALATION TWO (2) TIMES A DAY. (Patient taking differently: TAKE 1 PUFF BY INHALATION as needed)    tamoxifen (NOLVADEX) 10 mg tablet Take  by mouth daily.  b complex vitamins (B COMPLEX 1) tablet Take 1 Tab by mouth daily. No current facility-administered medications for this visit. Allergies   Allergen Reactions    Actos [Pioglitazone] Other (comments)     Felt bad, weight gain    Avelox [Moxifloxacin] Other (comments)     Felt jittery    Crestor [Rosuvastatin] Myalgia    Evista [Raloxifene] Other (comments)     Severe flashes    Jardiance [Empagliflozin] Other (comments)     Recurring yeast infections    Lipitor [Atorvastatin] Other (comments)     Leg cramps and muscle aches    Macrobid [Nitrofurantoin Monohyd/M-Cryst] Hives    Metformin Other (comments)     Gi intolerance    Neuromuscular Blockers, Steroidal Other (comments)     \"patient feels crazy\"    Nsaids (Non-Steroidal Anti-Inflammatory Drug) Other (comments)     H/o gastropathy    Phentermine Other (comments)     Felt bad     Visit Vitals  BP (!) 134/58   Pulse (!) 101   Temp 97.5 °F (36.4 °C) (Temporal)   Resp 18   Ht 5' 3\" (1.6 m)   Wt 126 lb 12.8 oz (57.5 kg)   SpO2 97%   BMI 22.46 kg/m²   Discs sharp, sinuses nontender, no temporal area or tmj tenderness. Neck supple full range of motion. Lungs are clear. Heart showed regular rate rhythm. Abdomen soft nontender, no hepatosplenomegaly. Positive straight leg on the left, negative on the right. No pain on rotation of the hips or palpation of trochanters. Strength, sensation, reflexes intact in both lower extremities. Assessment and plan:  1. Sciatica. Quick discussion, hold off on imaging. I gave her prednisone taper which she will reluctantly take as \"it made her crazy when she was younger\". Call  w update  2. Possible migraines? Suggested Excedrin for now, she is taking prednisone as above as well. Might consider Imitrex in the future. 3.  Diabetes. Watch sugars closely with the prednisone above, call if excessively high numbers        Above conditions discussed at length and patient vocalized understanding.   All questions answered to patient satisfaction

## 2022-01-04 ENCOUNTER — TELEPHONE (OUTPATIENT)
Dept: INTERNAL MEDICINE CLINIC | Age: 72
End: 2022-01-04

## 2022-01-04 DIAGNOSIS — R42 DIZZINESS: Primary | ICD-10-CM

## 2022-01-04 RX ORDER — MECLIZINE HYDROCHLORIDE 25 MG/1
25 TABLET ORAL
Qty: 30 TABLET | Refills: 0 | Status: SHIPPED | OUTPATIENT
Start: 2022-01-04

## 2022-01-04 NOTE — TELEPHONE ENCOUNTER
Pt was seen yesterday  And prescribed prednisone. as of today she said her dizziness has gotten worse Pt said she hasn't started prednisone yet so she know dizzziness is not from that medication  she is asking if Dr QUARLES could call something into her pharmacy to help with the dizziness .

## 2022-01-13 DIAGNOSIS — F41.9 ANXIETY: ICD-10-CM

## 2022-01-13 DIAGNOSIS — E55.9 VITAMIN D DEFICIENCY: ICD-10-CM

## 2022-01-14 RX ORDER — ERGOCALCIFEROL 1.25 MG/1
CAPSULE ORAL
Qty: 12 CAPSULE | Refills: 4 | Status: SHIPPED | OUTPATIENT
Start: 2022-01-14

## 2022-01-14 RX ORDER — DIAZEPAM 5 MG/1
TABLET ORAL
Qty: 40 TABLET | Refills: 1 | Status: SHIPPED | OUTPATIENT
Start: 2022-01-14 | End: 2022-03-14 | Stop reason: SDUPTHER

## 2022-01-28 ENCOUNTER — OFFICE VISIT (OUTPATIENT)
Dept: ORTHOPEDIC SURGERY | Age: 72
End: 2022-01-28
Payer: COMMERCIAL

## 2022-01-28 VITALS
HEART RATE: 107 BPM | WEIGHT: 129.2 LBS | BODY MASS INDEX: 22.89 KG/M2 | HEIGHT: 63 IN | OXYGEN SATURATION: 98 % | TEMPERATURE: 97.3 F

## 2022-01-28 DIAGNOSIS — M67.80 TENDONOSIS: ICD-10-CM

## 2022-01-28 DIAGNOSIS — M16.12 PRIMARY OSTEOARTHRITIS OF LEFT HIP: Primary | ICD-10-CM

## 2022-01-28 DIAGNOSIS — M70.62 GREATER TROCHANTERIC BURSITIS OF LEFT HIP: ICD-10-CM

## 2022-01-28 PROCEDURE — 99214 OFFICE O/P EST MOD 30 MIN: CPT | Performed by: ORTHOPAEDIC SURGERY

## 2022-01-28 RX ORDER — CLOTRIMAZOLE AND BETAMETHASONE DIPROPIONATE 10; .64 MG/G; MG/G
CREAM TOPICAL
COMMUNITY
End: 2022-09-02 | Stop reason: SDUPTHER

## 2022-01-28 NOTE — PROGRESS NOTES
Patient: Rober Arceo                MRN: [id number]       SSN: [id number]  YOB: 1950        AGE: 73 y.o. SEX: female  Body mass index is 22.89 kg/m². PCP: Damion Leal MD  01/28/22    Mrs. Jermain Mcghee notes for reevaluation of left-sided hip pain she is feeling a lot better still some discomfort when she first stands up or at the end of the day but significantly improved its laterally based nonradicular no fevers or chills no radicular syndrome her symptoms otherwise been feeling well good appetite the injection took about 3 weeks for it to really get the full effect not much in the way of groin discomfort the exam today she is walking well unassisted healthy slim the range of motion is fairly well-preserved still some mild tenderness involving the lateral trochanteric area but much improved from previous low backs not particularly tender    Both feet warm well perfused no foot drop straight leg raise is negative I did review her x-rays from previous showing just mild degenerative changes and the review of the MRI with the patient today looking at films and interpretation confirm tendinosis and also some bursitis no sinister features and I did also gently recommend some therapy she does have some Voltaren cream and.     We did discuss surgery was decided that its not recommended we should manage this nonoperatively for her she is very pleased about that and that we can certainly do some therapy in the future for her or repeat injection as needed's been a pleasure to share in her care she is welcome to return at any point    REVIEW OF SYSTEMS:      CON: negative  EYE: negative   ENT: negative  RESP: negative  GI:    negative   :  negative  MSK: Positive  A twelve point review of systems was completed, positives noted and all other systems were reviewed and are negative          Past Medical History:   Diagnosis Date    ADD (attention deficit disorder) 2014    [facility]    Amebic colitis 1970s    Anemia, iron deficiency 05/2018    eval Dr Milagro Callahan showed gastritis    [facility]2016    Dr Alexx Hoskins; RIGHT partial mastectomy, adjuvant XRT    Bronchiectasis     Dr. Tyler Smith; RLL    COVID-19 virus infection 12/2020    Diabetes mellitus ([facility] 09/2013    on basis of elev a1c    Fibrocystic breast disease     Dr. Shyam Padilla GERD (gastroesophageal reflux disease)     Dr. Milagro Callahan s/p dilation 2/12    H/O pulmonary function tests 2011    ratio 70, FEV1 81 no change postbd, TLC 93, RV 94, DLCO 81    Hyperlipidemia     Hypovitaminosis D     Insomnia     Osteoarthritis     knee s/p cortisone; SI joint on xray 7/21    Osteopenia     DEXA t-score -1.2 spine, -0.2 hip (4/08);  spine -1.2, hip 0.1 (10/10);  -1.8 spine, -0.3 FRAX 6.3/0.2 (7/13); [facility] -1.7 spine, 0.1 hip (4/17); -1.7 spine, 0.2 hip (9/21)    Overweight (BMI 25.0-29. 9)     IF 5/18 but unwilling to do; phentermine 1/19 start weight 145 lbs    Rotator cuff injury 2000s    conservative therapy LEFT    Sinus tachycardia 2018    Dr Yvette Ragsdale    Spontaneous pneumothorax 1970s    x3    Zoster right S1 3/12       Family History   Problem Relation Age of Onset    Breast Cancer Mother     Ovarian Cancer Mother     Diabetes Father     Stroke Father     Diabetes Sister     Breast Cancer Maternal Grandmother     Breast Cancer Paternal Grandmother     Breast Cancer Paternal Aunt        Current Outpatient Medications   Medication Sig Dispense Refill    clotrimazole-betamethasone (LOTRISONE) topical cream clotrimazole-betamethasone 1 %-0.05 % topical cream      empagliflozin (Jardiance) 10 mg tablet Jardiance 10 mg tablet      ergocalciferol (ERGOCALCIFEROL) 1,250 mcg (50,000 unit) capsule TAKE 1 CAPSULE BY MOUTH 1 TIME PER WEEK 12 Capsule 4    diazePAM (VALIUM) 5 mg tablet TAKE 1 TABLET BY MOUTH EVERY 12 HOURS AS NEEDED FOR ANXIETY.  MAX DAILY AMOUNT: 10 MG. 40 Tablet 1    meclizine (ANTIVERT) 25 mg tablet Take 1 Tablet by mouth three (3) times daily as needed for Dizziness. 30 Tablet 0    predniSONE (STERAPRED DS) 10 mg dose pack See administration instruction per 10mg dose pack 21 Tablet 0    pravastatin (PRAVACHOL) 40 mg tablet TAKE 1 TABLET BY MOUTH NIGHTLY. 90 Tablet 3    sAXagliptin (ONGLYZA) 5 mg tab tablet Take 1 Tablet by mouth daily. 30 Tablet 11    Dexilant 60 mg CpDB capsule (delayed release) TAKE 1 CAPSULE BY MOUTH EVERY DAY 90 Capsule 3    cyanocobalamin 1,000 mcg tablet Take 1,000 mcg by mouth daily.  vitamin e (E GEMS) 100 unit capsule Take 100 Units by mouth daily.  albuterol (Ventolin HFA) 90 mcg/actuation inhaler Take 2 Puffs by inhalation every six (6) hours as needed for Wheezing. Indications: bronchospasm 1 Inhaler 3    zinc sulfate (ZINC-15 PO) Take  by mouth.  ferrous sulfate (Iron) 325 mg (65 mg iron) tablet Take  by mouth Daily (before breakfast).  calcium carbonate (CALCIUM 300 PO) Take  by mouth.  ADVAIR DISKUS 250-50 mcg/dose diskus inhaler TAKE 1 PUFF BY INHALATION TWO (2) TIMES A DAY. (Patient taking differently: TAKE 1 PUFF BY INHALATION as needed) 1 Inhaler 5    tamoxifen (NOLVADEX) 10 mg tablet Take  by mouth daily.  b complex vitamins (B COMPLEX 1) tablet Take 1 Tab by mouth daily.          Allergies   Allergen Reactions    Actos [Pioglitazone] Other (comments)     Felt bad, weight gain    Avelox [Moxifloxacin] Other (comments)     Felt jittery    Crestor [Rosuvastatin] Myalgia    Evista [Raloxifene] Other (comments)     Severe flashes    Jardiance [Empagliflozin] Other (comments)     Recurring yeast infections    Lipitor [Atorvastatin] Other (comments)     Leg cramps and muscle aches    Macrobid [Nitrofurantoin Monohyd/M-Cryst] Hives    Metformin Other (comments)     Gi intolerance    Neuromuscular Blockers, Steroidal Other (comments)     \"patient feels crazy\"    Nsaids (Non-Steroidal Anti-Inflammatory Drug) Other (comments)     H/o gastropathy    Phentermine Other (comments)     Felt bad       Past Surgical History:   Procedure Laterality Date    HX BREAST BIOPSY      RIGHT breast biopsy (); RIGHT breast lumpectomy ()    HX COLONOSCOPY      Dr. Zachary Feldman negative ; diverticulosis  Dr Niall Fritz; Dr Zachary Feldman 17 neg    HX ENDOSCOPY  2018    Dr Zachary Feldman; gastropathy, h pylori neg    HX HYSTERECTOMY      fibroids/ovarian cysts - Dr. 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     Employer: [employer]   Tobacco Use    Smoking status: Former Smoker     Packs/day: 1.00     Years: 5.00     Pack years: 5.00     Quit date: 1973     Years since quittin.5    Smokeless tobacco: Never Used   Vaping Use    Vaping Use: Never used   Substance and Sexual Activity    Alcohol use: No    Drug use: No    Sexual activity: Not on file   Other Topics Concern    Not on file   Social History Narrative    Not on file     Social Determinants of Health     Financial Resource Strain:     Difficulty of Paying Living Expenses: Not on file   Food Insecurity:     Worried About [address] in the Last Year: Not on file    [address] in the Last Year: Not on file   Transportation Needs:     Lack of Transportation (Medical): Not on file    Lack of Transportation (Non-Medical):  Not on file   Physical Activity:     Days of Exercise per Week: Not on file    Minutes of Exercise per Session: Not on file   Stress:     Feeling of Stress : Not on file   Social Connections:     Frequency of Communication with Friends and Family: Not on file    Frequency of Social Gatherings with Friends and Family: Not on file    Attends [employer] Services: Not on file    Active Member of [address] [place] [address] or Organizations: Not on file    Attends Club or Organization Meetings: Not on file    Marital Status: Not on file   Intimate Partner Violence:     Fear of Current or Ex-Partner: Not on file    Emotionally Abused: Not on file    Physically Abused: Not on file    Sexually Abused: Not on file   Housing Stability:     Unable to Pay for Housing in the Last Year: Not on file    Number of [place] in the Last Year: Not on file    Unstable Housing in the Last Year: Not on file       Visit Vitals  Pulse (!) 107   Temp 97.3 °F (36.3 °C) (Temporal)   Ht 5' 3\" (1.6 m)   Wt 58.6 kg (129 lb 3.2 oz)   SpO2 98%   BMI 22.89 kg/m²         PHYSICAL EXAMINATION:  GENERAL: Alert and oriented x3, in no acute distress, well-developed, well-nourished, afebrile. HEART: No JVD. EYES: No scleral icterus   NECK: No significant lymphadenopathy   LUNGS: No respiratory compromise or indrawing  ABDOMEN: Soft, non-tender, non-distended. Note: This note was completed using voice recognition software. Any typographical/name errors or mistakes are unintentional.    Electronically signed by:  Naima Ba MD

## 2022-02-08 NOTE — PROGRESS NOTES
73 y.o. WHITE/NON- female who presents for evaluation. She continues to tamox for RIGHT breast ca and was being followed by Dr. Tiny Shultz until her detention. Her next appointment with the specialist is not until November so she needs refills. Continues to do at least 6-8k steps daily on her counter, no cardiovascular complaints. Denies polyuria, polydipsia, nocturia, vision change. She regained a little bit of the weight over the holidays but still remains significantly lower than a year ago. Vitals 8/13/2021 7/1/2021 7/1/2021 4/13/2021 2/25/2021   Weight 129 lb  133 lb 143 lb 145 lb 9.6 oz     Remains on PPI and iron supplementation, no GI complaints to report    She has not seen Dr. Tobin Donnelly for bronchiectasis of late, remains on Advair as needed    She did have sciatica earlier this year she got better with steroid taper. She got cortisone shot into the hip by Dr Abe Schmitz as well. She was able to wean off the gabapentin    LAST MEDICARE WELLNESS EXAM: never as not medicare b    Past Medical History:   Diagnosis Date    ADD (attention deficit disorder) 2014    [facility] psych    Amebic colitis 1970s    Anemia, iron deficiency 05/2018    eval Dr Emily Gusman showed gastritis    Breast [facility] ([facility] 02/2016    Dr Tiny Shultz; RIGHT partial mastectomy, adjuvant XRT    Bronchiectasis     Dr. Sukumar Schneider; RLL    COVID-19 virus infection 12/2020    Diabetes mellitus ([facility] 09/2013    on basis of elev a1c    Fibrocystic breast disease     Dr. Maria Luisa Soliz GERD (gastroesophageal reflux disease)     Dr. Emily Gusman s/p dilation 2/12    H/O pulmonary function tests 2011    ratio 70, FEV1 81 no change postbd, TLC 93, RV 94, DLCO 81    Hyperlipidemia     Hypovitaminosis D     Insomnia     Osteoarthritis     knee s/p cortisone; SI joint on xray 7/21    Osteopenia     DEXA t-score -1.2 spine, -0.2 hip (4/08);  spine -1.2, hip 0.1 (10/10);  -1.8 spine, -0.3 FRAX 6.3/0.2 (7/13);  [facility] -1.7 spine, 0.1 hip (4/17); -1.7 spine, 0.2 hip ()    Overweight (BMI 25.0-29. 9)     IF  but unwilling to do; phentermine  start weight 145 lbs    Rotator cuff injury     conservative therapy LEFT    Sinus tachycardia     Dr Rashad Del Castillo Spontaneous pneumothorax 1970s    x3    Zoster right S1 3/12     Past Surgical History:   Procedure Laterality Date    HX BREAST BIOPSY      RIGHT breast biopsy (); RIGHT breast lumpectomy ()    HX COLONOSCOPY      Dr. Val Young negative ; diverticulosis  Dr Brett Loredo; Dr Val Young 17 neg    HX ENDOSCOPY  2018    Dr Val Young; gastropathy, h pylori neg    HX HYSTERECTOMY      fibroids/ovarian cysts - Dr. Matias Back HX ORTHOPAEDIC  2017    Dr Cee Gómez; LEFT radial fx, LEFT scaphoid fx after fall    NM BREAST SURGERY PROCEDURE UNLISTED  2011    LEFT nipple duct exploration and excisional biopsy    NM CARDIAC SURG PROCEDURE UNLIST      NST negative, ef 75%    NM CARDIAC SURG PROCEDURE UNLIST  10/2018    Dr Kt Lewis; 30d monitor neg     Social History     Socioeconomic History    Marital status:      Spouse name: Not on file    Number of children: 2    Years of education: Not on file    Highest education level: Not on file   Occupational History    Occupation: Luis Enrique Shipman principal     Employer: [employer]   Tobacco Use    Smoking status: Former Smoker     Packs/day: 1.00     Years: 5.00     Pack years: 5.00     Quit date: 1973     Years since quittin.5    Smokeless tobacco: Never Used   Vaping Use    Vaping Use: Never used   Substance and Sexual Activity    Alcohol use: No    Drug use: No    Sexual activity: Not on file   Other Topics Concern    Not on file   Social History Narrative    Not on file     Social Determinants of Health     Financial Resource Strain:     Difficulty of Paying Living Expenses: Not on file   Food Insecurity:     Worried About [address] in the Last Year: Not on file    [address] in the Last Year: Not on file   Transportation Needs:     Lack of Transportation (Medical): Not on file    Lack of Transportation (Non-Medical): Not on file   Physical Activity:     Days of Exercise per Week: Not on file    Minutes of Exercise per Session: Not on file   Stress:     Feeling of Stress : Not on file   Social Connections:     Frequency of Communication with Friends and Family: Not on file    Frequency of Social Gatherings with Friends and Family: Not on file    Attends Anabaptism Services: Not on file    Active Member of [address] [place] [address] [employer] or Organizations: Not on file    Attends Club or Organization Meetings: Not on file    Marital Status: Not on file   Intimate Partner Violence:     Fear of Current or Ex-Partner: Not on file    Emotionally Abused: Not on file    Physically Abused: Not on file    Sexually Abused: Not on file   Housing Stability:     Unable to Pay for Housing in the Last Year: Not on file    Number of [place] in the Last Year: Not on file    Unstable Housing in the Last Year: Not on file     Current Outpatient Medications   Medication Sig    clotrimazole-betamethasone (LOTRISONE) topical cream clotrimazole-betamethasone 1 %-0.05 % topical cream    ergocalciferol (ERGOCALCIFEROL) 1,250 mcg (50,000 unit) capsule TAKE 1 CAPSULE BY MOUTH 1 TIME PER WEEK    diazePAM (VALIUM) 5 mg tablet TAKE 1 TABLET BY MOUTH EVERY 12 HOURS AS NEEDED FOR ANXIETY. MAX DAILY AMOUNT: 10 MG.  meclizine (ANTIVERT) 25 mg tablet Take 1 Tablet by mouth three (3) times daily as needed for Dizziness.  pravastatin (PRAVACHOL) 40 mg tablet TAKE 1 TABLET BY MOUTH NIGHTLY.  sAXagliptin (ONGLYZA) 5 mg tab tablet Take 1 Tablet by mouth daily.  Dexilant 60 mg CpDB capsule (delayed release) TAKE 1 CAPSULE BY MOUTH EVERY DAY    cyanocobalamin 1,000 mcg tablet Take 1,000 mcg by mouth daily.  vitamin e (E GEMS) 100 unit capsule Take 100 Units by mouth daily.     albuterol (Ventolin HFA) 90 mcg/actuation inhaler Take 2 Puffs by inhalation every six (6) hours as needed for Wheezing. Indications: bronchospasm    zinc sulfate (ZINC-15 PO) Take  by mouth.  ferrous sulfate (Iron) 325 mg (65 mg iron) tablet Take  by mouth Daily (before breakfast).  calcium carbonate (CALCIUM 300 PO) Take  by mouth.  ADVAIR DISKUS 250-50 mcg/dose diskus inhaler TAKE 1 PUFF BY INHALATION TWO (2) TIMES A DAY. (Patient taking differently: TAKE 1 PUFF BY INHALATION as needed)    tamoxifen (NOLVADEX) 10 mg tablet Take  by mouth daily.  b complex vitamins (B COMPLEX 1) tablet Take 1 Tab by mouth daily. No current facility-administered medications for this visit.      Allergies   Allergen Reactions    Actos [Pioglitazone] Other (comments)     Felt bad, weight gain    Avelox [Moxifloxacin] Other (comments)     Felt jittery    Crestor [Rosuvastatin] Myalgia    Evista [Raloxifene] Other (comments)     Severe flashes    Jardiance [Empagliflozin] Other (comments)     Recurring yeast infections    Lipitor [Atorvastatin] Other (comments)     Leg cramps and muscle aches    Macrobid [Nitrofurantoin Monohyd/M-Cryst] Hives    Metformin Other (comments)     Gi intolerance    Neuromuscular Blockers, Steroidal Other (comments)     \"patient feels crazy\"    Nsaids (Non-Steroidal Anti-Inflammatory Drug) Other (comments)     H/o gastropathy    Phentermine Other (comments)     Felt bad     REVIEW OF SYSTEMS: gyn 2012, mammo 9/20 Dr Selina Carmona, [address] 4/17, colo 12/13 Dr Roverto Alexander, Dr Moisés Pacheco  Ophtho - no vision change or eye pain  Oral - no mouth pain, tongue or tooth problems  Ears - no hearing loss, ear pain, fullness, no swallowing problems  Cardiac - no CP, PND, orthopnea, edema, palpitations or syncope  Chest - no breast masses  Resp - no wheezing, chronic coughing, dyspnea  GI - no heartburn, nausea, vomiting, change in bowel habits, bleeding, hemorrhoids  Urinary - no dysuria, hematuria, flank pain, urgency, frequency    Visit Vitals  /63   Pulse 92   Temp 97.7 °F (36.5 °C) (Temporal)   Resp 16   Ht 5' 3\" (1.6 m)   Wt 131 lb (59.4 kg)   SpO2 98%   BMI 23.21 kg/m²   A&O x3  Affect is appropriate. Mood stable  No apparent distress  Anicteric, no JVD, adenopathy or thyromegaly. No carotid bruits or radiated murmur  Lungs clear to auscultation, no wheezes or rales  Heart showed regular rate and rhythm. No murmur, rubs, gallops  Abdomen soft nontender, no hepatosplenomegaly or masses. Extremities without edema.   Pulses 1-2+ symmetrically    LABS  From 9/10 showed   gluc 106, cr 0.70, gfr>60, alt 42,                   chol 238, tg 154, hdl 54, ldl-c 153, wbc 6.5, hb 13.1, plt 258, ua neg  From 2/11 showed   gluc 95,   cr 0.90                                                                                                   wbc 7.0, hb 14.1, plt 308  From 3/12 showed   gluc 103, cr 0.90              alt 43,                                                                           wbc 6.2, hb 13.7, plt 285  From 3/13 showed   gluc 130, cr 0.60,             alt 22,                    chol 244, tg 180, hdl 57, ldl-c 151,                                          ua neg,     tsh 1.79  From 9/13 showed   gluc 119, cr 0.70, gfr>60,            hba1c 6.6, chol 228, tg 145, hdl 62, ldl-c 137, 2hr   From 2/14 showed   gluc 95,   cr 0.60, gfr>61, alt 20, hba1c 5.7,                    umar 2.8  From 4/14 showed   gluc 144, cr 0.98, gfr>60, alt 31,          wbc 7.6, hb 13.4, plt 317,                  ck/trop neg  From 10/14 showed gluc 98,   cr 0.60, gfr>60, alt 20,          chol 163, tg 96,   hdl 64, ldl-c 80  From 4/15 showed   gluc 96,   cr 0.70, gfr>60, alt 19, hba1c 6.1, chol 207, tg 126, hdl 68, ldl-c 114  From 10/15 showed        hba1c 6.3, chol 188, tg 118, hdl 66, ldl-c 98,   wbc 5.3, hb 13.3, plt 287, umar neg  From 4/16 showed   gluc 98,   cr 0.60, gfr>60, alt 19, hba1c 6.3, chol 170, tg 141, hdl 57, ldl-c 85  From 10/16 showed gluc 137, cr 0.50, gfr>60, alt 17, hba1c 6.2, chol 176, tg 302, hdl 54, ldl-c 62,   wbc 8.9, hb 12.3, plt 393, umar neg  From 1/17 showed   gluc 114, cr 0.60, gfr>60,           wbc 6.1, hb 12.9, plt 356  From 4/17 showed        hba1c 6.8, chol 223, tg 240, hdl 53, ldl-c 122  From 9/17 showed   gluc 109, cr 0.70, gfr>60, alt 16, hba1c 6.7,                  umar neg  From 1/18 showed   gluc 104, cr 0.60, gfr>60, alt 14, hba1c 6.8, chol 154, tg 197, hdl 60, ldl-c 55  From 5/18 showed        hba1c 7.0,          wbc 5.7, hb 11.3, plt 281, umar neg, fe 43, %sat 11, ferritin 7,   b12 588, fol 18.6, spep neg, retic 1.6  From 9/18 showed   gluc 143, cr 0.70, gfr>60,    hba1c 7.0, chol 155, tg 199, hdl 56, ldl-c 59  From 1/19 showed                               fe 80, %sat 28, ferritin 28, vit d 37.1  From 5/19 showed   gluc 155, cr 0.70, gfr>60, alt 22, hba1c 7.4, chol 139, tg 172, hdl 49, ldl-c 55,   wbc 6.0, hb 13.3, plt 214, umar neg  From 10/19 showed        hba1c 6.8,                  umar 9.8,  fe 79, %sat 29,           vit d 48.7  From 4/20 showed        hba1c 7.0, chol 142, tg 115, hdl 50, ldl-c 69                  vit d 42.8  From 10/20 showed gluc 85,   cr 0.60, gfr>60, alt 21, hba1c 7.0,          wbc 6.1, hb 13.3, plt 248,              vit d 33.4  From 4/21 showed       hba1c 7.6, chol 163, tg 307, hdl 52, ldl-c 50,    wbc 6.4, hb 14.2, plt 232  From 8/21 showed   gluc 136, cr 0.60, gfr>60, alt 19, hba1c 6.8    Results for orders placed or performed during the hospital encounter of 02/11/22   MICROALBUMIN, UR, RAND W/ MICROALB/CREAT RATIO   Result Value Ref Range    Microalbumin,urine random 1.14 0 - 3.0 MG/DL    Creatinine, urine 140.00 (H) 30 - 125 mg/dL    Microalbumin/Creat ratio (mg/g creat) 8 0 - 30 mg/g   CBC W/O DIFF   Result Value Ref Range    WBC 4.9 4.6 - 13.2 K/uL    RBC 4.69 4.20 - 5.30 M/uL    HGB 13.6 12.0 - 16.0 g/dL    HCT 43.6 35.0 - 45.0 %    MCV 93.0 78.0 - 100.0 FL MCH 29.0 24.0 - 34.0 PG    MCHC 31.2 31.0 - 37.0 g/dL    RDW 13.2 11.6 - 14.5 %    PLATELET 786 395 - 243 K/uL    MPV 9.8 9.2 - 11.8 FL    NRBC 0.0 0  WBC    ABSOLUTE NRBC 0.00 0.00 - 6.89 K/uL   METABOLIC PANEL, BASIC   Result Value Ref Range    Sodium 143 136 - 145 mmol/L    Potassium 4.1 3.5 - 5.5 mmol/L    Chloride 109 100 - 111 mmol/L    CO2 28 21 - 32 mmol/L    Anion gap 6 3.0 - 18 mmol/L    Glucose 140 (H) 74 - 99 mg/dL    BUN 20 (H) 7.0 - 18 MG/DL    Creatinine 0.67 0.6 - 1.3 MG/DL    BUN/Creatinine ratio 30 (H) 12 - 20      GFR est AA >60 >60 ml/min/1.73m2    GFR est non-AA >60 >60 ml/min/1.73m2    Calcium 9.2 8.5 - 10.1 MG/DL   LIPID PANEL   Result Value Ref Range    LIPID PROFILE          Cholesterol, total 158 <200 MG/DL    Triglyceride 157 (H) <150 MG/DL    HDL Cholesterol 69 (H) 40 - 60 MG/DL    LDL, calculated 57.6 0 - 100 MG/DL    VLDL, calculated 31.4 MG/DL    CHOL/HDL Ratio 2.3 0 - 5.0     HEMOGLOBIN A1C WITH EAG   Result Value Ref Range    Hemoglobin A1c 6.7 (H) 4.2 - 5.6 %    Est. average glucose 146 mg/dL     We reviewed the patient's labs from the last several visits to point out trends in the numbers          Patient Active Problem List   Diagnosis Code    Bronchiectasis Dr. Stormy De León J47.9    Osteopenia  M85.80    Hyperlipidemia E78.5    Gastroesophageal reflux disease without esophagitis K21.9    Type II diabetes mellitus, uncontrolled ([facility] E11.65    History of breast cancer Z85.3    Vitamin D deficiency E55.9    Overweight (BMI 25.0-29. 9) E66.3     Assessment and plan:  1. Pulm. F/U Dr Saad Merrill and continue current  2. GERD. Continue ppi and avoidance measures  3. Osteopenia. Ca/d, wt bearing exercises  4. DM. Controlled on current, doing better with diet  5. Lipids. Continue prava, vascepa not covered  6  Breast ca. Per her new specialist, tamoxifen refilled at her request  7. Overweight. Lifestyle and dietary measures.   congratulated her on the wt loss so far, she's aiming for 125lbs  8. Arthralgias. Prn celebrex        RTC 8/22    Above conditions discussed at length and patient vocalized understanding. All questions answered to patient satisfaction      ICD-10-CM ICD-9-CM    1. Gastroesophageal reflux disease without esophagitis  K21.9 530.81    2. Uncontrolled type 2 diabetes mellitus with hyperglycemia (HCC)  E11.65 250.02    3. [facility]  S61.6 494.0    4. Hyperlipidemia, unspecified hyperlipidemia type  E78.5 272.4    5.  History of breast cancer  Z85.3 V10.3

## 2022-02-11 ENCOUNTER — APPOINTMENT (OUTPATIENT)
Dept: INTERNAL MEDICINE CLINIC | Age: 72
End: 2022-02-11

## 2022-02-11 ENCOUNTER — HOSPITAL ENCOUNTER (OUTPATIENT)
Dept: LAB | Age: 72
Discharge: HOME | End: 2022-02-11
Payer: COMMERCIAL

## 2022-02-11 DIAGNOSIS — E11.65 UNCONTROLLED TYPE 2 DIABETES MELLITUS WITH HYPERGLYCEMIA (HCC): ICD-10-CM

## 2022-02-11 LAB
ANION GAP SERPL CALC-SCNC: 6 MMOL/L (ref 3–18)
BUN SERPL-MCNC: 20 MG/DL (ref 7–18)
BUN/CREAT SERPL: 30 (ref 12–20)
CALCIUM SERPL-MCNC: 9.2 MG/DL (ref 8.5–10.1)
CHLORIDE SERPL-SCNC: 109 MMOL/L (ref 100–111)
CHOLEST SERPL-MCNC: 158 MG/DL
CO2 SERPL-SCNC: 28 MMOL/L (ref 21–32)
CREAT SERPL-MCNC: 0.67 MG/DL (ref 0.6–1.3)
CREAT UR-MCNC: 140 MG/DL (ref 30–125)
ERYTHROCYTE [DISTWIDTH] IN BLOOD BY AUTOMATED COUNT: 13.2 % (ref 11.6–14.5)
EST. AVERAGE GLUCOSE BLD GHB EST-MCNC: 146 MG/DL
GLUCOSE SERPL-MCNC: 140 MG/DL (ref 74–99)
HBA1C MFR BLD: 6.7 % (ref 4.2–5.6)
HCT VFR BLD AUTO: 43.6 % (ref 35–45)
HDLC SERPL-MCNC: 69 MG/DL (ref 40–60)
HDLC SERPL: 2.3 {RATIO} (ref 0–5)
HGB BLD-MCNC: 13.6 G/DL (ref 12–16)
LDLC SERPL CALC-MCNC: 57.6 MG/DL (ref 0–100)
LIPID PROFILE,FLP: ABNORMAL
MCH RBC QN AUTO: 29 PG (ref 24–34)
MCHC RBC AUTO-ENTMCNC: 31.2 G/DL (ref 31–37)
MCV RBC AUTO: 93 FL (ref 78–100)
MICROALBUMIN UR-MCNC: 1.14 MG/DL (ref 0–3)
MICROALBUMIN/CREAT UR-RTO: 8 MG/G (ref 0–30)
NRBC # BLD: 0 K/UL (ref 0–0.01)
NRBC BLD-RTO: 0 PER 100 WBC
PLATELET # BLD AUTO: 259 K/UL (ref 135–420)
PMV BLD AUTO: 9.8 FL (ref 9.2–11.8)
POTASSIUM SERPL-SCNC: 4.1 MMOL/L (ref 3.5–5.5)
RBC # BLD AUTO: 4.69 M/UL (ref 4.2–5.3)
SODIUM SERPL-SCNC: 143 MMOL/L (ref 136–145)
TRIGL SERPL-MCNC: 157 MG/DL (ref ?–150)
VLDLC SERPL CALC-MCNC: 31.4 MG/DL
WBC # BLD AUTO: 4.9 K/UL (ref 4.6–13.2)

## 2022-02-11 PROCEDURE — 82043 UR ALBUMIN QUANTITATIVE: CPT

## 2022-02-11 PROCEDURE — 83036 HEMOGLOBIN GLYCOSYLATED A1C: CPT

## 2022-02-11 PROCEDURE — 80061 LIPID PANEL: CPT

## 2022-02-11 PROCEDURE — 36415 COLL VENOUS BLD VENIPUNCTURE: CPT

## 2022-02-11 PROCEDURE — 80048 BASIC METABOLIC PNL TOTAL CA: CPT

## 2022-02-11 PROCEDURE — 85027 COMPLETE CBC AUTOMATED: CPT

## 2022-02-14 ENCOUNTER — OFFICE VISIT (OUTPATIENT)
Dept: INTERNAL MEDICINE CLINIC | Age: 72
End: 2022-02-14
Payer: COMMERCIAL

## 2022-02-14 VITALS
TEMPERATURE: 97.7 F | HEART RATE: 92 BPM | SYSTOLIC BLOOD PRESSURE: 132 MMHG | HEIGHT: 63 IN | WEIGHT: 131 LBS | BODY MASS INDEX: 23.21 KG/M2 | RESPIRATION RATE: 16 BRPM | OXYGEN SATURATION: 98 % | DIASTOLIC BLOOD PRESSURE: 63 MMHG

## 2022-02-14 DIAGNOSIS — K21.9 GASTROESOPHAGEAL REFLUX DISEASE WITHOUT ESOPHAGITIS: ICD-10-CM

## 2022-02-14 DIAGNOSIS — E11.65 UNCONTROLLED TYPE 2 DIABETES MELLITUS WITH HYPERGLYCEMIA (HCC): Primary | ICD-10-CM

## 2022-02-14 DIAGNOSIS — C50.911 MALIGNANT NEOPLASM OF RIGHT FEMALE BREAST, UNSPECIFIED ESTROGEN RECEPTOR STATUS, UNSPECIFIED SITE OF BREAST (HCC): ICD-10-CM

## 2022-02-14 DIAGNOSIS — J47.9 BRONCHIECTASIS WITHOUT COMPLICATION (HCC): ICD-10-CM

## 2022-02-14 DIAGNOSIS — E78.5 HYPERLIPIDEMIA, UNSPECIFIED HYPERLIPIDEMIA TYPE: ICD-10-CM

## 2022-02-14 DIAGNOSIS — Z85.3 HISTORY OF BREAST CANCER: ICD-10-CM

## 2022-02-14 PROBLEM — C50.919 MALIGNANT NEOPLASM OF UNSPECIFIED SITE OF UNSPECIFIED FEMALE BREAST (HCC): Status: ACTIVE | Noted: 2022-02-14

## 2022-02-14 PROBLEM — C50.912 MALIGNANT NEOPLASM OF UNSPECIFIED SITE OF LEFT FEMALE BREAST (HCC): Status: ACTIVE | Noted: 2022-02-14

## 2022-02-14 PROCEDURE — 99214 OFFICE O/P EST MOD 30 MIN: CPT | Performed by: INTERNAL MEDICINE

## 2022-02-14 RX ORDER — TAMOXIFEN CITRATE 20 MG/1
20 TABLET ORAL DAILY
Qty: 90 TABLET | Status: CANCELLED | OUTPATIENT
Start: 2022-02-14

## 2022-02-14 RX ORDER — TAMOXIFEN CITRATE 10 MG/1
10 TABLET, FILM COATED ORAL DAILY
Qty: 90 TABLET | Refills: 3 | Status: SHIPPED | OUTPATIENT
Start: 2022-02-14 | End: 2022-02-16 | Stop reason: DRUGHIGH

## 2022-02-14 NOTE — PROGRESS NOTES
Polo Ohm [id number] presents with   Chief Complaint   Patient presents with    Follow-up     6 month    Cholesterol Problem    Labs     2-11-22            1. \"Have you been to the ER, urgent care clinic since your last visit? Hospitalized since your last visit? \" NO    2. \"Have you seen or consulted any other health care providers outside of the [address] [place] [address] since your last visit? \" NO    3. For patients aged 39-70: Has the patient had a colonoscopy? Yes - no Care Gap present     If the patient is female:    4. For patients aged 41-77: Has the patient had a mammogram within the past 2 years? Yes - no Care Gap present    5. For patients aged 21-65: Has the patient had a pap smear?  No

## 2022-02-15 ENCOUNTER — TELEPHONE (OUTPATIENT)
Dept: INTERNAL MEDICINE CLINIC | Age: 72
End: 2022-02-15

## 2022-02-15 DIAGNOSIS — Z85.3 HISTORY OF BREAST CANCER: Primary | ICD-10-CM

## 2022-02-15 NOTE — TELEPHONE ENCOUNTER
Per pt had OV yesterday and the wrong mg got sent to pharmacy for tamoxifen. Stated tamoxifen 10 mg was sent and she has been on 20 mg for the last 5 years. She will use the 10 mg and take 2 pills until you can send in the 20 mg.      Pharmacy is [facility]

## 2022-02-16 RX ORDER — TAMOXIFEN CITRATE 20 MG/1
20 TABLET ORAL DAILY
Qty: 90 TABLET | Refills: 3 | Status: SHIPPED | OUTPATIENT
Start: 2022-02-16

## 2022-02-22 ENCOUNTER — TELEPHONE (OUTPATIENT)
Dept: INTERNAL MEDICINE CLINIC | Age: 72
End: 2022-02-22

## 2022-02-22 NOTE — TELEPHONE ENCOUNTER
Prior Auth for Dexilant 60mg caps denied patient would need to have failed 4 from the list below before insurance would cover .       Covered alternatives   1- Omeprazole  2-Pantoprazole  3-Rabeprazole  4-OTC Nexium  5- Prevacid  6-Prilosec  7-Zegerid

## 2022-02-22 NOTE — TELEPHONE ENCOUNTER
Reached patient and gave her information, she stated that she has already gone through this, and that is how she got put on the dexilant to begin with.   She stated she has tried prilosec, prevacid, and nexium, nexium was the last medication patient was on before changing to dexilant in 2011, she stated that she would contact Dr. Kirill Tate office in order to get the records faxed over for the approval.

## 2022-02-22 NOTE — TELEPHONE ENCOUNTER
pls call pt  Will need to change  Which of the following has she tried in past?    Covered alternatives   1- Omeprazole  2-Pantoprazole  3-Rabeprazole  4-OTC Nexium  5- Prevacid  6-Prilosec  7-Zegerid

## 2022-03-14 DIAGNOSIS — F41.9 ANXIETY: ICD-10-CM

## 2022-03-14 RX ORDER — DIAZEPAM 5 MG/1
TABLET ORAL
Qty: 40 TABLET | Refills: 1 | Status: SHIPPED | OUTPATIENT
Start: 2022-03-14 | End: 2022-04-15 | Stop reason: SDUPTHER

## 2022-03-14 NOTE — TELEPHONE ENCOUNTER
[facility]15/22 for a 20 d/s. Last Visit: 2/14/22 with MD Gilma Amaro  Next Appointment: 8/22/22 with MD Wise  Previous Refill Encounter(s): 1/14/22 #40 with 1 refill    Requested Prescriptions     Pending Prescriptions Disp Refills    diazePAM (VALIUM) 5 mg tablet 40 Tablet 1     Sig: TAKE 1 TABLET BY MOUTH EVERY 12 HOURS AS NEEDED FOR ANXIETY. MAX DAILY AMOUNT: 10 MG.

## 2022-03-18 PROBLEM — C50.919 MALIGNANT NEOPLASM OF UNSPECIFIED SITE OF UNSPECIFIED FEMALE BREAST (HCC): Status: ACTIVE | Noted: 2022-02-14

## 2022-03-18 PROBLEM — C50.911 MALIGNANT NEOPLASM OF UNSPECIFIED SITE OF RIGHT FEMALE BREAST (HCC): Status: ACTIVE | Noted: 2022-02-14

## 2022-03-18 PROBLEM — C50.912 MALIGNANT NEOPLASM OF UNSPECIFIED SITE OF LEFT FEMALE BREAST (HCC): Status: ACTIVE | Noted: 2022-02-14

## 2022-03-19 PROBLEM — E66.3 OVERWEIGHT (BMI 25.0-29.9): Status: ACTIVE | Noted: 2019-01-14

## 2022-03-19 PROBLEM — E55.9 VITAMIN D DEFICIENCY: Status: ACTIVE | Noted: 2018-09-14

## 2022-03-20 PROBLEM — Z85.3 HISTORY OF BREAST CANCER: Status: ACTIVE | Noted: 2018-05-10

## 2022-04-15 DIAGNOSIS — F41.9 ANXIETY: ICD-10-CM

## 2022-04-15 NOTE — TELEPHONE ENCOUNTER
[facility] date for Valium as 3/14/21 for a 20 d/s. Last Visit: 2/14/22 with MD Shawn Davison  Next Appointment: 8/22/22 with MD Shawn Davison  Previous Refill Encounter(s): 3/14/22 #40 with 1 refill    Requested Prescriptions     Pending Prescriptions Disp Refills    diazePAM (VALIUM) 5 mg tablet 40 Tablet 1     Sig: TAKE 1 TABLET BY MOUTH EVERY 12 HOURS AS NEEDED FOR ANXIETY. MAX DAILY AMOUNT: 10 MG.

## 2022-04-18 RX ORDER — DIAZEPAM 5 MG/1
TABLET ORAL
Qty: 40 TABLET | Refills: 1 | Status: SHIPPED | OUTPATIENT
Start: 2022-04-18 | End: 2022-07-19 | Stop reason: SDUPTHER

## 2022-05-18 ENCOUNTER — TELEPHONE (OUTPATIENT)
Dept: INTERNAL MEDICINE CLINIC | Age: 72
End: 2022-05-18

## 2022-05-18 NOTE — TELEPHONE ENCOUNTER
----- Message from Violeta Long sent at 5/18/2022 10:13 AM EDT -----  Subject: Message to Provider    QUESTIONS  Information for Provider? Patient wants to know why she is not scheduled   with Dr. Madonna Bloch on 8/22. She has never seen Dr. Eliceo Perkins. Please call to   advise her.   ---------------------------------------------------------------------------  --------------  CALL BACK INFO  What is the best way for the office to contact you? OK to leave message on   voicemail  Preferred Call Back Phone Number? [phone]  ---------------------------------------------------------------------------  --------------  SCRIPT ANSWERS  Relationship to Patient?  Self

## 2022-06-30 NOTE — TELEPHONE ENCOUNTER
Patient is calling for this stating she is out of the medication and needs it before she goes out of town on Saturday.

## 2022-07-01 RX ORDER — DEXLANSOPRAZOLE 60 MG/1
CAPSULE, DELAYED RELEASE ORAL
Qty: 90 CAPSULE | Refills: 3 | Status: SHIPPED | OUTPATIENT
Start: 2022-07-01 | End: 2022-09-02

## 2022-07-06 ENCOUNTER — TELEPHONE (OUTPATIENT)
Dept: INTERNAL MEDICINE CLINIC | Age: 72
End: 2022-07-06

## 2022-07-18 ENCOUNTER — TELEPHONE (OUTPATIENT)
Dept: INTERNAL MEDICINE CLINIC | Age: 72
End: 2022-07-18

## 2022-07-18 DIAGNOSIS — K21.9 GASTROESOPHAGEAL REFLUX DISEASE WITHOUT ESOPHAGITIS: Primary | ICD-10-CM

## 2022-07-18 RX ORDER — PANTOPRAZOLE SODIUM 40 MG/1
40 TABLET, DELAYED RELEASE ORAL DAILY
Qty: 90 TABLET | Refills: 3 | Status: SHIPPED | OUTPATIENT
Start: 2022-07-18

## 2022-07-18 NOTE — TELEPHONE ENCOUNTER
Pt calling, stated [facility] was supposed to send Dr QUARLES a fax re Dede Lowe and getting a replacement medication for it. Rowena Pelletier is too expensive. It now cost $833. For a 3 month supply. She did not get the last RX because of that. She said she has been without for 2 weeks but has been taking OTC what the pharmacist recommended prevacid which is not helping much. She is also having to take gaviscon with it.      Asking please expedite     Send to [facility]

## 2022-07-19 DIAGNOSIS — F41.9 ANXIETY: ICD-10-CM

## 2022-07-19 NOTE — TELEPHONE ENCOUNTER
[facility] Valium as 6/16/22 for a 20 d/s. Last Visit: 2/14/22 with MD Deshawn Cohen  Next Appointment: 8/22/22 with MD Deshawn Cohen  Previous Refill Encounter(s): 4/18/22 #40 with 1 refill    Requested Prescriptions     Pending Prescriptions Disp Refills    diazePAM (VALIUM) 5 mg tablet 40 Tablet 1     Sig: Take 1 Tablet by mouth every twelve (12) hours as needed for Anxiety. Max Daily Amount: 10 mg.          For Lebron Carolina in place:    Recommendation Provided To:    Intervention Detail: New Rx: 1, reason: Patient Preference   Gap Closed?:    Intervention Accepted By:   Addy Tolbert Time Spent (min): 5

## 2022-07-20 RX ORDER — DIAZEPAM 5 MG/1
5 TABLET ORAL
Qty: 40 TABLET | Refills: 1 | Status: SHIPPED | OUTPATIENT
Start: 2022-07-20 | End: 2022-10-14 | Stop reason: SDUPTHER

## 2022-07-22 DIAGNOSIS — E11.65 UNCONTROLLED TYPE 2 DIABETES MELLITUS WITH HYPERGLYCEMIA (HCC): ICD-10-CM

## 2022-07-23 RX ORDER — SAXAGLIPTIN 5 MG/1
TABLET, FILM COATED ORAL
Qty: 30 TABLET | Refills: 11 | Status: SHIPPED | OUTPATIENT
Start: 2022-07-23

## 2022-08-14 ENCOUNTER — TELEPHONE (OUTPATIENT)
Dept: INTERNAL MEDICINE CLINIC | Age: 72
End: 2022-08-14

## 2022-08-14 DIAGNOSIS — U07.1 COVID-19: Primary | ICD-10-CM

## 2022-08-14 NOTE — TELEPHONE ENCOUNTER
She called on Sunday with cold sx that began earlier this morning. She took a Covid test and is positive. PLAN:  - paxlovid ordered and sent to her pharmacy  - pt instructed to hold statin and onyglyza while on rx  - low carb diet recommended while off of the meds above x 5 days. - quarantine recommendations discussed. - pt instructed to go to the ED if sx worsen.      Dr. Cassie Bryan  [facility], [address] [place] [address], [address] [place] [address] Str.  Phone: [phone]  Fax: [phone]

## 2022-08-14 NOTE — PROGRESS NOTES
She called on Sunday with cold sx that began earlier this morning. She took a Covid test and is positive. PLAN:  - paxlovid ordered and sent to her pharmacy  - pt instructed to hold statin and onyglyza while on rx  - low carb diet recommended while off of the meds above x 5 days. - quarantine recommendations discussed. - pt instructed to go to the ED if sx worsen.     Dr. Sydney Smith  [facility] [address], [address] [place] [address] Str.  Phone: [phone]  Fax: [phone]

## 2022-08-15 ENCOUNTER — TELEPHONE (OUTPATIENT)
Dept: INTERNAL MEDICINE CLINIC | Age: 72
End: 2022-08-15

## 2022-08-28 NOTE — PROGRESS NOTES
73 y.o. WHITE/NON- female who presents for evaluation. She continues to tamox for RIGHT breast ca and was being followed by Dr. Dwight Becerra until her [facility]. She wants to see Dr Mirza Jackson for follow up and wants referral    Continues to do at least 6-8k steps daily on her counter, no cardiovascular complaints. Denies polyuria, polydipsia, nocturia, vision change. Not checking sugars. She is intol of actos, metformin jardiance as below and a1c still climbing. She has regained some weight    Vitals 9/2/2022 2/14/2022 2/14/2022 1/28/2022 1/3/2022   Weight 136 lb  131 lb 129 lb 3.2 oz 126 lb 12.8 oz     Vitals 11/5/2021 8/13/2021   Weight 126 lb 129 lb     Remains on PPI and iron supplementation, no GI complaints to report    She has not seen Dr. Usama Aguilar for bronchiectasis of late, remains on Advair as needed.   Of note, she recently had covid earlier this month and seems to have recovered well    She has been having sx that sound like RLS, her legs bother her at night with aches and the need to move them, no paresthesias or burning discomfort    Reports that she will be getting medicare in the near future to help with med coverage    LAST MEDICARE WELLNESS EXAM: never as not medicare b    Past Medical History:   Diagnosis Date    ADD (attention deficit disorder) 2014    [facility] psych    Amebic colitis 1970s    Anemia, iron deficiency 05/2018    eval Dr Gisselle Long showed gastritis    Breast cancer ([facility] 02/2016    Dr Dwight Becerra; RIGHT partial mastectomy, adjuvant XRT    Bronchiectasis     Dr. Irais Fenton; RLL    COVID-19 virus infection     12/20; 8/22    DM (diabetes mellitus) ([facility] 09/2013    on basis of elev a1c    Fibrocystic breast disease     Dr. Lo Argueta    GERD (gastroesophageal reflux disease)     Dr. Gisselle Long s/p dilation 2/12    H/O echocardiogram     NST neg, ef 75% (4/14)    H/O pulmonary function tests 2011    ratio 70, FEV1 81 no change postbd, TLC 93, RV 94, DLCO 81    Hyperlipidemia     Insomnia Osteoarthritis     knee s/p cortisone; SI joint on xray     Osteopenia     DEXA t-score -1.2 spine, -0.2 hip ();  spine -1.2, hip 0.1 (10/10);  -1.8 spine, -0.3 FRAX 6.3/0.2 (); [facility] -1.7 spine, 0.1 hip (); -1.7 spine, 0.2 hip ()    Overweight (BMI 25.0-29. 9)     IF  but unwilling to do; phentermine  start weight 145 lbs    Rotator cuff injury     conservative therapy LEFT    Sinus tachycardia     Dr Charissa Miranda    Spontaneous pneumothorax 1970s    x3    Vitamin D deficiency     Zoster right S1 2012     Past Surgical History:   Procedure Laterality Date    HX BREAST BIOPSY      RIGHT breast biopsy (); RIGHT breast lumpectomy ()    HX COLONOSCOPY      Dr. Jm Ash negative ; diverticulosis  Dr Camila Solano; Dr Jm Ash 17 neg    HX ENDOSCOPY  2018    Dr Jm Ash; gastropathy, h pylori neg    [address]    fibroids/ovarian cysts - Dr. 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     Employer: [employer]   Tobacco Use    Smoking status: Former     Packs/day: 1.00     Years: 5.00     Pack years: 5.00     Types: Cigarettes     Quit date: 1973     Years since quittin.1    Smokeless tobacco: Never   Vaping Use    Vaping Use: Never used   Substance and Sexual Activity    Alcohol use: No    Drug use: No    Sexual activity: Not on file   Other Topics Concern    Not on file   Social History Narrative    Not on file     Social Determinants of Health     Financial Resource Strain: Not on file   Food Insecurity: Not on file   Transportation Needs: Not on file   Physical Activity: Not on file   Stress: Not on file   Social Connections: Not on file   Intimate Partner Violence: Not on file   Housing Stability: Not on file     Current Outpatient Medications   Medication Sig    clotrimazole-betamethasone (LOTRISONE) topical cream clotrimazole-betamethasone 1 %-0.05 % topical cream    glipiZIDE SR (GLUCOTROL XL) 2.5 mg CR tablet Take 1 Tablet by mouth daily. pramipexole (Mirapex) 0.25 mg tablet Take 1 Tablet by mouth three (3) times daily. Onglyza 5 mg tab tablet TAKE 1 TABLET BY MOUTH EVERY DAY    diazePAM (VALIUM) 5 mg tablet Take 1 Tablet by mouth every twelve (12) hours as needed for Anxiety. Max Daily Amount: 10 mg.    pantoprazole (PROTONIX) 40 mg tablet Take 1 Tablet by mouth daily. tamoxifen (NOLVADEX) 20 mg tablet Take 1 Tablet by mouth daily. ergocalciferol (ERGOCALCIFEROL) 1,250 mcg (50,000 unit) capsule TAKE 1 CAPSULE BY MOUTH 1 TIME PER WEEK    meclizine (ANTIVERT) 25 mg tablet Take 1 Tablet by mouth three (3) times daily as needed for Dizziness. pravastatin (PRAVACHOL) 40 mg tablet TAKE 1 TABLET BY MOUTH NIGHTLY.    cyanocobalamin 1,000 mcg tablet Take 1,000 mcg by mouth daily. vitamin e (E GEMS) 100 unit capsule Take 100 Units by mouth daily. albuterol (Ventolin HFA) 90 mcg/actuation inhaler Take 2 Puffs by inhalation every six (6) hours as needed for Wheezing. Indications: bronchospasm    zinc sulfate (ZINC-15 PO) Take  by mouth. ferrous sulfate 325 mg (65 mg iron) tablet Take  by mouth Daily (before breakfast). calcium carbonate (CALCIUM 300 PO) Take  by mouth. ADVAIR DISKUS 250-50 mcg/dose diskus inhaler TAKE 1 PUFF BY INHALATION TWO (2) TIMES A DAY. b complex vitamins tablet Take 1 Tab by mouth daily. No current facility-administered medications for this visit.      Allergies   Allergen Reactions    Actos [Pioglitazone] Other (comments)     Felt bad, weight gain    Avelox [Moxifloxacin] Other (comments)     Felt jittery    Crestor [Rosuvastatin] Myalgia    Evista [Raloxifene] Other (comments)     Severe flashes    Jardiance [Empagliflozin] Other (comments)     Recurring yeast infections    Lipitor [Atorvastatin] Other (comments)     Leg cramps and muscle aches    Macrobid [Nitrofurantoin Monohyd/M-Cryst] Hives    Metformin Other (comments)     Gi intolerance    Neuromuscular Blockers, Steroidal Other (comments)     \"patient feels crazy\"    Nsaids (Non-Steroidal Anti-Inflammatory Drug) Other (comments)     H/o gastropathy    Phentermine Other (comments)     Felt bad     REVIEW OF SYSTEMS: mammo 10/21, DEXA 9/21, colo 12/13 Dr Sapna Randle, Dr Gilles Fallon  Ophtho - no vision change or eye pain  Oral - no mouth pain, tongue or tooth problems  Ears - no hearing loss, ear pain, fullness, no swallowing problems  Cardiac - no CP, PND, orthopnea, edema, palpitations or syncope  Chest - no breast masses  Resp - no wheezing, chronic coughing, dyspnea  GI - no heartburn, nausea, vomiting, change in bowel habits, bleeding, hemorrhoids  Urinary - no dysuria, hematuria, flank pain, urgency, frequency    Visit Vitals  /60   Pulse 91   Temp 98 °F (36.7 °C) (Temporal)   Resp 20   Ht 5' 3\" (1.6 m)   Wt 136 lb (61.7 kg)   SpO2 97%   BMI 24.09 kg/m²     A&O x3  Affect is appropriate. Mood stable  No apparent distress  Anicteric, no JVD, adenopathy or thyromegaly. No carotid bruits or radiated murmur  Lungs clear to auscultation, no wheezes or rales  Heart showed regular rate and rhythm. No murmur, rubs, gallops  Abdomen soft nontender, no hepatosplenomegaly or masses. Extremities without edema.   Pulses 1-2+ symmetrically    LABS  From 9/10 showed   gluc 106, cr 0.70, gfr>60, alt 42,                   chol 238, tg 154, hdl 54, ldl-c 153, wbc 6.5, hb 13.1, plt 258, ua neg  From 2/11 showed   gluc 95,   cr 0.90 wbc 7.0, hb 14.1, plt 308  From 3/12 showed   gluc 103, cr 0.90              alt 43,                                                                           wbc 6.2, hb 13.7, plt 285  From 3/13 showed   gluc 130, cr 0.60,             alt 22,                    chol 244, tg 180, hdl 57, ldl-c 151,                                          ua neg,     tsh 1.79  From 9/13 showed   gluc 119, cr 0.70, gfr>60,            hba1c 6.6, chol 228, tg 145, hdl 62, ldl-c 137, 2hr   From 2/14 showed   gluc 95,   cr 0.60, gfr>61, alt 20, hba1c 5.7,                      umar 2.8  From 4/14 showed   gluc 144, cr 0.98, gfr>60, alt 31,            wbc 7.6, hb 13.4, plt 317,                   ck/trop-  From 10/14 showed gluc 98,   cr 0.60, gfr>60, alt 20,           chol 163, tg 96,  hdl 64, ldl-c 80  From 4/15 showed   gluc 96,   cr 0.70, gfr>60, alt 19, hba1c 6.1, chol 207, tg 126, hdl 68, ldl-c 114  From 10/15 showed        hba1c 6.3,  chol 188, tg 118, hdl 66, ldl-c 98,   wbc 5.3, hb 13.3, plt 287, umar neg  From 4/16 showed   gluc 98,   cr 0.60, gfr>60, alt 19, hba1c 6.3, chol 170, tg 141, hdl 57, ldl-c 85  From 10/16 showed gluc 137, cr 0.50, gfr>60, alt 17, hba1c 6.2, chol 176, tg 302, hdl 54, ldl-c 62,   wbc 8.9, hb 12.3, plt 393, umar neg  From 1/17 showed   gluc 114, cr 0.60, gfr>60,             wbc 6.1, hb 12.9, plt 356  From 4/17 showed         hba1c 6.8, chol 223, tg 240, hdl 53, ldl-c 122  From 9/17 showed   gluc 109, cr 0.70, gfr>60, alt 16, hba1c 6.7,                    umar neg  From 1/18 showed   gluc 104, cr 0.60, gfr>60, alt 14, hba1c 6.8, chol 154, tg 197, hdl 60, ldl-c 55  From 5/18 showed         hba1c 7.0,           wbc 5.7, hb 11.3, plt 281, umar neg, fe 43, %sat 11, ferritin 7,   b12 588, fol 18.6, spep neg, retic 1.6  From 9/18 showed   gluc 143, cr 0.70, gfr>60,     hba1c 7.0, chol 155, tg 199, hdl 56, ldl-c 59  From 1/19 showed fe 80, %sat 28, ferritin 28, vit d 37.1  From 5/19 showed   gluc 155, cr 0.70, gfr>60, alt 22, hba1c 7.4, chol 139, tg 172, hdl 49, ldl-c 55,   wbc 6.0, hb 13.3, plt 214, umar neg  From 10/19 showed         hba1c 6.8,                    umar 9.8,  fe 79, %sat 29,             vit d 48.7  From 4/20 showed         hba1c 7.0, chol 142, tg 115, hdl 50, ldl-c 69                   vit d 42.8  From 10/20 showed gluc 85,   cr 0.60, gfr>60, alt 21, hba1c 7.0,           wbc 6.1, hb 13.3, plt 248,                vit d 33.4  From 4/21 showed        hba1c 7.6, chol 163, tg 307, hdl 52, ldl-c 50,   wbc 6.4, hb 14.2, plt 232  From 8/21 showed   gluc 136, cr 0.60, gfr>60, alt 19, hba1c 6.8  From 2/22 showed   gluc 140, cr 0.67, gfr>60,     hba1c 6.7, chol 158, tg 157, hdl 69, ldl-c 58,   wbc 4.9, hb 13.6, plt 253, umar 8  From 8/22 showed   gluc 156, cr 0.64, gfr>60, alt 16, hba1c 7.4    We reviewed the patient's labs from the last several visits to point out trends in the numbers          Patient Active Problem List   Diagnosis Code    Bronchiectasis Dr. Slava Bianchi J47.9    Osteopenia  M85.80    Hyperlipidemia E78.5    Gastroesophageal reflux disease without esophagitis K21.9    Controlled type 2 diabetes mellitus without complication, without long-term current use of insulin (HCC) E11.9    History of breast cancer Z85.3    Vitamin D deficiency E55.9    Overweight (BMI 25.0-29. 9) E66.3     Assessment and plan:  1. Pulm. F/U Dr Helene Sotelo and continue current  2. GERD. Continue ppi and avoidance measures  3. Osteopenia. Ca/d, wt bearing exercises  4. DM. Long discussion. Ideally change to glp from onglyza but concerned about costs;   5. Lipids. Continue prava, vascepa not covered  6  Breast ca. Referred to Dr Arnulfo Serrano  7. Overweight. Lifestyle and dietary measures. congratulated her on the wt loss so far  8. R/o RLS?   Trial mirapex        RTC 8/22    Above conditions discussed at length and patient vocalized understanding. All questions answered to patient satisfaction      ICD-10-CM ICD-9-CM    1. Controlled type 2 diabetes mellitus without complication, without long-term current use of insulin ([facility])  E11.9 250.00 glipiZIDE SR (GLUCOTROL XL) 2.5 mg CR tablet      CBC W/O DIFF      LIPID PANEL      MICROALBUMIN, UR, RAND W/ MICROALB/CREAT RATIO      HEMOGLOBIN A1C WITH EAG      2. Gastroesophageal reflux disease without esophagitis  K21.9 530.81       3. Bronchiectasis without complication ([facility]  H74.5 494.0       4. Hyperlipidemia, unspecified hyperlipidemia type  E78.5 272.4       5. History of breast cancer  Z85.3 V10.3       6. Vitamin D deficiency  E55.9 268.9 VITAMIN D, 25 HYDROXY      7. Restless legs  G25.81 333.94 pramipexole (Mirapex) 0.25 mg tablet      8. Malignant neoplasm of right female breast, unspecified estrogen receptor status, unspecified site of breast ([facility]  C50.911 174.9 REFERRAL TO BREAST SURGERY      9.  Rash  R21 782.1 clotrimazole-betamethasone (LOTRISONE) topical cream

## 2022-08-30 ENCOUNTER — HOSPITAL ENCOUNTER (OUTPATIENT)
Dept: LAB | Age: 72
Discharge: HOME | End: 2022-08-30

## 2022-08-30 DIAGNOSIS — E11.65 UNCONTROLLED TYPE 2 DIABETES MELLITUS WITH HYPERGLYCEMIA (HCC): ICD-10-CM

## 2022-08-30 LAB — XX-LABCORP SPECIMEN COL,LCBCF: NORMAL

## 2022-08-30 PROCEDURE — 99001 SPECIMEN HANDLING PT-LAB: CPT

## 2022-08-31 LAB
ALBUMIN SERPL-MCNC: 4.3 G/DL (ref 3.8–4.8)
ALBUMIN/GLOB SERPL: 2 {RATIO} (ref 1.2–2.2)
ALP SERPL-CCNC: 50 IU/L (ref 44–121)
ALT SERPL-CCNC: 16 IU/L (ref 0–32)
AST SERPL-CCNC: 15 IU/L (ref 0–40)
BILIRUB SERPL-MCNC: 0.2 MG/DL (ref 0–1.2)
BUN SERPL-MCNC: 14 MG/DL (ref 8–27)
BUN/CREAT SERPL: 22 (ref 12–28)
CALCIUM SERPL-MCNC: 9.3 MG/DL (ref 8.7–10.3)
CHLORIDE SERPL-SCNC: 104 MMOL/L (ref 96–106)
CO2 SERPL-SCNC: 24 MMOL/L (ref 20–29)
CREAT SERPL-MCNC: 0.64 MG/DL (ref 0.57–1)
EGFR: 96 ML/MIN/1.73
EST. AVERAGE GLUCOSE BLD GHB EST-MCNC: 166 MG/DL
GLOBULIN SER CALC-MCNC: 2.1 G/DL (ref 1.5–4.5)
GLUCOSE SERPL-MCNC: 156 MG/DL (ref 65–99)
HBA1C MFR BLD: 7.4 % (ref 4.8–5.6)
POTASSIUM SERPL-SCNC: 4.6 MMOL/L (ref 3.5–5.2)
PROT SERPL-MCNC: 6.4 G/DL (ref 6–8.5)
SODIUM SERPL-SCNC: 140 MMOL/L (ref 134–144)

## 2022-09-02 ENCOUNTER — OFFICE VISIT (OUTPATIENT)
Dept: INTERNAL MEDICINE CLINIC | Age: 72
End: 2022-09-02
Payer: COMMERCIAL

## 2022-09-02 VITALS
RESPIRATION RATE: 20 BRPM | OXYGEN SATURATION: 97 % | DIASTOLIC BLOOD PRESSURE: 60 MMHG | SYSTOLIC BLOOD PRESSURE: 133 MMHG | WEIGHT: 136 LBS | HEIGHT: 63 IN | BODY MASS INDEX: 24.1 KG/M2 | TEMPERATURE: 98 F | HEART RATE: 91 BPM

## 2022-09-02 DIAGNOSIS — G25.81 RESTLESS LEGS: ICD-10-CM

## 2022-09-02 DIAGNOSIS — E78.5 HYPERLIPIDEMIA, UNSPECIFIED HYPERLIPIDEMIA TYPE: ICD-10-CM

## 2022-09-02 DIAGNOSIS — J47.9 BRONCHIECTASIS WITHOUT COMPLICATION (HCC): ICD-10-CM

## 2022-09-02 DIAGNOSIS — E55.9 VITAMIN D DEFICIENCY: ICD-10-CM

## 2022-09-02 DIAGNOSIS — C50.911 MALIGNANT NEOPLASM OF RIGHT FEMALE BREAST, UNSPECIFIED ESTROGEN RECEPTOR STATUS, UNSPECIFIED SITE OF BREAST (HCC): ICD-10-CM

## 2022-09-02 DIAGNOSIS — R21 RASH: ICD-10-CM

## 2022-09-02 DIAGNOSIS — K21.9 GASTROESOPHAGEAL REFLUX DISEASE WITHOUT ESOPHAGITIS: ICD-10-CM

## 2022-09-02 DIAGNOSIS — Z85.3 HISTORY OF BREAST CANCER: ICD-10-CM

## 2022-09-02 DIAGNOSIS — E11.9 CONTROLLED TYPE 2 DIABETES MELLITUS WITHOUT COMPLICATION, WITHOUT LONG-TERM CURRENT USE OF INSULIN (HCC): Primary | ICD-10-CM

## 2022-09-02 PROBLEM — C50.912 MALIGNANT NEOPLASM OF UNSPECIFIED SITE OF LEFT FEMALE BREAST (HCC): Status: RESOLVED | Noted: 2022-02-14 | Resolved: 2022-09-02

## 2022-09-02 PROBLEM — C50.919 MALIGNANT NEOPLASM OF UNSPECIFIED SITE OF UNSPECIFIED FEMALE BREAST (HCC): Status: RESOLVED | Noted: 2022-02-14 | Resolved: 2022-09-02

## 2022-09-02 PROCEDURE — 99214 OFFICE O/P EST MOD 30 MIN: CPT | Performed by: INTERNAL MEDICINE

## 2022-09-02 PROCEDURE — 1123F ACP DISCUSS/DSCN MKR DOCD: CPT | Performed by: INTERNAL MEDICINE

## 2022-09-02 PROCEDURE — 3051F HG A1C>EQUAL 7.0%<8.0%: CPT | Performed by: INTERNAL MEDICINE

## 2022-09-02 NOTE — PROGRESS NOTES
Chago Lagos presents with   Chief Complaint   Patient presents with    Follow-up     6 month    Cholesterol Problem    Labs     8-27-22                1. \"Have you been to the ER, urgent care clinic since your last visit? Hospitalized since your last visit? \" No    2. \"Have you seen or consulted any other health care providers outside of the [address] [place] [address] since your last visit? \" No     3. For patients aged 39-70: Has the patient had a colonoscopy / FIT/ Cologuard? Yes - no Care Gap present      If the patient is female:    4. For patients aged 41-77: Has the patient had a mammogram within the past 2 years? Yes - no Care Gap present      5. For patients aged 21-65: Has the patient had a pap smear?  NA - based on age or sex

## 2022-09-05 RX ORDER — PRAMIPEXOLE DIHYDROCHLORIDE 0.25 MG/1
0.25 TABLET ORAL 3 TIMES DAILY
Qty: 90 TABLET | Refills: 3 | Status: SHIPPED | OUTPATIENT
Start: 2022-09-05

## 2022-09-05 RX ORDER — GLIPIZIDE 2.5 MG/1
2.5 TABLET, EXTENDED RELEASE ORAL DAILY
Qty: 30 TABLET | Refills: 5 | Status: SHIPPED | OUTPATIENT
Start: 2022-09-05

## 2022-09-05 RX ORDER — CLOTRIMAZOLE AND BETAMETHASONE DIPROPIONATE 10; .64 MG/G; MG/G
CREAM TOPICAL
Qty: 30 G | Refills: 1 | Status: SHIPPED | OUTPATIENT
Start: 2022-09-05

## 2022-10-14 DIAGNOSIS — F41.9 ANXIETY: ICD-10-CM

## 2022-10-14 NOTE — TELEPHONE ENCOUNTER
[facility] as 8/27/22 for a 20 d/s. Last Visit: 9/2/22 with MD Oliva Dorman  Next Appointment: 1/5/23 with MD Oliva Dorman  Previous Refill Encounter(s): 7/20/22 #40 with 1 refill    Requested Prescriptions     Pending Prescriptions Disp Refills    diazePAM (VALIUM) 5 mg tablet 40 Tablet 1     Sig: Take 1 Tablet by mouth every twelve (12) hours as needed for Anxiety. Max Daily Amount: 10 mg. For [facility] in place:   Recommendation Provided To:    Intervention Detail: New Rx: 1, reason: Patient Preference  Gap Closed?:   Intervention Accepted By:   Time Spent (min): 5

## 2022-10-15 RX ORDER — DIAZEPAM 5 MG/1
5 TABLET ORAL
Qty: 40 TABLET | Refills: 1 | Status: SHIPPED | OUTPATIENT
Start: 2022-10-15

## 2022-12-06 DIAGNOSIS — E78.5 HYPERLIPIDEMIA, UNSPECIFIED HYPERLIPIDEMIA TYPE: ICD-10-CM

## 2022-12-07 RX ORDER — PRAVASTATIN SODIUM 40 MG/1
TABLET ORAL
Qty: 90 TABLET | Refills: 3 | Status: SHIPPED | OUTPATIENT
Start: 2022-12-07

## 2022-12-20 DIAGNOSIS — F41.9 ANXIETY: ICD-10-CM

## 2022-12-20 NOTE — TELEPHONE ENCOUNTER
Requested Prescriptions     Pending Prescriptions Disp Refills    diazePAM (VALIUM) 5 mg tablet 40 Tablet 1     Sig: Take 1 Tablet by mouth every twelve (12) hours as needed for Anxiety. Max Daily Amount: 10 mg.

## 2022-12-20 NOTE — TELEPHONE ENCOUNTER
[facility]20[facility]2022    The last fill date for diazepam was 11/17/2022 for a 20 d/s qty 40      Last UDS: not on file    CSA Last signed: not on file        PCP: Amber Colunga MD    Last appt: 9/2/2022  Future Appointments   Date Time Provider Grant Natarajan   12/30/2022  9:15 AM IO LAB VISIT IO BS AMB   1/5/2023  8:00 AM Alyson Wang MD [facility] BS AMB       Requested Prescriptions     Pending Prescriptions Disp Refills    diazePAM (VALIUM) 5 mg tablet 40 Tablet 1     Sig: Take 1 Tablet by mouth every twelve (12) hours as needed for Anxiety. Max Daily Amount: 10 mg.

## 2022-12-21 RX ORDER — DIAZEPAM 5 MG/1
5 TABLET ORAL
Qty: 40 TABLET | Refills: 1 | Status: SHIPPED | OUTPATIENT
Start: 2022-12-21

## 2022-12-28 ENCOUNTER — HOSPITAL ENCOUNTER (OUTPATIENT)
Dept: LAB | Age: 72
Discharge: HOME | End: 2022-12-28

## 2022-12-28 LAB — XX-LABCORP SPECIMEN COL,LCBCF: NORMAL

## 2022-12-28 PROCEDURE — 99001 SPECIMEN HANDLING PT-LAB: CPT

## 2022-12-28 NOTE — PROGRESS NOTES
Kari Marie presents today for   Chief Complaint   Patient presents with    Follow-up    Labs     12-28-22    Diabetes     Type 2, controlled    Hyperlipidemia    Vitamin D Deficiency     1. \"Have you been to the ER, urgent care clinic since your last visit? Hospitalized since your last visit? \" no    2. \"Have you seen or consulted any other health care providers outside of the [address] [place] [address] since your last visit? \" yes     3. For patients aged 39-70: Has the patient had a colonoscopy / FIT/ Cologuard? Yes - no Care Gap present      If the patient is female:    4. For patients aged 41-77: Has the patient had a mammogram within the past 2 years? Yes - no Care Gap present  See top three    5. For patients aged 21-65: Has the patient had a pap smear?  NA - based on age or sex

## 2022-12-29 NOTE — PROGRESS NOTES
81 y.o. WHITE/NON- female who presents for RPE    She continues to tamox for RIGHT breast ca and is seeing Dr Sujatha Holguin to do at least 6-8k steps daily on her counter, no cardiovascular complaints. Still having intermittent HR variability between the 60s to 110s on her fitbit but asymptomatic. She had evaluation for this by Dr Tafoya Ill back in 2018 timeframe which was unrevealing of anything pathologic     Denies polyuria, polydipsia, nocturia, vision change. Not checking sugars. She is intol of actos, metformin jardiance as below and a1c still climbing along with the weight. Vitals 1/5/2023 9/2/2022 2/14/2022 2/14/2022 1/28/2022   Weight 143 lb 136 lb  131 lb 129 lb 3.2 oz     Vitals 1/3/2022   Weight 126 lb 12.8 oz     Remains on PPI and iron supplementation, no GI complaints to report    She has not seen Dr. Choe Hunt for bronchiectasis of late, remains on Advair as needed.   She had another covid infection and refusing to take any more boosters    LAST MEDICARE WELLNESS EXAM: never as not medicare b    Past Medical History:   Diagnosis Date    ADD (attention deficit disorder) 2014    [facility] psych    Amebic colitis 1970s    Anemia, iron deficiency 05/2018    eval Dr Lemuel Weaver showed gastritis    Breast cancer ([facility] [address] 02/2016    Dr Gin Hand; RIGHT partial mastectomy, adjuvant XRT    Bronchiectasis     Dr. Aly Gallego; RLL    COVID-19 vaccine dose declined     boosters    COVID-19 virus infection     12/20; 8/22; 10/22    DM (diabetes mellitus) ([facility] [address] 09/2013    on basis of elev a1c    Fibrocystic breast disease     Dr. Olamide Stevens    GERD (gastroesophageal reflux disease)     Dr. Lemuel Weaver s/p dilation 2/12    H/O echocardiogram     NST neg, ef 75% (4/14)    H/O pulmonary function tests 2011    ratio 70, FEV1 81 no change postbd, TLC 93, RV 94, DLCO 81    Hyperlipidemia     Insomnia     Osteoarthritis     knee s/p cortisone; SI joint on xray 7/21    Osteopenia     DEXA t-score -1.2 spine, -0.2 hip (4/08); spine -1.2, hip 0.1 (10/10);  -1.8 spine, -0.3 FRAX 6.3/0.2 (); [facility] -1.7 spine, 0.1 hip (); -1.7 spine, 0.2 hip ()    Overweight (BMI 25.0-29. 9)     IF  but unwilling to do; phentermine  start weight 145 lbs    Rotator cuff injury     conservative therapy LEFT    Sinus tachycardia     Dr Sharath Cook    Spontaneous pneumothorax 1970s    x3    Vitamin D deficiency     Zoster right S1 2012     Past Surgical History:   Procedure Laterality Date    HX BREAST BIOPSY      RIGHT breast biopsy (); RIGHT breast lumpectomy ()    HX COLONOSCOPY      Dr. Debra Mendoza negative ; diverticulosis  Dr Michelel Peace; Dr Debra Mendoza 17 neg    HX ENDOSCOPY  2018    Dr Debra Mendoza; gastropathy, h pylori neg    [facility] [place]    fibroids/ovarian cysts - Dr. 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     Employer: [employer]   Tobacco Use    Smoking status: Former     Packs/day: 1.00     Years: 5.00     Pack years: 5.00     Types: Cigarettes     Quit date: 1973     Years since quittin.4    Smokeless tobacco: Never   Vaping Use    Vaping Use: Never used   Substance and Sexual Activity    Alcohol use: No    Drug use: No    Sexual activity: Not on file   Other Topics Concern    Not on file   Social History Narrative    Not on file     Social Determinants of Health     Financial Resource Strain: Not on file   Food Insecurity: Not on file   Transportation Needs: Not on file   Physical Activity: Not on file   Stress: Not on file   Social Connections: Not on file   Intimate Partner Violence: Not on file   Housing Stability: Not on file     Current Outpatient Medications   Medication Sig    semaglutide (OZEMPIC) 0.25 mg or 0.5 mg/dose (2 mg/1.5 ml) subq pen 0.25 mg by SubCUTAneous route every seven (7) days. diazePAM (VALIUM) 5 mg tablet Take 1 Tablet by mouth every twelve (12) hours as needed for Anxiety. Max Daily Amount: 10 mg.    pravastatin (PRAVACHOL) 40 mg tablet TAKE 1 TABLET BY MOUTH EVERY DAY AT NIGHT    glipiZIDE SR (GLUCOTROL XL) 2.5 mg CR tablet Take 1 Tablet by mouth daily. pantoprazole (PROTONIX) 40 mg tablet Take 1 Tablet by mouth daily. ergocalciferol (ERGOCALCIFEROL) 1,250 mcg (50,000 unit) capsule TAKE 1 CAPSULE BY MOUTH 1 TIME PER WEEK    meclizine (ANTIVERT) 25 mg tablet Take 1 Tablet by mouth three (3) times daily as needed for Dizziness. cyanocobalamin 1,000 mcg tablet Take 1,000 mcg by mouth daily. vitamin e (E GEMS) 100 unit capsule Take 100 Units by mouth daily. albuterol (Ventolin HFA) 90 mcg/actuation inhaler Take 2 Puffs by inhalation every six (6) hours as needed for Wheezing. Indications: bronchospasm    zinc sulfate (ZINC-15 PO) Take  by mouth.    calcium carbonate (CALCIUM 300 PO) Take  by mouth.    b complex vitamins tablet Take 1 Tab by mouth daily. raloxifene (EVISTA) 60 mg tablet Take 60 mg by mouth daily. No current facility-administered medications for this visit.      Allergies   Allergen Reactions    Actos [Pioglitazone] Other (comments)     Felt bad, weight gain    Avelox [Moxifloxacin] Other (comments)     Felt jittery    Crestor [Rosuvastatin] Myalgia    Evista [Raloxifene] Other (comments)     Severe flashes    Jardiance [Empagliflozin] Other (comments)     Recurring yeast infections    Lipitor [Atorvastatin] Other (comments)     Leg cramps and muscle aches    Macrobid [Nitrofurantoin Monohyd/M-Cryst] Hives    Metformin Other (comments)     Gi intolerance    Neuromuscular Blockers, Steroidal Other (comments)     \"patient feels crazy\"    Nsaids (Non-Steroidal Anti-Inflammatory Drug) Other (comments)     H/o gastropathy    Phentermine Other (comments)     Felt bad     REVIEW OF SYSTEMS: mammo 11/22, DEXA 9/21, colo 12/17 Dr Adriana Alberts, Dr Tiffanie Hayden - no vision change or eye pain  Oral - no mouth pain, tongue or tooth problems  Ears - no hearing loss, ear pain, fullness, no swallowing problems  Cardiac - no CP, PND, orthopnea, edema, palpitations or syncope  Chest - no breast masses  Resp - no wheezing, chronic coughing, dyspnea  GI - no heartburn, nausea, vomiting, change in bowel habits, bleeding, hemorrhoids  Urinary - no dysuria, hematuria, flank pain, urgency, frequency    Visit Vitals  /69   Pulse 98   Temp 97.7 °F (36.5 °C) (Temporal)   Resp 18   Ht 5' 3\" (1.6 m)   Wt 143 lb (64.9 kg)   SpO2 95%   BMI 25.33 kg/m²   A&O x3  Affect is appropriate. Mood stable  No apparent distress  Anicteric, no JVD, adenopathy or thyromegaly. No carotid bruits or radiated murmur  Lungs clear to auscultation, no wheezes or rales  Heart showed regular rate and rhythm. No murmur, rubs, gallops  Abdomen soft nontender, no hepatosplenomegaly or masses. Extremities without edema.   Pulses 1-2+ symmetrically    LABS  From 9/10 showed   gluc 106, cr 0.70, gfr>60, alt 42,                   chol 238, tg 154, hdl 54, ldl-c 153, wbc 6.5, hb 13.1, plt 258, ua neg  From 2/11 showed   gluc 95,   cr 0.90                                                                                                   wbc 7.0, hb 14.1, plt 308  From 3/12 showed   gluc 103, cr 0.90              alt 43,                                                                           wbc 6.2, hb 13.7, plt 285  From 3/13 showed   gluc 130, cr 0.60,             alt 22,                    chol 244, tg 180, hdl 57, ldl-c 151,                                          ua neg,     tsh 1.79  From 9/13 showed   gluc 119, cr 0.70, gfr>60,            hba1c 6.6, chol 228, tg 145, hdl 62, ldl-c 137, 2hr   From 2/14 showed   gluc 95,   cr 0.60, gfr>61, alt 20, hba1c 5.7,                      umar 2.8  From 4/14 showed   gluc 144, cr 0.98, gfr>60, alt 31,            wbc 7.6, hb 13.4, plt 317,                   ck/trop-  From 10/14 showed gluc 98,   cr 0.60, gfr>60, alt 20,           chol 163, tg 96,  hdl 64, ldl-c 80  From 4/15 showed   gluc 96,   cr 0.70, gfr>60, alt 19, hba1c 6.1, chol 207, tg 126, hdl 68, ldl-c 114  From 10/15 showed        hba1c 6.3,  chol 188, tg 118, hdl 66, ldl-c 98,   wbc 5.3, hb 13.3, plt 287, umar neg  From 4/16 showed   gluc 98,   cr 0.60, gfr>60, alt 19, hba1c 6.3, chol 170, tg 141, hdl 57, ldl-c 85  From 10/16 showed gluc 137, cr 0.50, gfr>60, alt 17, hba1c 6.2, chol 176, tg 302, hdl 54, ldl-c 62,   wbc 8.9, hb 12.3, plt 393, umar neg  From 1/17 showed   gluc 114, cr 0.60, gfr>60,             wbc 6.1, hb 12.9, plt 356  From 4/17 showed         hba1c 6.8, chol 223, tg 240, hdl 53, ldl-c 122  From 9/17 showed   gluc 109, cr 0.70, gfr>60, alt 16, hba1c 6.7,                    umar neg  From 1/18 showed   gluc 104, cr 0.60, gfr>60, alt 14, hba1c 6.8, chol 154, tg 197, hdl 60, ldl-c 55  From 5/18 showed         hba1c 7.0,           wbc 5.7, hb 11.3, plt 281, umar neg, fe 43, %sat 11, ferritin 7,   b12 588, fol 18.6, spep neg, retic 1.6  From 9/18 showed   gluc 143, cr 0.70, gfr>60,     hba1c 7.0, chol 155, tg 199, hdl 56, ldl-c 59  From 1/19 showed                                fe 80, %sat 28, ferritin 28, vit d 37.1  From 5/19 showed   gluc 155, cr 0.70, gfr>60, alt 22, hba1c 7.4, chol 139, tg 172, hdl 49, ldl-c 55,   wbc 6.0, hb 13.3, plt 214, umar neg  From 10/19 showed         hba1c 6.8,                    umar 9.8,  fe 79, %sat 29,             vit d 48.7  From 4/20 showed         hba1c 7.0, chol 142, tg 115, hdl 50, ldl-c 69                   vit d 42.8  From 10/20 showed gluc 85,   cr 0.60, gfr>60, alt 21, hba1c 7.0,           wbc 6.1, hb 13.3, plt 248,                vit d 33.4  From 4/21 showed        hba1c 7.6, chol 163, tg 307, hdl 52, ldl-c 50,   wbc 6.4, hb 14.2, plt 232  From 8/21 showed   gluc 136, cr 0.60, gfr>60, alt 19, hba1c 6.8  From 2/22 showed   gluc 140, cr 0.67, gfr>60,     hba1c 6.7, chol 158, tg 157, hdl 69, ldl-c 58,   wbc 4.9, hb 13.6, plt 253, umar 8  From 8/22 showed   gluc 156, cr 0.64, gfr>60, alt 16, hba1c 7.4  From 12/22 showed         hba1c 7.6, chol 162, tg 221, hdl 54, ldl-c 72,   wbc 6.9, hb 14.1, plt 248, umar 5,     vit d 41.6    We reviewed the patient's labs from the last several visits to point out trends in the numbers          Patient Active Problem List   Diagnosis Code    Bronchiectasis Dr. Tony García J47.9    Osteopenia  M85.80    Hyperlipidemia E78.5    Gastroesophageal reflux disease without esophagitis K21.9    History of breast cancer Z85.3    Vitamin D deficiency E55.9    Overweight (BMI 25.0-29. 9) E66.3    Uncontrolled type 2 diabetes mellitus with hyperglycemia (HCC) E11.65     Assessment and plan:  1. Pulm. F/U Dr Aram Faulkner and continue current  2. GERD. Continue ppi and avoidance measures  3. Osteopenia. Ca/d, wt bearing exercises  4. DM. Long discussion. Change to glp from onglyza; benefits and sfx discussed at length, follow up 4 mos  5. Lipids. Continue prava, vascepa not covered  6  Breast ca. Follow up Dr Bryan Cleary  7. Overweight. Lifestyle and dietary measures, hopefully glp will help        RTC 5/23    Above conditions discussed at length and patient vocalized understanding. All questions answered to patient satisfaction      ICD-10-CM ICD-9-CM    1. Physical exam  Z00.00 V70.9       2. Uncontrolled type 2 diabetes mellitus with hyperglycemia (HCC)  E11.65 250.02 semaglutide (OZEMPIC) 0.25 mg or 0.5 mg/dose (2 mg/1.5 ml) subq pen      METABOLIC PANEL, COMPREHENSIVE      HEMOGLOBIN A1C WITH EAG      3.  Bronchiectasis without complication ([facility]  N32.8 494.0       4. Hyperlipidemia, unspecified hyperlipidemia type  E78.5 272.4       5.  Gastroesophageal reflux disease without esophagitis  K21.9 530.81

## 2023-01-05 ENCOUNTER — TELEPHONE (OUTPATIENT)
Dept: INTERNAL MEDICINE CLINIC | Age: 73
End: 2023-01-05

## 2023-01-05 ENCOUNTER — OFFICE VISIT (OUTPATIENT)
Dept: INTERNAL MEDICINE CLINIC | Age: 73
End: 2023-01-05
Payer: COMMERCIAL

## 2023-01-05 VITALS
SYSTOLIC BLOOD PRESSURE: 139 MMHG | TEMPERATURE: 97.7 F | RESPIRATION RATE: 18 BRPM | WEIGHT: 143 LBS | HEART RATE: 98 BPM | DIASTOLIC BLOOD PRESSURE: 69 MMHG | HEIGHT: 63 IN | OXYGEN SATURATION: 95 % | BODY MASS INDEX: 25.34 KG/M2

## 2023-01-05 DIAGNOSIS — J47.9 BRONCHIECTASIS WITHOUT COMPLICATION (HCC): ICD-10-CM

## 2023-01-05 DIAGNOSIS — E78.5 HYPERLIPIDEMIA, UNSPECIFIED HYPERLIPIDEMIA TYPE: ICD-10-CM

## 2023-01-05 DIAGNOSIS — Z00.00 PHYSICAL EXAM: Primary | ICD-10-CM

## 2023-01-05 DIAGNOSIS — K21.9 GASTROESOPHAGEAL REFLUX DISEASE WITHOUT ESOPHAGITIS: ICD-10-CM

## 2023-01-05 DIAGNOSIS — E11.65 UNCONTROLLED TYPE 2 DIABETES MELLITUS WITH HYPERGLYCEMIA (HCC): ICD-10-CM

## 2023-01-05 PROBLEM — E11.9 CONTROLLED TYPE 2 DIABETES MELLITUS WITHOUT COMPLICATION, WITHOUT LONG-TERM CURRENT USE OF INSULIN (HCC): Status: RESOLVED | Noted: 2017-05-03 | Resolved: 2023-01-05

## 2023-01-05 PROCEDURE — 99397 PER PM REEVAL EST PAT 65+ YR: CPT | Performed by: INTERNAL MEDICINE

## 2023-01-05 RX ORDER — RALOXIFENE HYDROCHLORIDE 60 MG/1
60 TABLET, FILM COATED ORAL DAILY
COMMUNITY
Start: 2022-11-23

## 2023-01-05 NOTE — TELEPHONE ENCOUNTER
----- Message from Sabrina Kendall MD sent at 1/5/2023  8:50 AM EST -----  Pls call pt  Forgot to tell her to stop the onglyza while on this - similar drug so no need to be on it

## 2023-01-09 ENCOUNTER — TELEPHONE (OUTPATIENT)
Dept: INTERNAL MEDICINE CLINIC | Age: 73
End: 2023-01-09

## 2023-01-09 NOTE — TELEPHONE ENCOUNTER
Patient called and states her insurance, [employer], told her they need a prior auth for the Ozempic. They gave her a number that would make it quicker , [phone].     Please advise, thank you

## 2023-01-16 LAB — DIABETIC RETINOPATHY: NEGATIVE

## 2023-02-03 DIAGNOSIS — E11.9 CONTROLLED TYPE 2 DIABETES MELLITUS WITHOUT COMPLICATION, WITHOUT LONG-TERM CURRENT USE OF INSULIN (HCC): Primary | ICD-10-CM

## 2023-02-04 DIAGNOSIS — E11.65 UNCONTROLLED TYPE 2 DIABETES MELLITUS WITH HYPERGLYCEMIA (HCC): Primary | ICD-10-CM

## 2023-02-05 DIAGNOSIS — E11.9 CONTROLLED TYPE 2 DIABETES MELLITUS WITHOUT COMPLICATION, WITHOUT LONG-TERM CURRENT USE OF INSULIN (HCC): Primary | ICD-10-CM

## 2023-02-06 DIAGNOSIS — E11.9 CONTROLLED TYPE 2 DIABETES MELLITUS WITHOUT COMPLICATION, WITHOUT LONG-TERM CURRENT USE OF INSULIN (HCC): Primary | ICD-10-CM

## 2023-02-07 DIAGNOSIS — E11.9 CONTROLLED TYPE 2 DIABETES MELLITUS WITHOUT COMPLICATION, WITHOUT LONG-TERM CURRENT USE OF INSULIN (HCC): Primary | ICD-10-CM

## 2023-02-07 DIAGNOSIS — E55.9 VITAMIN D DEFICIENCY: Primary | ICD-10-CM

## 2023-02-15 NOTE — TELEPHONE ENCOUNTER
Slows down gut motility so yes, nausea can be seen as side effect. Is it not getting better? Any abd pain?

## 2023-02-15 NOTE — TELEPHONE ENCOUNTER
Pt states she was prescribed Ozempic  5 weeks ago  she was put on the lowest dose she said she is nauseous and asking is that normal? She has one pen left and asking if Dr wants her to continue with it please advise

## 2023-02-16 RX ORDER — SEMAGLUTIDE 1.34 MG/ML
INJECTION, SOLUTION SUBCUTANEOUS
Qty: 4 ADJUSTABLE DOSE PRE-FILLED PEN SYRINGE | Refills: 2 | Status: SHIPPED | OUTPATIENT
Start: 2023-02-16

## 2023-02-16 RX ORDER — SEMAGLUTIDE 1.34 MG/ML
INJECTION, SOLUTION SUBCUTANEOUS
COMMUNITY
Start: 2023-01-11 | End: 2023-02-16 | Stop reason: SDUPTHER

## 2023-02-16 NOTE — TELEPHONE ENCOUNTER
Patient is aware of message below and verbalized understanding. Refill request pended below. No further questions or concerns from patient at this time.

## 2023-02-16 NOTE — TELEPHONE ENCOUNTER
Called and spoke with patient she reports having several health issues lately so its hard to know what is causing what issues. She reports having an increased headache and flu like symptoms, but she has also had an abcess tooth and was contributing the feeling awful to that. She reports taking her injection on Fridays and on Saturdays and Sundays she just feels bad the nausea reminds her of morning sickness and then eventually subsides. She denies any increased abdominal pain but she has been having some diarrhea. She has not increased her dosing she has continued with the 0.25mg dosing each time she has taken a dose. She is concerned about increasing because of how she feels on this dosage. She will take her last injection this week she wants to know should she try to increase and see how she tolerates or just continue with 0.25mg. Either way after this week she will need a new RX.

## 2023-02-27 DIAGNOSIS — F41.9 ANXIETY: Primary | ICD-10-CM

## 2023-02-27 RX ORDER — DIAZEPAM 5 MG/1
5 TABLET ORAL EVERY 12 HOURS PRN
Qty: 40 TABLET | Refills: 0 | Status: SHIPPED | OUTPATIENT
Start: 2023-02-27 | End: 2023-03-29

## 2023-02-27 RX ORDER — LANCETS 30 GAUGE
EACH MISCELLANEOUS
Qty: 100 EACH | Refills: 5 | Status: SHIPPED | OUTPATIENT
Start: 2023-02-27

## 2023-02-27 RX ORDER — GLUCOSAMINE HCL/CHONDROITIN SU 500-400 MG
CAPSULE ORAL
Qty: 200 STRIP | Refills: 11 | Status: SHIPPED | OUTPATIENT
Start: 2023-02-27

## 2023-02-27 NOTE — TELEPHONE ENCOUNTER
[facility] 02-     The last fill date was 12/21/2022 for a 30 d/s qty 40        Last UDS: unknown     CSA Last signed: not on file. PCP: Alisa Fay MD    Last appt: [unfilled]  Future Appointments   Date Time Provider Betito Gutiérrez   5/4/2023  8:30 AM [facility] LAB VISIT [facility] BS AMB   5/11/2023  8:20 AM Maury Salomon MD [facility] BS AMB       Requested Prescriptions     Pending Prescriptions Disp Refills    diazePAM (VALIUM) 5 MG tablet 40 tablet 0     Sig: Take 1 tablet by mouth every 12 hours as needed for Anxiety for up to 30 days. Max Daily Amount: 10 mg    blood glucose monitor kit and supplies 1 kit 0     Sig: Dispense sufficient amount for indicated testing frequency plus additional to accommodate PRN testing needs. Dispense all needed supplies to include: monitor, strips, lancing device, lancets, control solutions, alcohol swabs. blood glucose monitor strips 200 strip 11     Sig: Test 2 times a day & as needed for symptoms of irregular blood glucose. Dispense sufficient amount for indicated testing frequency plus additional to accommodate PRN testing needs. Lancets MISC 100 each 5     Sig: Dispense lancets covered by patient's insurance.

## 2023-03-07 DIAGNOSIS — E11.9 TYPE 2 DIABETES MELLITUS WITHOUT COMPLICATIONS (HCC): ICD-10-CM

## 2023-03-08 RX ORDER — GLIPIZIDE 2.5 MG/1
TABLET, EXTENDED RELEASE ORAL
Qty: 90 TABLET | Refills: 1 | Status: SHIPPED | OUTPATIENT
Start: 2023-03-08

## 2023-03-09 RX ORDER — ALBUTEROL SULFATE 90 UG/1
2 AEROSOL, METERED RESPIRATORY (INHALATION) EVERY 6 HOURS PRN
Qty: 18 G | Refills: 0 | Status: SHIPPED | OUTPATIENT
Start: 2023-03-09

## 2023-03-09 RX ORDER — AZITHROMYCIN 250 MG/1
250 TABLET, FILM COATED ORAL SEE ADMIN INSTRUCTIONS
Qty: 6 TABLET | Refills: 0 | Status: SHIPPED | OUTPATIENT
Start: 2023-03-09 | End: 2023-03-14

## 2023-03-09 RX ORDER — BENZONATATE 200 MG/1
200 CAPSULE ORAL 3 TIMES DAILY PRN
Qty: 21 CAPSULE | Refills: 0 | Status: SHIPPED | OUTPATIENT
Start: 2023-03-09 | End: 2023-03-16

## 2023-03-09 RX ORDER — FLUTICASONE PROPIONATE AND SALMETEROL 250; 50 UG/1; UG/1
POWDER RESPIRATORY (INHALATION)
Qty: 60 EACH | Refills: 0 | Status: SHIPPED | OUTPATIENT
Start: 2023-03-09

## 2023-03-09 NOTE — TELEPHONE ENCOUNTER
NURSE TRIAGE -  pt called stating  she has developed a cough , it is causing heaviness in her chest along with a sore area in her lungs .SHEHERS is concerned it could be start of pneumonia   Please advise

## 2023-03-09 NOTE — TELEPHONE ENCOUNTER
Patient contacted and advised. Patient is requesting her inhalers be refilled, as they've . PCP: Carlos Garcia MD    Last appt: [unfilled]  Future Appointments   Date Time Provider Betito Gutiérrez   2023  8:30 AM [facility] LAB VISIT [facility] BS AMB   2023  8:20 AM Nusrat Beauchamp MD [facility] BS AMB       Requested Prescriptions     Pending Prescriptions Disp Refills    albuterol sulfate HFA (PROVENTIL;VENTOLIN;PROAIR) 108 (90 Base) MCG/ACT inhaler 18 g 0     Sig: Inhale 2 puffs into the lungs every 6 hours as needed for Wheezing    fluticasone-salmeterol (ADVAIR DISKUS) 250-50 MCG/ACT AEPB diskus inhaler 60 each 0     Sig: TAKE 1 PUFF BY INHALATION TWO (2) TIMES A DAY.      Signed Prescriptions Disp Refills    azithromycin (ZITHROMAX) 250 MG tablet 6 tablet 0     Sig: Take 1 tablet by mouth See Admin Instructions for 5 days 500mg on day 1 followed by 250mg on days 2 - 5     Authorizing Provider: JASMINE BECKFORD    benzonatate (TESSALON) 200 MG capsule 21 capsule 0     Sig: Take 1 capsule by mouth 3 times daily as needed for Cough     Authorizing Provider: JASMINE BECKFORD

## 2023-03-31 DIAGNOSIS — F41.9 ANXIETY: ICD-10-CM

## 2023-03-31 RX ORDER — DIAZEPAM 5 MG/1
5 TABLET ORAL EVERY 12 HOURS PRN
Qty: 40 TABLET | Refills: 0 | Status: SHIPPED | OUTPATIENT
Start: 2023-03-31 | End: 2023-04-30

## 2023-03-31 NOTE — TELEPHONE ENCOUNTER
Requested refill:    - diazePAM (VALIUM) 5 MG tablet    Pharmacy: [facility] on [address]

## 2023-03-31 NOTE — TELEPHONE ENCOUNTER
[facility]  report reviewed      The last fill date was 02-27-23 for a 20 d/s qty 40        Last UDS: unknown      CSA Last signed: unknown       PCP: Fransisco Walters MD    Last appt: [unfilled]  Future Appointments   Date Time Provider Betito Gonzalezi   5/4/2023  8:30 AM [facility] LAB VISIT [facility] BS AMB   5/11/2023  8:20 AM Karrie Osgood, MD [facility] BS AMB       Requested Prescriptions     Pending Prescriptions Disp Refills    diazePAM (VALIUM) 5 MG tablet 40 tablet 0     Sig: Take 1 tablet by mouth every 12 hours as needed for Anxiety for up to 30 days.  Max Daily Amount: 10 mg

## 2023-04-27 ENCOUNTER — TELEMEDICINE (OUTPATIENT)
Age: 73
End: 2023-04-27
Payer: COMMERCIAL

## 2023-04-27 DIAGNOSIS — J06.9 UPPER RESPIRATORY TRACT INFECTION, UNSPECIFIED TYPE: Primary | ICD-10-CM

## 2023-04-27 PROCEDURE — 99213 OFFICE O/P EST LOW 20 MIN: CPT | Performed by: NURSE PRACTITIONER

## 2023-04-27 PROCEDURE — 1123F ACP DISCUSS/DSCN MKR DOCD: CPT | Performed by: NURSE PRACTITIONER

## 2023-04-27 RX ORDER — DOXYCYCLINE 100 MG/1
100 CAPSULE ORAL 2 TIMES DAILY
Qty: 14 CAPSULE | Refills: 0 | Status: SHIPPED | OUTPATIENT
Start: 2023-04-27 | End: 2023-05-04

## 2023-04-27 SDOH — ECONOMIC STABILITY: HOUSING INSECURITY
IN THE LAST 12 MONTHS, WAS THERE A TIME WHEN YOU DID NOT HAVE A STEADY PLACE TO SLEEP OR SLEPT IN A SHELTER (INCLUDING NOW)?: NO

## 2023-04-27 SDOH — ECONOMIC STABILITY: INCOME INSECURITY: HOW HARD IS IT FOR YOU TO PAY FOR THE VERY BASICS LIKE FOOD, HOUSING, MEDICAL CARE, AND HEATING?: NOT HARD AT ALL

## 2023-04-27 SDOH — ECONOMIC STABILITY: FOOD INSECURITY: WITHIN THE PAST 12 MONTHS, THE FOOD YOU BOUGHT JUST DIDN'T LAST AND YOU DIDN'T HAVE MONEY TO GET MORE.: NEVER TRUE

## 2023-04-27 SDOH — ECONOMIC STABILITY: FOOD INSECURITY: WITHIN THE PAST 12 MONTHS, YOU WORRIED THAT YOUR FOOD WOULD RUN OUT BEFORE YOU GOT MONEY TO BUY MORE.: NEVER TRUE

## 2023-04-27 ASSESSMENT — PATIENT HEALTH QUESTIONNAIRE - PHQ9
SUM OF ALL RESPONSES TO PHQ QUESTIONS 1-9: 0
SUM OF ALL RESPONSES TO PHQ9 QUESTIONS 1 & 2: 0
1. LITTLE INTEREST OR PLEASURE IN DOING THINGS: 0
2. FEELING DOWN, DEPRESSED OR HOPELESS: 0

## 2023-04-27 NOTE — PROGRESS NOTES
[facility] [address], [address]  [phone] [facility]/[phone] fax    4/27/2023    HPI:   Ernesto Wall 1950 is a pleasant White (non-) female who presents virtually today for routine follow up. Todays concern/HPI:  Allergies flared 5 weeks ago. Woke up this am with achiness all over body. No fever. No chest pain, just heaviness, denies SOB. Rattling cough. Coughing up some greenish congestion. No GI/ issues. Seeking ABX, Zpack not effective. Melvi Love back in March. Feels didn't clear up infection. Has not seen pulm in sometime. Dr Vaishali Voss was going to do CXR back in March but forgot to order. Not Covid tested. This sx occur every season. Usually 2 times per year. Review of Systems - Negative except above      Past Medical History:   Diagnosis Date    ADD (attention deficit disorder) 2014    [facility] psych    Amebic colitis 1970s    Anemia, iron deficiency 05/2018    eval Dr Rosi Saba showed gastritis    Breast cancer ([facility] [address] 02/2016    Dr Jaylon Zuniga; RIGHT partial mastectomy, adjuvant XRT    Bronchiectasis ([facility] [address]     Dr. Ciera Mendez; RLL    COVID-19 vaccine dose declined     boosters    COVID-19 virus infection     12/20; 8/22; 10/22    DM (diabetes mellitus) ([facility] [address] 09/2013    on basis of elev a1c    Fibrocystic breast disease     Dr. Cristian King    GERD (gastroesophageal reflux disease)     Dr. Rosi Saab s/p dilation 2/12    H/O echocardiogram     NST neg, ef 75% (4/14)    H/O pulmonary function tests 2011    ratio 70, FEV1 81 no change postbd, TLC 93, RV 94, DLCO 81    Hyperlipidemia     Insomnia     Osteoarthritis     knee s/p cortisone; SI joint on xray 7/21    Osteopenia     DEXA t-score -1.2 spine, -0.2 hip (4/08);  spine -1.2, hip 0.1 (10/10);  -1.8 spine, -0.3 FRAX 6.3/0.2 (7/13); [facility] -1.7 spine, 0.1 hip (4/17); -1.7 spine, 0.2 hip (9/21)    Overweight (BMI 25.0-29. 9)     IF 5/18 but unwilling to do; phentermine 1/19

## 2023-04-27 NOTE — PROGRESS NOTES
David Roach presents today for   Chief Complaint   Patient presents with    Cough     Productive cough green phlegm x 5 weeks     Congestion       1. \"Have you been to the ER, urgent care clinic since your last visit? Hospitalized since your last visit? \" no    2. \"Have you seen or consulted any other health care providers outside of the [address] [place] [address] since your last visit? \" no     3. For patients aged 39-70: Has the patient had a colonoscopy / FIT/ Cologuard? Yes      If the patient is female:    4. For patients aged 41-77: Has the patient had a mammogram within the past 2 years? Yes  See top three    5. For patients aged 21-65: Has the patient had a pap smear?  N/A

## 2023-04-27 NOTE — ASSESSMENT & PLAN NOTE
Treated 3/2023 with Zpack  Take Mucinex BID with 8oz of water  Stay well hydrated  Avoid dairy til cough has subsided (thickens mucous)  Humidifier at night  Sleep with shoulders elevated.    Initiate doxycycline  Discussed following up with pulmonology and PCP

## 2023-05-04 ENCOUNTER — TELEPHONE (OUTPATIENT)
Age: 73
End: 2023-05-04

## 2023-05-04 DIAGNOSIS — F41.9 ANXIETY: Primary | ICD-10-CM

## 2023-05-04 RX ORDER — DIAZEPAM 5 MG/1
5 TABLET ORAL EVERY 12 HOURS PRN
Qty: 40 TABLET | Refills: 0 | Status: SHIPPED | OUTPATIENT
Start: 2023-05-04 | End: 2023-06-13

## 2023-05-04 NOTE — TELEPHONE ENCOUNTER
Requested refill:    - diazePAM (VALIUM) 5 mg tablet    Pharmacy: [facility] on [facility]

## 2023-05-05 LAB
A/G RATIO: 2.3 RATIO (ref 1.1–2.6)
ALBUMIN SERPL-MCNC: 4.4 G/DL (ref 3.5–5)
ALP BLD-CCNC: 53 U/L (ref 40–120)
ALT SERPL-CCNC: 16 U/L (ref 5–40)
ANION GAP SERPL CALCULATED.3IONS-SCNC: 9 MMOL/L (ref 3–15)
AST SERPL-CCNC: 15 U/L (ref 10–37)
AVERAGE GLUCOSE: 146 MG/DL (ref 91–123)
BILIRUB SERPL-MCNC: 0.3 MG/DL (ref 0.2–1.2)
BUN BLDV-MCNC: 15 MG/DL (ref 6–22)
CALCIUM SERPL-MCNC: 9.3 MG/DL (ref 8.4–10.5)
CHLORIDE BLD-SCNC: 104 MMOL/L (ref 98–110)
CHOLESTEROL/HDL RATIO: 2.8 (ref 0–5)
CHOLESTEROL: 154 MG/DL (ref 110–200)
CO2: 25 MMOL/L (ref 20–32)
CREAT SERPL-MCNC: 0.7 MG/DL (ref 0.8–1.4)
CREATININE URINE: 178 MG/DL
GLOBULIN: 1.9 G/DL (ref 2–4)
GLOMERULAR FILTRATION RATE: >60 ML/MIN/1.73 SQ.M.
GLUCOSE: 116 MG/DL (ref 70–99)
HBA1C MFR BLD: 6.7 % (ref 4.8–5.6)
HCT VFR BLD CALC: 41.3 % (ref 35.1–48.3)
HDLC SERPL-MCNC: 56 MG/DL
HEMOGLOBIN: 13.3 G/DL (ref 11.7–16.1)
LDL CHOLESTEROL CALCULATED: 73 MG/DL (ref 50–99)
LDL/HDL RATIO: 1.3
MCH RBC QN AUTO: 30 PG (ref 26–34)
MCHC RBC AUTO-ENTMCNC: 32 G/DL (ref 31–36)
MCV RBC AUTO: 93 FL (ref 80–99)
MICROALB/CREAT RATIO (UG/MG CREAT.): NORMAL
MICROALBUMIN/CREAT 24H UR: <12 MG/L (ref 0.1–17)
NON-HDL CHOLESTEROL: 98 MG/DL
PDW BLD-RTO: 14 % (ref 10–15.5)
PLATELET # BLD: 312 K/UL (ref 140–440)
PMV BLD AUTO: 9.9 FL (ref 9–13)
POTASSIUM SERPL-SCNC: 4.4 MMOL/L (ref 3.5–5.5)
RBC: 4.43 M/UL (ref 3.8–5.2)
SODIUM BLD-SCNC: 138 MMOL/L (ref 133–145)
TOTAL PROTEIN: 6.3 G/DL (ref 6.2–8.1)
TRIGL SERPL-MCNC: 127 MG/DL (ref 40–149)
VITAMIN D 25-HYDROXY: 36.6 NG/ML (ref 32–100)
VLDLC SERPL CALC-MCNC: 25 MG/DL (ref 8–30)
WBC: 5.3 K/UL (ref 4–11)

## 2023-05-08 ENCOUNTER — TELEPHONE (OUTPATIENT)
Age: 73
End: 2023-05-08

## 2023-05-08 RX ORDER — SEMAGLUTIDE 1.34 MG/ML
INJECTION, SOLUTION SUBCUTANEOUS
Qty: 4 ADJUSTABLE DOSE PRE-FILLED PEN SYRINGE | Refills: 2 | Status: SHIPPED | OUTPATIENT
Start: 2023-05-08

## 2023-05-08 NOTE — TELEPHONE ENCOUNTER
PCP:  Arthur Mauro MD    Last appt: [unfilled]  Future Appointments   Date Time Provider Betito Gutiérrez   5/11/2023  8:20 AM Juju Finch MD [facility] BS AMB       Requested Prescriptions     Pending Prescriptions Disp Refills    Semaglutide, 1 MG/DOSE, 4 MG/3ML SOPN       Sig: Inject 0.5 mg into the skin every 7 days

## 2023-05-09 NOTE — PROGRESS NOTES
Asmita Chriss presents today for   Chief Complaint   Patient presents with    Follow-up    Discuss Labs     5-4-23    Diabetes     Type 2 with hyperglycemia     Hyperlipidemia     1. \"Have you been to the ER, urgent care clinic since your last visit? Hospitalized since your last visit? \" no    2. \"Have you seen or consulted any other health care providers outside of the [address] [place] [address] since your last visit? \" no     3. For patients aged 39-70: Has the patient had a colonoscopy / FIT/ Cologuard? Yes - no Care Gap present      If the patient is female:    4. For patients aged 41-77: Has the patient had a mammogram within the past 2 years? Yes - no Care Gap present      5. For patients aged 21-65: Has the patient had a pap smear?  NA - based on age or sex

## 2023-05-09 NOTE — TELEPHONE ENCOUNTER
Pharmacy requested a new script for the new packaging for ozempic. The 3ml pen packaging is needed. Patient is taking 0.5 mg weekly. Called to confirm.

## 2023-05-11 ENCOUNTER — OFFICE VISIT (OUTPATIENT)
Age: 73
End: 2023-05-11
Payer: COMMERCIAL

## 2023-05-11 VITALS
BODY MASS INDEX: 22.68 KG/M2 | RESPIRATION RATE: 19 BRPM | SYSTOLIC BLOOD PRESSURE: 118 MMHG | OXYGEN SATURATION: 98 % | HEIGHT: 63 IN | TEMPERATURE: 98.2 F | WEIGHT: 128 LBS | DIASTOLIC BLOOD PRESSURE: 61 MMHG | HEART RATE: 90 BPM

## 2023-05-11 DIAGNOSIS — E11.65 UNCONTROLLED TYPE 2 DIABETES MELLITUS WITH HYPERGLYCEMIA (HCC): Primary | ICD-10-CM

## 2023-05-11 DIAGNOSIS — K21.9 GASTROESOPHAGEAL REFLUX DISEASE WITHOUT ESOPHAGITIS: ICD-10-CM

## 2023-05-11 DIAGNOSIS — J47.9 BRONCHIECTASIS WITHOUT COMPLICATION (HCC): ICD-10-CM

## 2023-05-11 DIAGNOSIS — E78.5 HYPERLIPIDEMIA, UNSPECIFIED HYPERLIPIDEMIA TYPE: ICD-10-CM

## 2023-05-11 PROBLEM — J06.9 UPPER RESPIRATORY TRACT INFECTION: Status: RESOLVED | Noted: 2023-04-27 | Resolved: 2023-05-11

## 2023-05-11 PROCEDURE — 1123F ACP DISCUSS/DSCN MKR DOCD: CPT | Performed by: INTERNAL MEDICINE

## 2023-05-11 PROCEDURE — 3044F HG A1C LEVEL LT 7.0%: CPT | Performed by: INTERNAL MEDICINE

## 2023-05-11 PROCEDURE — 99214 OFFICE O/P EST MOD 30 MIN: CPT | Performed by: INTERNAL MEDICINE

## 2023-05-11 RX ORDER — PANTOPRAZOLE SODIUM 40 MG/1
40 TABLET, DELAYED RELEASE ORAL
Qty: 180 TABLET | Refills: 3 | Status: SHIPPED | OUTPATIENT
Start: 2023-05-11

## 2023-05-11 RX ORDER — RALOXIFENE HYDROCHLORIDE 60 MG/1
60 TABLET, FILM COATED ORAL DAILY
COMMUNITY
Start: 2023-02-19

## 2023-05-11 SDOH — ECONOMIC STABILITY: FOOD INSECURITY: WITHIN THE PAST 12 MONTHS, THE FOOD YOU BOUGHT JUST DIDN'T LAST AND YOU DIDN'T HAVE MONEY TO GET MORE.: NEVER TRUE

## 2023-05-11 SDOH — ECONOMIC STABILITY: INCOME INSECURITY: HOW HARD IS IT FOR YOU TO PAY FOR THE VERY BASICS LIKE FOOD, HOUSING, MEDICAL CARE, AND HEATING?: NOT HARD AT ALL

## 2023-05-11 SDOH — ECONOMIC STABILITY: FOOD INSECURITY: WITHIN THE PAST 12 MONTHS, YOU WORRIED THAT YOUR FOOD WOULD RUN OUT BEFORE YOU GOT MONEY TO BUY MORE.: NEVER TRUE

## 2023-05-11 ASSESSMENT — PATIENT HEALTH QUESTIONNAIRE - PHQ9
SUM OF ALL RESPONSES TO PHQ QUESTIONS 1-9: 0
2. FEELING DOWN, DEPRESSED OR HOPELESS: 0
SUM OF ALL RESPONSES TO PHQ9 QUESTIONS 1 & 2: 0
SUM OF ALL RESPONSES TO PHQ QUESTIONS 1-9: 0
SUM OF ALL RESPONSES TO PHQ QUESTIONS 1-9: 0
1. LITTLE INTEREST OR PLEASURE IN DOING THINGS: 0
SUM OF ALL RESPONSES TO PHQ QUESTIONS 1-9: 0

## 2023-06-03 DIAGNOSIS — E55.9 VITAMIN D DEFICIENCY, UNSPECIFIED: ICD-10-CM

## 2023-06-04 RX ORDER — ERGOCALCIFEROL 1.25 MG/1
CAPSULE ORAL
Qty: 12 CAPSULE | Refills: 4 | Status: SHIPPED | OUTPATIENT
Start: 2023-06-04

## 2023-06-09 ENCOUNTER — TELEPHONE (OUTPATIENT)
Age: 73
End: 2023-06-09

## 2023-06-09 DIAGNOSIS — F41.9 ANXIETY: ICD-10-CM

## 2023-06-09 RX ORDER — DIAZEPAM 5 MG/1
5 TABLET ORAL EVERY 12 HOURS PRN
Qty: 40 TABLET | Refills: 0 | Status: SHIPPED | OUTPATIENT
Start: 2023-06-09 | End: 2023-07-19

## 2023-06-28 DIAGNOSIS — G25.81 RESTLESS LEGS SYNDROME: ICD-10-CM

## 2023-06-29 RX ORDER — PRAMIPEXOLE DIHYDROCHLORIDE 0.25 MG/1
TABLET ORAL
Qty: 270 TABLET | Refills: 1 | Status: SHIPPED | OUTPATIENT
Start: 2023-06-29

## 2023-07-19 ENCOUNTER — TELEPHONE (OUTPATIENT)
Age: 73
End: 2023-07-19

## 2023-07-19 DIAGNOSIS — F41.9 ANXIETY: ICD-10-CM

## 2023-07-19 RX ORDER — DIAZEPAM 5 MG/1
5 TABLET ORAL EVERY 12 HOURS PRN
Qty: 40 TABLET | Refills: 0 | Status: SHIPPED | OUTPATIENT
Start: 2023-07-19 | End: 2023-08-28

## 2023-07-19 NOTE — TELEPHONE ENCOUNTER
Requested refill:    - diazePAM (VALIUM) 5 MG tablet    Pharmacy: [facility] on [facility]

## 2023-07-28 ENCOUNTER — TELEPHONE (OUTPATIENT)
Age: 73
End: 2023-07-28

## 2023-07-28 RX ORDER — SEMAGLUTIDE 1.34 MG/ML
INJECTION, SOLUTION SUBCUTANEOUS
Qty: 4 ADJUSTABLE DOSE PRE-FILLED PEN SYRINGE | Refills: 2 | Status: CANCELLED | OUTPATIENT
Start: 2023-07-28

## 2023-07-28 NOTE — TELEPHONE ENCOUNTER
Refill request:    - Semaglutide, 1 MG/DOSE, 4 MG/3ML SOPN    Pharmacy: [facility] on [facility] [address]

## 2023-08-08 ENCOUNTER — TELEPHONE (OUTPATIENT)
Age: 73
End: 2023-08-08

## 2023-08-08 NOTE — TELEPHONE ENCOUNTER
Patient called and has been feeling ill since Saturday. Patient has congestion, cough, head and chest pressure and is coughing up some phlegm. Patient reports no fever and took a Covid test that was negative yesterday. Please Advise.

## 2023-08-28 NOTE — TELEPHONE ENCOUNTER
Please refill and send to [facility]/PHARMACY #[id number]- [place], [address] - P [phone] - F [phone]      Semaglutide,0.25 or 0.5MG/DOS, (OZEMPIC, 0.25 OR 0.5 MG/DOSE,) 2 MG/3ML SOPN     diazePAM (VALIUM) 5 MG tablet

## 2023-08-29 RX ORDER — SEMAGLUTIDE 0.68 MG/ML
0.5 INJECTION, SOLUTION SUBCUTANEOUS
Qty: 3 ML | Refills: 11 | Status: SHIPPED | OUTPATIENT
Start: 2023-08-29

## 2023-09-04 DIAGNOSIS — F41.9 ANXIETY: ICD-10-CM

## 2023-09-05 RX ORDER — DIAZEPAM 5 MG/1
TABLET ORAL
Qty: 40 TABLET | Refills: 0 | Status: SHIPPED | OUTPATIENT
Start: 2023-09-05 | End: 2023-10-05

## 2023-09-13 ENCOUNTER — TELEPHONE (OUTPATIENT)
Age: 73
End: 2023-09-13

## 2023-09-13 NOTE — TELEPHONE ENCOUNTER
Patient states she believes she has a UTI, since Monday she has had urinary burning, frequency, and urgency that has gotten worse over the past day or so. She is calling to see if she can be tested? Patient can be reached at [phone]. Please advise ,thank you.

## 2023-09-14 NOTE — TELEPHONE ENCOUNTER
Contacted patient and states she is feeling a little better today but went ahead and added her to lab schedule for tomorrow morning at 9. Patient stated she will call first thing if anything changes.

## 2023-10-03 DIAGNOSIS — F41.9 ANXIETY: ICD-10-CM

## 2023-10-03 RX ORDER — DIAZEPAM 5 MG/1
TABLET ORAL
Qty: 40 TABLET | Refills: 0 | Status: SHIPPED | OUTPATIENT
Start: 2023-10-03 | End: 2023-11-03

## 2023-10-03 NOTE — TELEPHONE ENCOUNTER
----- Message from Waltbebetogrover Araujo sent at 10/2/2023  1:42 PM EDT -----  Subject: Refill Request    QUESTIONS  Name of Medication? diazePAM (VALIUM) 5 MG tablet  Patient-reported dosage and instructions? 5mg  How many days do you have left? 2  [id number]  Pharmacy phone number (if available)? [phone]  ---------------------------------------------------------------------------  --------------  CALL BACK INFO  What is the best way for the office to contact you? OK to leave message on   voicemail  Preferred Call Back Phone Number? [phone]  ---------------------------------------------------------------------------  --------------  SCRIPT ANSWERS  Relationship to Patient?  Self

## 2023-10-20 ENCOUNTER — TELEPHONE (OUTPATIENT)
Age: 73
End: 2023-10-20

## 2023-10-20 NOTE — TELEPHONE ENCOUNTER
Patient called into nurse triage and states since yesterday she has experienced chills, a fever around 100, body aches and when she takes a deep breath she feels a pain in her left lung. No avliable appts until 11/3. Patient states she will most likely go to . Please advise.

## 2023-10-27 ENCOUNTER — NURSE ONLY (OUTPATIENT)
Age: 73
End: 2023-10-27

## 2023-10-27 ENCOUNTER — HOSPITAL ENCOUNTER (OUTPATIENT)
Facility: HOSPITAL | Age: 73
Setting detail: SPECIMEN
Discharge: HOME | End: 2023-10-30

## 2023-10-27 ENCOUNTER — TELEPHONE (OUTPATIENT)
Age: 73
End: 2023-10-27

## 2023-10-27 DIAGNOSIS — E11.65 UNCONTROLLED TYPE 2 DIABETES MELLITUS WITH HYPERGLYCEMIA (HCC): ICD-10-CM

## 2023-10-27 LAB — SENTARA SPECIMEN COLLECTION: NORMAL

## 2023-10-27 NOTE — TELEPHONE ENCOUNTER
[facility] for Semaglutide,0.25 or 0.5MG/DOS, (OZEMPIC, 0.25 OR 0.5 MG/DOSE,) 2 MG/3ML SOPN    Please advise

## 2023-11-03 ENCOUNTER — TELEPHONE (OUTPATIENT)
Age: 73
End: 2023-11-03

## 2023-11-03 DIAGNOSIS — F41.9 ANXIETY: ICD-10-CM

## 2023-11-03 RX ORDER — DIAZEPAM 5 MG/1
TABLET ORAL
Qty: 40 TABLET | Refills: 0 | Status: SHIPPED | OUTPATIENT
Start: 2023-11-03 | End: 2023-12-04

## 2023-11-03 NOTE — TELEPHONE ENCOUNTER
Please send refill to [facility].    diazePAM (VALIUM) 5 MG tablet 40 tablet 0 10/3/2023 11/3/2023    Sig: TAKE 1 TAB ORALLY EVERY 12 HOURS AS NEEDED FOR ANXIETY OR SLEEP FOR UP TO 40 DAYS.  MAX DAILY: 10MG

## 2023-11-05 NOTE — PROGRESS NOTES
72 y.o. female who presents for evaluation    She continues to tamox for RIGHT breast ca and seeing Dr Cole Stallworth to do at least 6-8k steps daily on her counter, no cardiovascular complaints. Denies polyuria, polydipsia, nocturia, vision change. PPS sugar in the 120-130 range, doing well on ozempic 0.5.  she does not want to lose any more weight     2/14/2022 9/2/2022 1/5/2023 5/11/2023 11/9/2023   Vitals        Weight - Scale 131 lb  136 lb  143 lb  128 lb  122 lb 12.8 oz      Remains on PPI and iron supplementation. She has been having more problems with gerd and dyspepsia, using the antacid more. She has not seen Dr. Elisabet Ryan for bronchiectasis of late, remains on Advair as needed. She did have another bout of CAP dx'ed at urgent care 3-4 weeks ago. She is due for prevnar-20 in 1/24. Wants to take hdflu today    LAST MEDICARE WELLNESS EXAM: never as not medicare b    Past Medical History:   Diagnosis Date    ADD (attention deficit disorder) 2014    [facility] psych    Amebic colitis 1970s    Anemia, iron deficiency 05/2018    eval Dr Natalio Ku showed gastritis    Breast cancer ([facility]) 02/2016    Dr Charu Borja; RIGHT partial mastectomy, adjuvant XRT    Bronchiectasis ([facility])     Dr. Elisabet Ryan; RLL    COVID-19 vaccine dose declined     boosters    COVID-19 virus infection     12/20; 8/22; 10/22    DM (diabetes mellitus) ([facility]) 09/2013    on basis of elev a1c    Fibrocystic breast disease     Dr. Lupe Manley    GERD (gastroesophageal reflux disease)     Dr. Natalio Ku s/p dilation 2/12    H/O echocardiogram     NST neg, ef 75% (4/14)    H/O pulmonary function tests 2011    ratio 70, FEV1 81 no change postbd, TLC 93, RV 94, DLCO 81    Hyperlipidemia     Insomnia     Osteoarthritis     knee s/p cortisone; SI joint on xray 7/21    Osteopenia     DEXA t-score -1.2 spine, -0.2 hip (4/08);  spine -1.2, hip 0.1 (10/10);  -1.8 spine, -0.3 FRAX 6.3/0.2 (7/13);  [place] -1.7 spine, 0.1 hip (4/17); -1.7 spine, 0.2 hip (9/21)

## 2023-11-09 ENCOUNTER — OFFICE VISIT (OUTPATIENT)
Age: 73
End: 2023-11-09
Payer: COMMERCIAL

## 2023-11-09 VITALS
OXYGEN SATURATION: 98 % | HEIGHT: 63 IN | RESPIRATION RATE: 16 BRPM | BODY MASS INDEX: 21.76 KG/M2 | HEART RATE: 82 BPM | WEIGHT: 122.8 LBS | TEMPERATURE: 97.1 F | SYSTOLIC BLOOD PRESSURE: 119 MMHG | DIASTOLIC BLOOD PRESSURE: 53 MMHG

## 2023-11-09 DIAGNOSIS — E78.5 HYPERLIPIDEMIA, UNSPECIFIED HYPERLIPIDEMIA TYPE: ICD-10-CM

## 2023-11-09 DIAGNOSIS — J47.9 BRONCHIECTASIS WITHOUT COMPLICATION (HCC): ICD-10-CM

## 2023-11-09 DIAGNOSIS — E11.9 CONTROLLED TYPE 2 DIABETES MELLITUS WITHOUT COMPLICATION, WITHOUT LONG-TERM CURRENT USE OF INSULIN (HCC): ICD-10-CM

## 2023-11-09 DIAGNOSIS — Z23 INFLUENZA VACCINE NEEDED: Primary | ICD-10-CM

## 2023-11-09 PROBLEM — E11.65 UNCONTROLLED TYPE 2 DIABETES MELLITUS WITH HYPERGLYCEMIA (HCC): Status: RESOLVED | Noted: 2023-01-05 | Resolved: 2023-11-09

## 2023-11-09 PROCEDURE — 90694 VACC AIIV4 NO PRSRV 0.5ML IM: CPT | Performed by: INTERNAL MEDICINE

## 2023-11-09 PROCEDURE — 90471 IMMUNIZATION ADMIN: CPT | Performed by: INTERNAL MEDICINE

## 2023-11-09 PROCEDURE — 99214 OFFICE O/P EST MOD 30 MIN: CPT | Performed by: INTERNAL MEDICINE

## 2023-11-09 PROCEDURE — 1123F ACP DISCUSS/DSCN MKR DOCD: CPT | Performed by: INTERNAL MEDICINE

## 2023-11-09 PROCEDURE — 3044F HG A1C LEVEL LT 7.0%: CPT | Performed by: INTERNAL MEDICINE

## 2023-11-09 RX ORDER — CLOTRIMAZOLE AND BETAMETHASONE DIPROPIONATE 10; .64 MG/G; MG/G
CREAM TOPICAL
Status: CANCELLED | OUTPATIENT
Start: 2023-11-09

## 2023-11-09 SDOH — ECONOMIC STABILITY: FOOD INSECURITY: WITHIN THE PAST 12 MONTHS, YOU WORRIED THAT YOUR FOOD WOULD RUN OUT BEFORE YOU GOT MONEY TO BUY MORE.: NEVER TRUE

## 2023-11-09 SDOH — ECONOMIC STABILITY: FOOD INSECURITY: WITHIN THE PAST 12 MONTHS, THE FOOD YOU BOUGHT JUST DIDN'T LAST AND YOU DIDN'T HAVE MONEY TO GET MORE.: NEVER TRUE

## 2023-11-09 SDOH — ECONOMIC STABILITY: INCOME INSECURITY: HOW HARD IS IT FOR YOU TO PAY FOR THE VERY BASICS LIKE FOOD, HOUSING, MEDICAL CARE, AND HEATING?: NOT HARD AT ALL

## 2023-11-09 ASSESSMENT — PATIENT HEALTH QUESTIONNAIRE - PHQ9
SUM OF ALL RESPONSES TO PHQ QUESTIONS 1-9: 0
SUM OF ALL RESPONSES TO PHQ9 QUESTIONS 1 & 2: 0
SUM OF ALL RESPONSES TO PHQ QUESTIONS 1-9: 0
2. FEELING DOWN, DEPRESSED OR HOPELESS: 0
1. LITTLE INTEREST OR PLEASURE IN DOING THINGS: 0

## 2023-11-22 DIAGNOSIS — G25.81 RESTLESS LEGS SYNDROME: ICD-10-CM

## 2023-11-22 RX ORDER — PRAMIPEXOLE DIHYDROCHLORIDE 0.25 MG/1
0.25 TABLET ORAL 3 TIMES DAILY
Qty: 270 TABLET | Refills: 1 | Status: SHIPPED | OUTPATIENT
Start: 2023-11-22

## 2023-12-06 ENCOUNTER — TELEPHONE (OUTPATIENT)
Age: 73
End: 2023-12-06

## 2023-12-06 DIAGNOSIS — F41.9 ANXIETY: Primary | ICD-10-CM

## 2023-12-06 RX ORDER — DIAZEPAM 5 MG/1
5 TABLET ORAL EVERY 12 HOURS PRN
Qty: 40 TABLET | Refills: 0 | Status: SHIPPED | OUTPATIENT
Start: 2023-12-06 | End: 2024-01-05

## 2023-12-06 NOTE — TELEPHONE ENCOUNTER
Please send refill to [facility]    diazePAM (VALIUM) 5 MG tablet 40 tablet       Sig: TAKE 1 TAB ORALLY EVERY 12 HOURS AS NEEDED FOR ANXIETY OR SLEEP FOR UP TO 40 DAYS.  MAX DAILY: 10MG

## 2023-12-26 DIAGNOSIS — E78.5 HYPERLIPIDEMIA, UNSPECIFIED: ICD-10-CM

## 2023-12-29 RX ORDER — PRAVASTATIN SODIUM 40 MG
TABLET ORAL
Qty: 90 TABLET | Refills: 3 | Status: SHIPPED | OUTPATIENT
Start: 2023-12-29

## 2024-01-01 NOTE — TELEPHONE ENCOUNTER
90 d/s pended as requested. Please sign if appropriate. Last Visit: 4/23/20 with MD Shae Galicia  Next Appointment: 10/9/20 with MD Shae Galicia  Previous Refill Encounter(s): 4/14/20 #30 with 1 refill    Requested Prescriptions     Pending Prescriptions Disp Refills    nortriptyline (PAMELOR) 10 mg capsule 90 Cap 0     Sig: Take 1 Cap by mouth nightly. CBC:   Chem:   Liver Functions:   Type & Screen:   Bilirubin:   TSH:   T4:

## 2024-01-12 ENCOUNTER — TELEPHONE (OUTPATIENT)
Age: 74
End: 2024-01-12

## 2024-01-12 DIAGNOSIS — F41.9 ANXIETY: Primary | ICD-10-CM

## 2024-01-12 NOTE — TELEPHONE ENCOUNTER
Refill req diazePAM (VALIUM) 5 MG tablet     Pharmacy [facility] #[id number] - [place], [place] - [address] - P [phone] - F [phone] [[id number]]

## 2024-01-15 RX ORDER — DIAZEPAM 5 MG/1
5 TABLET ORAL EVERY 12 HOURS PRN
Qty: 60 TABLET | Refills: 0 | Status: SHIPPED | OUTPATIENT
Start: 2024-01-15 | End: 2024-02-14

## 2024-01-15 NOTE — TELEPHONE ENCOUNTER
[facility]  [place] reviewed 1/15/2024    The last fill date for  diazepam 5 mg was 12/6/2023 for a 20 d/s qty 40          Last UDS: not on file     CSA Last signed: not on file         PCP: Ruel Freed MD      Future Appointments   Date Time Provider Department Center   5/2/2024  8:15 AM [facility] LAB VISIT [facility] AMB   5/9/2024  8:00 AM Ruel Freed MD [facility] AMB

## 2024-03-04 ENCOUNTER — TELEPHONE (OUTPATIENT)
Age: 74
End: 2024-03-04

## 2024-03-04 DIAGNOSIS — F41.9 ANXIETY: Primary | ICD-10-CM

## 2024-03-04 NOTE — TELEPHONE ENCOUNTER
Please refill and send to [facility]/PHARMACY #[id number] - [place], [place] - [address] - P [phone] - F [phone] [[id number]]     diazePAM (VALIUM) 5 MG tablet

## 2024-03-05 RX ORDER — DIAZEPAM 5 MG/1
5 TABLET ORAL NIGHTLY PRN
Qty: 60 TABLET | Refills: 0 | Status: SHIPPED | OUTPATIENT
Start: 2024-03-05 | End: 2024-05-04

## 2024-04-08 ENCOUNTER — TELEPHONE (OUTPATIENT)
Age: 74
End: 2024-04-08

## 2024-04-08 DIAGNOSIS — F41.9 ANXIETY: Primary | ICD-10-CM

## 2024-04-08 NOTE — TELEPHONE ENCOUNTER
Pt called requesting a refill on medication pt stated that she used to receive 40 tablets a month and now its 30 tablets and would like to know why        diazePAM (VALIUM) 5 MG tablet     [facility]/PHARMACY #[id number] - [place], [place] - [address] - P [phone] - F [phone] [[id number]]

## 2024-04-09 NOTE — TELEPHONE ENCOUNTER
We had been giving 60 tabs for 30 days    Will refill in 1 month but she needs to call and we can correct the script    Have her ask for 60 tabs when she calls pls to remind us          Pls call pt to have csa signed and get uds

## 2024-05-02 ENCOUNTER — NURSE ONLY (OUTPATIENT)
Age: 74
End: 2024-05-02

## 2024-05-02 DIAGNOSIS — E11.9 CONTROLLED TYPE 2 DIABETES MELLITUS WITHOUT COMPLICATION, WITHOUT LONG-TERM CURRENT USE OF INSULIN (HCC): ICD-10-CM

## 2024-05-02 DIAGNOSIS — F41.9 ANXIETY: ICD-10-CM

## 2024-05-02 DIAGNOSIS — E11.65 UNCONTROLLED TYPE 2 DIABETES MELLITUS WITH HYPERGLYCEMIA (HCC): ICD-10-CM

## 2024-05-03 LAB
6-ACETYLMORPHINE SCREEN URINE: ABNORMAL
A/G RATIO: 1.9 RATIO (ref 1.1–2.6)
ALBUMIN: 4.4 G/DL (ref 3.5–5)
ALP BLD-CCNC: 65 U/L (ref 40–120)
ALT SERPL-CCNC: 19 U/L (ref 5–40)
AMPHETAMINE SCREEN, URINE: ABNORMAL
ANION GAP SERPL CALCULATED.3IONS-SCNC: 9 MMOL/L (ref 3–15)
AST SERPL-CCNC: 16 U/L (ref 10–37)
BARBITURATES: ABNORMAL
BASOPHILS ABSOLUTE: 0.1 K/UL (ref 0–0.2)
BASOPHILS RELATIVE PERCENT: 1 % (ref 0–2)
BENZODIAZEPINES: ABNORMAL
BILIRUB SERPL-MCNC: 0.3 MG/DL (ref 0.2–1.2)
BUN BLDV-MCNC: 18 MG/DL (ref 6–22)
CALCIUM SERPL-MCNC: 9.5 MG/DL (ref 8.4–10.5)
CANNABINOIDS: ABNORMAL
CHLORIDE BLD-SCNC: 103 MMOL/L (ref 98–110)
CHOLESTEROL, TOTAL: 197 MG/DL (ref 110–200)
CHOLESTEROL/HDL RATIO: 3.2 (ref 0–5)
CO2: 26 MMOL/L (ref 20–32)
COCAINE (METABOLITE): ABNORMAL
CREAT SERPL-MCNC: 0.7 MG/DL (ref 0.8–1.4)
CREATININE URINE: 92 MG/DL
CREATININE URINE: 93 MG/DL
EOSINOPHIL # BLD: 1 % (ref 0–6)
EOSINOPHILS ABSOLUTE: 0.1 K/UL (ref 0–0.5)
ESTIMATED AVERAGE GLUCOSE: 137 MG/DL (ref 91–123)
GFR, ESTIMATED: >60 ML/MIN/1.73 SQ.M.
GLOBULIN: 2.3 G/DL (ref 2–4)
GLUCOSE: 115 MG/DL (ref 70–99)
HBA1C MFR BLD: 6.4 % (ref 4.8–5.6)
HCT VFR BLD CALC: 45.2 % (ref 35.1–48.3)
HDLC SERPL-MCNC: 62 MG/DL
HEMOGLOBIN: 14.2 G/DL (ref 11.7–16.1)
HYDROCODONE, URINE: ABNORMAL
LDL CHOLESTEROL: 109 MG/DL (ref 50–99)
LDL/HDL RATIO: 1.8
LYMPHOCYTES # BLD: 38 % (ref 20–45)
LYMPHOCYTES ABSOLUTE: 1.8 K/UL (ref 1–4.8)
MCH RBC QN AUTO: 29 PG (ref 26–34)
MCHC RBC AUTO-ENTMCNC: 31 G/DL (ref 31–36)
MCV RBC AUTO: 92 FL (ref 80–99)
MICROALB/CREAT RATIO (UG/MG CREAT.): NORMAL
MICROALBUMIN/CREAT 24H UR: <12 MG/L (ref 0.1–17)
MONOCYTES ABSOLUTE: 0.4 K/UL (ref 0.1–1)
MONOCYTES: 9 % (ref 3–12)
NEUTROPHILS ABSOLUTE: 2.4 K/UL (ref 1.8–7.7)
NEUTROPHILS: 51 % (ref 40–75)
NON-HDL CHOLESTEROL: 135 MG/DL
OPIATES: ABNORMAL
OXYCODONE URINE: ABNORMAL
PDW BLD-RTO: 13.6 % (ref 10–15.5)
PH, URINE: 5.9 (ref 4.5–8)
PLATELET # BLD: 301 K/UL (ref 140–440)
PMV BLD AUTO: 9.7 FL (ref 9–13)
POTASSIUM SERPL-SCNC: 4.7 MMOL/L (ref 3.5–5.5)
RBC # BLD: 4.91 M/UL (ref 3.8–5.2)
SODIUM BLD-SCNC: 138 MMOL/L (ref 133–145)
SPECIFIC GRAVITY UA: 1.02 (ref 1–1.03)
TOTAL PROTEIN: 6.7 G/DL (ref 6.2–8.1)
TRIGL SERPL-MCNC: 130 MG/DL (ref 40–149)
VLDLC SERPL CALC-MCNC: 26 MG/DL (ref 8–30)
WBC # BLD: 4.7 K/UL (ref 4–11)

## 2024-05-09 DIAGNOSIS — E11.9 CONTROLLED TYPE 2 DIABETES MELLITUS WITHOUT COMPLICATION, WITHOUT LONG-TERM CURRENT USE OF INSULIN (HCC): ICD-10-CM

## 2024-05-14 ENCOUNTER — OFFICE VISIT (OUTPATIENT)
Age: 74
End: 2024-05-14
Payer: COMMERCIAL

## 2024-05-14 VITALS
OXYGEN SATURATION: 97 % | DIASTOLIC BLOOD PRESSURE: 77 MMHG | WEIGHT: 125 LBS | SYSTOLIC BLOOD PRESSURE: 138 MMHG | HEART RATE: 86 BPM | RESPIRATION RATE: 14 BRPM | HEIGHT: 63 IN | TEMPERATURE: 98.2 F | BODY MASS INDEX: 22.15 KG/M2

## 2024-05-14 DIAGNOSIS — E04.1 THYROID NODULE: ICD-10-CM

## 2024-05-14 DIAGNOSIS — Z85.3 HISTORY OF BREAST CANCER: ICD-10-CM

## 2024-05-14 DIAGNOSIS — F41.9 ANXIETY: ICD-10-CM

## 2024-05-14 DIAGNOSIS — J47.9 BRONCHIECTASIS WITHOUT COMPLICATION (HCC): ICD-10-CM

## 2024-05-14 DIAGNOSIS — K21.9 GASTROESOPHAGEAL REFLUX DISEASE WITHOUT ESOPHAGITIS: ICD-10-CM

## 2024-05-14 DIAGNOSIS — E55.9 VITAMIN D DEFICIENCY: ICD-10-CM

## 2024-05-14 DIAGNOSIS — E78.5 HYPERLIPIDEMIA, UNSPECIFIED HYPERLIPIDEMIA TYPE: ICD-10-CM

## 2024-05-14 DIAGNOSIS — R35.0 URINARY FREQUENCY: ICD-10-CM

## 2024-05-14 DIAGNOSIS — Z00.00 PHYSICAL EXAM: Primary | ICD-10-CM

## 2024-05-14 DIAGNOSIS — E11.9 CONTROLLED TYPE 2 DIABETES MELLITUS WITHOUT COMPLICATION, WITHOUT LONG-TERM CURRENT USE OF INSULIN (HCC): ICD-10-CM

## 2024-05-14 PROBLEM — E66.3 OVERWEIGHT (BMI 25.0-29.9): Status: RESOLVED | Noted: 2019-01-14 | Resolved: 2024-05-14

## 2024-05-14 LAB
BILIRUBIN, URINE, POC: NEGATIVE
BLOOD URINE, POC: ABNORMAL
GLUCOSE URINE, POC: NEGATIVE
KETONES, URINE, POC: NEGATIVE
LEUKOCYTE ESTERASE, URINE, POC: ABNORMAL
NITRITE, URINE, POC: POSITIVE
PH, URINE, POC: 5.5 (ref 4.6–8)
PROTEIN,URINE, POC: NEGATIVE
SPECIFIC GRAVITY, URINE, POC: 1.03 (ref 1–1.03)
URINALYSIS CLARITY, POC: ABNORMAL
URINALYSIS COLOR, POC: YELLOW
UROBILINOGEN, POC: NORMAL

## 2024-05-14 PROCEDURE — 99397 PER PM REEVAL EST PAT 65+ YR: CPT | Performed by: INTERNAL MEDICINE

## 2024-05-14 PROCEDURE — 81001 URINALYSIS AUTO W/SCOPE: CPT | Performed by: INTERNAL MEDICINE

## 2024-05-14 RX ORDER — SULFAMETHOXAZOLE AND TRIMETHOPRIM 800; 160 MG/1; MG/1
1 TABLET ORAL 2 TIMES DAILY
Qty: 10 TABLET | Refills: 0 | Status: SHIPPED | OUTPATIENT
Start: 2024-05-14 | End: 2024-05-19

## 2024-05-14 RX ORDER — DIAZEPAM 5 MG/1
5 TABLET ORAL EVERY 12 HOURS PRN
Qty: 40 TABLET | Refills: 1 | Status: SHIPPED | OUTPATIENT
Start: 2024-05-14 | End: 2024-07-13

## 2024-05-14 NOTE — PROGRESS NOTES
Odalis Brewster presents today for   Chief Complaint   Patient presents with    6 Month Follow-Up    Diabetes     Patient complaining of urinary frequency and pain, urine specimen collected.    \"Have you been to the ER, urgent care clinic since your last visit?  Hospitalized since your last visit?\"    NO    “Have you seen or consulted any other health care providers outside of [facility] since your last visit?”    NO             
patient's insurance.     No current facility-administered medications for this visit.     Allergies   Allergen Reactions    Atorvastatin Other (See Comments)     Leg cramps and muscle aches    Empagliflozin Other (See Comments)     Recurring yeast infections    Metformin Other (See Comments)     Gi intolerance    Moxifloxacin Other (See Comments)     Felt jittery    Nitrofurantoin Hives    Nsaids Other (See Comments)     H/o gastropathy    Phentermine Other (See Comments)     Felt bad    Pioglitazone Other (See Comments)     Felt bad, weight gain    Raloxifene Other (See Comments)     Severe flashes    Rosuvastatin Myalgia       Vitals:    05/14/24 0755   BP: 138/77   Site: Right Upper Arm   Position: Sitting   Cuff Size: Medium Adult   Pulse: 86   Resp: 14   Temp: 98.2 °F (36.8 °C)   TempSrc: Temporal   SpO2: 97%   Weight: 56.7 kg (125 lb)   Height: 1.6 m (5' 3\")   A&O x3  Affect is appropriate.  Mood stable  No apparent distress  Anicteric, no JVD, adenopathy or thyromegaly.   No carotid bruits or radiated murmur  Lungs clear to auscultation, no wheezes or rales  Heart showed regular rate and rhythm. No murmur, rubs, gallops  Abdomen soft nontender, no hepatosplenomegaly or masses.   Extremities without edema.  Pulses 1-2+ symmetrically    LABS  From 9/10 showed   gluc 106, cr 0.70, gfr>60, alt 42,                   chol 238, tg 154, hdl 54, ldl-c 153, wbc 6.5, hb 13.1, plt 258, ua neg  From 2/11 showed   gluc 95,   cr 0.90                                                                                                   wbc 7.0, hb 14.1, plt 308  From 3/12 showed   gluc 103, cr 0.90              alt 43,                                                                           wbc 6.2, hb 13.7, plt 285  From 3/13 showed   gluc 130, cr 0.60,             alt 22,                    chol 244, tg 180, hdl 57, ldl-c 151,                                          ua neg,     tsh 1.79  From 9/13 showed   gluc 119, cr 0.70,

## 2024-05-15 DIAGNOSIS — K21.9 GASTROESOPHAGEAL REFLUX DISEASE WITHOUT ESOPHAGITIS: ICD-10-CM

## 2024-05-16 RX ORDER — PANTOPRAZOLE SODIUM 40 MG/1
80 TABLET, DELAYED RELEASE ORAL
Qty: 180 TABLET | Refills: 3 | Status: SHIPPED | OUTPATIENT
Start: 2024-05-16

## 2024-05-17 ENCOUNTER — HOSPITAL ENCOUNTER (OUTPATIENT)
Facility: HOSPITAL | Age: 74
Discharge: HOME | End: 2024-05-17
Attending: INTERNAL MEDICINE
Payer: COMMERCIAL

## 2024-05-17 DIAGNOSIS — E04.1 THYROID NODULE: ICD-10-CM

## 2024-05-17 LAB — RESULT: ABNORMAL

## 2024-05-17 PROCEDURE — 76536 US EXAM OF HEAD AND NECK: CPT

## 2024-05-19 ENCOUNTER — TELEPHONE (OUTPATIENT)
Age: 74
End: 2024-05-19

## 2024-05-20 ENCOUNTER — TELEPHONE (OUTPATIENT)
Age: 74
End: 2024-05-20

## 2024-05-20 DIAGNOSIS — E04.1 THYROID NODULE: Primary | ICD-10-CM

## 2024-05-20 NOTE — TELEPHONE ENCOUNTER
----- Message from Odalis Brewster sent at 5/20/2024 10:10 AM EDT -----  Regarding: Results  Contact: [phone]  Yes they have from the UTI. I am waiting on test results from an ultrasound I had on my thyroid on Friday. Have you heard anything

## 2024-06-06 ENCOUNTER — HOSPITAL ENCOUNTER (OUTPATIENT)
Facility: HOSPITAL | Age: 74
Discharge: HOME | End: 2024-06-09

## 2024-06-06 PROBLEM — G25.81 RLS (RESTLESS LEGS SYNDROME): Status: ACTIVE | Noted: 2024-06-06

## 2024-06-06 LAB — SENTARA SPECIMEN COLLECTION: NORMAL

## 2024-06-06 PROCEDURE — 99001 SPECIMEN HANDLING PT-LAB: CPT

## 2024-06-13 PROBLEM — E04.2 MULTINODULAR GOITER: Status: ACTIVE | Noted: 2024-05-01

## 2024-07-24 ENCOUNTER — TELEPHONE (OUTPATIENT)
Facility: CLINIC | Age: 74
End: 2024-07-24

## 2024-07-24 DIAGNOSIS — F41.9 ANXIETY: Primary | ICD-10-CM

## 2024-07-24 NOTE — TELEPHONE ENCOUNTER
Please [facility] and [id number] - [place], [place] - [address] - P [phone] - F [phone] [[id number]]     diazePAM (VALIUM) 5 MG tablet

## 2024-07-25 RX ORDER — DIAZEPAM 5 MG/1
5 TABLET ORAL EVERY 8 HOURS PRN
Qty: 40 TABLET | Refills: 1 | Status: SHIPPED | OUTPATIENT
Start: 2024-07-25 | End: 2024-09-23

## 2024-07-30 DIAGNOSIS — F41.9 ANXIETY: ICD-10-CM

## 2024-07-30 NOTE — TELEPHONE ENCOUNTER
Pt's spouse requesting refill to be sent to the following pharmacy:    diazePAM 5 mg tablet    [facility]    [address]    [phone]      Pt's spouse stated that the above medication ws sent to the wrong pharmacy; please send to the above [facility]

## 2024-07-31 RX ORDER — DIAZEPAM 5 MG/1
5 TABLET ORAL EVERY 8 HOURS PRN
Qty: 40 TABLET | Refills: 1 | Status: SHIPPED | OUTPATIENT
Start: 2024-07-31 | End: 2024-09-29

## 2024-07-31 RX ORDER — SEMAGLUTIDE 0.68 MG/ML
0.5 INJECTION, SOLUTION SUBCUTANEOUS
Qty: 3 ML | Refills: 11 | Status: SHIPPED | OUTPATIENT
Start: 2024-07-31

## 2024-07-31 NOTE — TELEPHONE ENCOUNTER
Patient needs it sent to the correct pharmacy.    [facility]     [address] [place] [address]     [phone]

## 2024-08-22 ENCOUNTER — TELEPHONE (OUTPATIENT)
Facility: CLINIC | Age: 74
End: 2024-08-22

## 2024-08-22 NOTE — TELEPHONE ENCOUNTER
Pt stated that she thinks she has a UTI.   Pt is experiencing Urgency, burning, discomfort. Symptoms started 3 days ago.   Pt would like to bring in a urine sample.     Please advise and input orders if appropriate.

## 2024-08-26 NOTE — TELEPHONE ENCOUNTER
Pt declined a nurse visit today, she states that using AZO and drinking a lot of water has helped with her symptoms     No further action required

## 2024-10-09 DIAGNOSIS — F41.9 ANXIETY: Primary | ICD-10-CM

## 2024-10-09 RX ORDER — DIAZEPAM 5 MG
5 TABLET ORAL EVERY 8 HOURS PRN
Qty: 40 TABLET | Refills: 0 | Status: SHIPPED | OUTPATIENT
Start: 2024-10-09 | End: 2024-12-08

## 2024-10-09 NOTE — TELEPHONE ENCOUNTER
[facility]  [place] reviewed    The last fill date for Diazepam was 09/06/2024 for a 13 d/s with qty 40    Last UDS: Ordered, not completed  CSA Last signed: 05/14/2024    PCP: Ruel Freed MD    Last appointment: 06/06/2024  Future Appointments   Date Time Provider Department Center   11/8/2024  7:45 AM [facility] LAB VISIT [facility] DEP   11/14/2024  9:00 AM Ruel Freed MD [facility] DEP       Requested Prescriptions     Pending Prescriptions Disp Refills    diazePAM (VALIUM) 5 MG tablet 40 tablet 1     Sig: Take 1 tablet by mouth every 8 hours as needed for Anxiety or Sleep for up to 60 days. Max Daily Amount: 15 mg

## 2024-11-08 ENCOUNTER — HOSPITAL ENCOUNTER (OUTPATIENT)
Facility: HOSPITAL | Age: 74
Setting detail: SPECIMEN
Discharge: HOME | End: 2024-11-11

## 2024-11-09 LAB
ANION GAP SERPL CALCULATED.3IONS-SCNC: 10 MMOL/L (ref 3–15)
BUN BLDV-MCNC: 19 MG/DL (ref 6–22)
CALCIUM SERPL-MCNC: 9.6 MG/DL (ref 8.4–10.5)
CHLORIDE BLD-SCNC: 105 MMOL/L (ref 98–110)
CHOLESTEROL, TOTAL: 184 MG/DL (ref 110–200)
CHOLESTEROL/HDL RATIO: 2.8 (ref 0–5)
CO2: 26 MMOL/L (ref 20–32)
CREAT SERPL-MCNC: 0.6 MG/DL (ref 0.8–1.4)
ESTIMATED AVERAGE GLUCOSE: 136 MG/DL (ref 91–123)
GFR, ESTIMATED: >60 ML/MIN/1.73 SQ.M.
GLUCOSE: 122 MG/DL (ref 70–99)
HBA1C MFR BLD: 6.4 % (ref 4.8–5.6)
HDLC SERPL-MCNC: 66 MG/DL
LDL CHOLESTEROL: 89 MG/DL (ref 50–99)
LDL/HDL RATIO: 1.3
NON-HDL CHOLESTEROL: 118 MG/DL
POTASSIUM SERPL-SCNC: 4.6 MMOL/L (ref 3.5–5.5)
SODIUM BLD-SCNC: 141 MMOL/L (ref 133–145)
TRIGL SERPL-MCNC: 145 MG/DL (ref 40–149)
VITAMIN D 25-HYDROXY: 24.9 NG/ML (ref 32–100)
VLDLC SERPL CALC-MCNC: 29 MG/DL (ref 8–30)

## 2024-11-10 LAB — SENTARA SPECIMEN COLLECTION: NORMAL

## 2024-11-10 PROCEDURE — 99001 SPECIMEN HANDLING PT-LAB: CPT

## 2024-11-10 NOTE — PROGRESS NOTES
74 y.o. female who presents for evaluation     She is now being followed by NP Stevie/Charlene for the breast ca and has appt in the next couple weeks    Continues to do at least 6-8k steps daily on her counter, no cardiovascular complaints.      Denies polyuria, polydipsia, nocturia, vision change.  PPS sugar in the 120-130 range, doing well on ozempic 0.5.       1/5/2023 5/11/2023 11/9/2023 5/14/2024 6/6/2024 11/14/2024   Vitals         Weight - Scale 143 lb  128 lb  122 lb 12.8 oz  125 lb  127 lb  128 lb      Remains on PPI and iron supplementation     She has not seen Dr. DONNIE De Anda for bronchiectasis in years, remains on Advair as needed.     No sx referable to the thyroid, she is down to yearly follow up with Dr Nicolas    Doing better with taking the prava regularly    Wondering if she can do trial of celebrex for arthralgias    LAST MEDICARE WELLNESS EXAM: never as not medicare b    Past Medical History:   Diagnosis Date    ADD (attention deficit disorder) 2014    [facility] psych    Amebic colitis 1970s    Anemia, iron deficiency 05/2018    eval Dr Mcgill showed gastritis    Breast cancer ([facility]) 02/2016    Dr Noguera; RIGHT partial mastectomy, adjuvant XRT; s/p tamoxifen>5yr    Bronchiectasis ([facility])     Dr. JAVED De Anda; RLL    COVID-19 vaccine dose declined     boosters    COVID-19 virus infection     12/20; 8/22; 10/22    Diverticulosis 12/2013    DM (diabetes mellitus) ([facility]) 09/2013    on basis of elev a1c; ozempic (1/23) start wt 143 lbs    Fibrocystic breast disease     Dr. Elder    GERD (gastroesophageal reflux disease)     Dr. Mcgill s/p dilation 2/12    H/O echocardiogram     NST neg, ef 75% (4/14)    H/O pulmonary function tests 2011    ratio 70, FEV1 81 no change postbd, TLC 93, RV 94, DLCO 81    Hyperlipidemia     Insomnia     Multinodular goiter 05/2024    Dr Rowley 6/24; LEFT 2.9cm TR1 cyst, 2.4cm TR3; serial f/u    Osteoarthritis     knee s/p cortisone; SI joint on xray 7/21    Osteopenia

## 2024-11-11 SDOH — ECONOMIC STABILITY: TRANSPORTATION INSECURITY
IN THE PAST 12 MONTHS, HAS LACK OF TRANSPORTATION KEPT YOU FROM MEETINGS, WORK, OR FROM GETTING THINGS NEEDED FOR DAILY LIVING?: PATIENT DECLINED

## 2024-11-11 SDOH — ECONOMIC STABILITY: FOOD INSECURITY: WITHIN THE PAST 12 MONTHS, THE FOOD YOU BOUGHT JUST DIDN'T LAST AND YOU DIDN'T HAVE MONEY TO GET MORE.: PATIENT DECLINED

## 2024-11-11 SDOH — ECONOMIC STABILITY: FOOD INSECURITY: WITHIN THE PAST 12 MONTHS, YOU WORRIED THAT YOUR FOOD WOULD RUN OUT BEFORE YOU GOT MONEY TO BUY MORE.: PATIENT DECLINED

## 2024-11-11 SDOH — ECONOMIC STABILITY: INCOME INSECURITY: HOW HARD IS IT FOR YOU TO PAY FOR THE VERY BASICS LIKE FOOD, HOUSING, MEDICAL CARE, AND HEATING?: PATIENT DECLINED

## 2024-11-14 ENCOUNTER — OFFICE VISIT (OUTPATIENT)
Facility: CLINIC | Age: 74
End: 2024-11-14

## 2024-11-14 VITALS
DIASTOLIC BLOOD PRESSURE: 68 MMHG | SYSTOLIC BLOOD PRESSURE: 120 MMHG | TEMPERATURE: 98.3 F | HEIGHT: 63 IN | HEART RATE: 94 BPM | OXYGEN SATURATION: 96 % | WEIGHT: 128 LBS | RESPIRATION RATE: 16 BRPM | BODY MASS INDEX: 22.68 KG/M2

## 2024-11-14 DIAGNOSIS — F41.9 ANXIETY: ICD-10-CM

## 2024-11-14 DIAGNOSIS — Z85.3 HISTORY OF BREAST CANCER: ICD-10-CM

## 2024-11-14 DIAGNOSIS — E78.5 HYPERLIPIDEMIA, UNSPECIFIED HYPERLIPIDEMIA TYPE: ICD-10-CM

## 2024-11-14 DIAGNOSIS — E55.9 VITAMIN D DEFICIENCY: ICD-10-CM

## 2024-11-14 DIAGNOSIS — K21.9 GASTROESOPHAGEAL REFLUX DISEASE WITHOUT ESOPHAGITIS: ICD-10-CM

## 2024-11-14 DIAGNOSIS — E11.9 CONTROLLED TYPE 2 DIABETES MELLITUS WITHOUT COMPLICATION, WITHOUT LONG-TERM CURRENT USE OF INSULIN (HCC): ICD-10-CM

## 2024-11-14 DIAGNOSIS — E04.2 MULTINODULAR GOITER: ICD-10-CM

## 2024-11-14 DIAGNOSIS — E11.9 CONTROLLED TYPE 2 DIABETES MELLITUS WITHOUT COMPLICATION, WITHOUT LONG-TERM CURRENT USE OF INSULIN (HCC): Primary | ICD-10-CM

## 2024-11-14 RX ORDER — DIAZEPAM 5 MG/1
5 TABLET ORAL EVERY 8 HOURS PRN
Qty: 40 TABLET | Refills: 0 | Status: CANCELLED | OUTPATIENT
Start: 2024-11-14 | End: 2025-01-13

## 2024-11-14 NOTE — PROGRESS NOTES
Odalis Brewster presents today for   Chief Complaint   Patient presents with    6 Month Follow-Up    Diabetes       \"Have you been to the ER, urgent care clinic since your last visit?  Hospitalized since your last visit?\"    YES - When: approximately 3  weeks ago.  Where and Why: Urgent Care, sore throat.    “Have you seen or consulted any other health care providers outside of [facility] since your last visit?”    YES - When: approximately 5 months ago.  Where and Why: [facility], weight (BMI).              0142

## 2024-11-18 ENCOUNTER — TELEPHONE (OUTPATIENT)
Facility: CLINIC | Age: 74
End: 2024-11-18

## 2024-11-18 DIAGNOSIS — F41.9 ANXIETY: ICD-10-CM

## 2024-11-18 RX ORDER — DIAZEPAM 5 MG/1
5 TABLET ORAL EVERY 8 HOURS PRN
Qty: 40 TABLET | Refills: 0 | Status: SHIPPED | OUTPATIENT
Start: 2024-11-18 | End: 2025-01-17

## 2024-11-18 RX ORDER — CELECOXIB 100 MG/1
CAPSULE ORAL
Qty: 60 CAPSULE | Refills: 0 | Status: SHIPPED | OUTPATIENT
Start: 2024-11-18

## 2024-11-18 NOTE — TELEPHONE ENCOUNTER
Diazepam pended at recent visit; vitamin D and Celebrex not on patient's current medication list.    Please view and advise.

## 2024-12-20 DIAGNOSIS — F41.9 ANXIETY: ICD-10-CM

## 2024-12-20 RX ORDER — DIAZEPAM 5 MG/1
5 TABLET ORAL EVERY 8 HOURS PRN
Qty: 15 TABLET | Refills: 0 | Status: SHIPPED | OUTPATIENT
Start: 2024-12-20 | End: 2025-02-18

## 2024-12-20 NOTE — TELEPHONE ENCOUNTER
[facility]  report reviewed    The last fill date for Diazepam was 11/18/2024 for a 14 d/s with qty 40    Last UDS: 05/02/2024   CSA Last signed: 05/14/2024     PCP: Ruel Freed MD    Last appointment: 11/14/2024  Future Appointments   Date Time Provider Department Center   5/13/2025  8:15 AM [facility] LAB VISIT [facility] DEP   5/20/2025  8:40 AM Ruel Freed MD [facility] DEP       Requested Prescriptions     Pending Prescriptions Disp Refills    diazePAM (VALIUM) 5 MG tablet 40 tablet 0     Sig: Take 1 tablet by mouth every 8 hours as needed for Anxiety or Sleep for up to 60 days. Max Daily Amount: 15 mg

## 2024-12-23 RX ORDER — CELECOXIB 100 MG/1
CAPSULE ORAL
Qty: 60 CAPSULE | Refills: 0 | Status: SHIPPED | OUTPATIENT
Start: 2024-12-23

## 2024-12-23 NOTE — TELEPHONE ENCOUNTER
PCP: Ruel Freed MD    LAST OFFICE VISIT: 11/14/2024    LAST REFILL PER CHART:  Medication:celecoxib (CELEBREX) 100 MG capsule   Ordered On:11/18/2024  Instructions:1-2x/day as needed for pain   Dispense:60 capsules  Refills:0      Future Appointments   Date Time Provider Department Center   5/13/2025  8:15 AM [facility] LAB VISIT [facility] DEP   5/20/2025  8:40 AM Ruel Freed MD [facility] DEP

## 2025-01-16 ENCOUNTER — TELEMEDICINE (OUTPATIENT)
Facility: CLINIC | Age: 75
End: 2025-01-16
Payer: COMMERCIAL

## 2025-01-16 ENCOUNTER — TELEPHONE (OUTPATIENT)
Facility: CLINIC | Age: 75
End: 2025-01-16

## 2025-01-16 DIAGNOSIS — R06.2 WHEEZING: Primary | ICD-10-CM

## 2025-01-16 DIAGNOSIS — F41.9 ANXIETY: ICD-10-CM

## 2025-01-16 DIAGNOSIS — J40 BRONCHITIS: ICD-10-CM

## 2025-01-16 PROCEDURE — 1123F ACP DISCUSS/DSCN MKR DOCD: CPT | Performed by: INTERNAL MEDICINE

## 2025-01-16 PROCEDURE — 99213 OFFICE O/P EST LOW 20 MIN: CPT | Performed by: INTERNAL MEDICINE

## 2025-01-16 RX ORDER — AZITHROMYCIN 250 MG/1
TABLET, FILM COATED ORAL
Qty: 6 TABLET | Refills: 0 | Status: SHIPPED | OUTPATIENT
Start: 2025-01-16 | End: 2025-01-26

## 2025-01-16 RX ORDER — PREDNISONE 20 MG/1
20 TABLET ORAL DAILY
Qty: 5 TABLET | Refills: 0 | Status: SHIPPED | OUTPATIENT
Start: 2025-01-16 | End: 2025-01-21

## 2025-01-16 RX ORDER — DIAZEPAM 5 MG/1
5 TABLET ORAL EVERY 8 HOURS PRN
Qty: 40 TABLET | Refills: 0 | Status: SHIPPED | OUTPATIENT
Start: 2025-01-16 | End: 2025-02-15

## 2025-01-16 RX ORDER — ALBUTEROL SULFATE 90 UG/1
2 INHALANT RESPIRATORY (INHALATION) EVERY 6 HOURS PRN
Qty: 18 G | Refills: 5 | Status: SHIPPED | OUTPATIENT
Start: 2025-01-16

## 2025-01-16 NOTE — PROGRESS NOTES
0.5MG/DOS, (OZEMPIC, 0.25 OR 0.5 MG/DOSE,) 2 MG/3ML SOPN INJECT 0.5 MG INTO THE SKIN EVERY 7 DAYS    pramipexole (MIRAPEX) 0.5 MG tablet Take 1 tablet by mouth nightly    pantoprazole (PROTONIX) 40 MG tablet TAKE 1 TABLET BY MOUTH TWICE A DAY BEFORE MEALS    pravastatin (PRAVACHOL) 40 MG tablet TAKE 1 TABLET BY MOUTH EVERY DAY AT NIGHT    fluticasone-salmeterol (ADVAIR DISKUS) 250-50 MCG/ACT AEPB diskus inhaler TAKE 1 PUFF BY INHALATION TWO (2) TIMES A DAY. (Patient not taking: Reported on 11/14/2024)    blood glucose monitor kit and supplies Dispense sufficient amount for indicated testing frequency plus additional to accommodate PRN testing needs. Dispense all needed supplies to include: monitor, strips, lancing device, lancets, control solutions, alcohol swabs.    blood glucose monitor strips Test 2 times a day & as needed for symptoms of irregular blood glucose. Dispense sufficient amount for indicated testing frequency plus additional to accommodate PRN testing needs.    Lancets MISC Dispense lancets covered by patient's insurance.     No current facility-administered medications for this visit.       Allergies   Allergen Reactions    Atorvastatin Myalgia    Empagliflozin Other (See Comments)     Recurring yeast infections    Metformin Diarrhea    Moxifloxacin Other (See Comments)     Felt jittery    Nitrofurantoin Hives    Nsaids Other (See Comments)     H/o gastropathy    Phentermine Other (See Comments)     Felt bad    Pioglitazone Other (See Comments)     Felt bad, weight gain    Raloxifene Other (See Comments)     Severe flashes    Rosuvastatin Myalgia           No flowsheet data found.   General: alert, cooperative, no distress   Mental  status: normal mood, behavior, speech, dress, motor activity, and thought processes, able to follow commands   HENT: NCAT   Neck: no visualized mass   Resp: no respiratory distress   Neuro: no gross deficits   Skin: no discoloration or lesions of concern on visible areas

## 2025-01-16 NOTE — TELEPHONE ENCOUNTER
Spoke with patient she is having headache,  heaviness in chest from congestion, coughing, headache, body aches, and no energy.  Patient states her  tested positive for flu.  Patient states she tested and was negative for flu and COVID.  Patient states she went to urgent care about 6 weeks ago and was treated for strep.     Patient wanted an appointment. Please advise.

## 2025-01-30 ENCOUNTER — OFFICE VISIT (OUTPATIENT)
Age: 75
End: 2025-01-30

## 2025-01-30 VITALS — HEIGHT: 63 IN | WEIGHT: 128 LBS | BODY MASS INDEX: 22.68 KG/M2

## 2025-01-30 DIAGNOSIS — M25.552 PAIN OF LEFT HIP: ICD-10-CM

## 2025-01-30 DIAGNOSIS — M16.12 PRIMARY OSTEOARTHRITIS OF LEFT HIP: Primary | ICD-10-CM

## 2025-01-30 DIAGNOSIS — M54.50 LOW BACK PAIN, UNSPECIFIED BACK PAIN LATERALITY, UNSPECIFIED CHRONICITY, UNSPECIFIED WHETHER SCIATICA PRESENT: ICD-10-CM

## 2025-01-30 RX ORDER — BETAMETHASONE SODIUM PHOSPHATE AND BETAMETHASONE ACETATE 3; 3 MG/ML; MG/ML
6 INJECTION, SUSPENSION INTRA-ARTICULAR; INTRALESIONAL; INTRAMUSCULAR; SOFT TISSUE ONCE
Status: SHIPPED | OUTPATIENT
Start: 2025-01-30

## 2025-01-30 NOTE — PROGRESS NOTES
Patient: Odalis Brewster                MRN: [id number]       SSN: [id number]  YOB: 1950        AGE: 74 y.o.        SEX: female  Body mass index is 22.67 kg/m².    PCP: Ruel Freed MD  01/30/25      Odalis is here today complaining of left lateral hip pain not too much in the way of groin pain and also low back pain when she watches TV for a long period of time should rather stand and sit she notices the left leg will go numb on her including the foot depends on the positioning but often with sitting denies much in the way of groin pain not too much difficulty putting shoes and socks on denies fevers chills night sweats or weight loss and the exam today nice lady she is walking well holds her back stiffly she has significant decrease sensation to L4 and L5 tib ant and EHL both are 5- out of 5 straight leg raise is just equivocal the left hip to touch stiff with internal rotation and but not severely so but she is tender significantly over the greater trochanter little bit in the insertional tendon as well of the abductors and also the low back    At this point I recommend MRI for the lumbar spine and I recommend a bursal injection for the left hip      X-ray AP pelvis January 30, 2025 moderate arthritis of the lumbar spine especially at L5-S1 mild to moderate arthritis of both hips with the just mild dysplasia medial wear pattern but not severely so    REVIEW OF SYSTEMS:      CON: negative  EYE: negative   ENT: negative  RESP: negative  GI:    negative   :  negative  MSK: Positive  A twelve point review of systems was completed, positives noted and all other systems were reviewed and are negative          Past Medical History:   Diagnosis Date    ADD (attention deficit disorder) 2014    [facility] psych    Amebic colitis 1970s    Anemia, iron deficiency 05/2018    eval Dr Mcgill showed gastritis    Breast cancer (HCC) 02/2016    Dr Noguera; RIGHT partial mastectomy, adjuvant XRT;

## 2025-02-06 ENCOUNTER — HOSPITAL ENCOUNTER (OUTPATIENT)
Facility: HOSPITAL | Age: 75
Discharge: HOME | End: 2025-02-09
Attending: ORTHOPAEDIC SURGERY
Payer: COMMERCIAL

## 2025-02-06 DIAGNOSIS — M54.50 LOW BACK PAIN, UNSPECIFIED BACK PAIN LATERALITY, UNSPECIFIED CHRONICITY, UNSPECIFIED WHETHER SCIATICA PRESENT: ICD-10-CM

## 2025-02-06 PROCEDURE — 72148 MRI LUMBAR SPINE W/O DYE: CPT

## 2025-02-17 ENCOUNTER — TELEPHONE (OUTPATIENT)
Facility: CLINIC | Age: 75
End: 2025-02-17

## 2025-02-17 DIAGNOSIS — R31.9 HEMATURIA, UNSPECIFIED TYPE: ICD-10-CM

## 2025-02-17 DIAGNOSIS — F41.9 ANXIETY: Primary | ICD-10-CM

## 2025-02-17 RX ORDER — DIAZEPAM 5 MG/1
5 TABLET ORAL EVERY 8 HOURS PRN
Qty: 40 TABLET | Refills: 0 | Status: SHIPPED | OUTPATIENT
Start: 2025-02-17 | End: 2025-03-19

## 2025-02-17 NOTE — TELEPHONE ENCOUNTER
Patient contacted the office asking for a refill of diazePAM (VALIUM) 5 MG tablet [[id number]] to   [facility]/pharmacy #[id number] - [facility] [address]      Patient also said she was seen at [facility] for a UTI about a month ago, they put her on an antibiotic and it cleared up. They also noticed blood in her urine and advised her to have her urine retested in a month with her pcp. Can we order a UA for pt?

## 2025-02-17 NOTE — TELEPHONE ENCOUNTER
Valium sent    Ok for urine dip     Diagnosis Orders   1. Anxiety  diazePAM (VALIUM) 5 MG tablet      2. Hematuria, unspecified type  AMB POC URINALYSIS DIP STICK AUTO W/ MICRO

## 2025-02-26 ENCOUNTER — OFFICE VISIT (OUTPATIENT)
Age: 75
End: 2025-02-26
Payer: COMMERCIAL

## 2025-02-26 VITALS — HEIGHT: 63 IN | WEIGHT: 134 LBS | BODY MASS INDEX: 23.74 KG/M2

## 2025-02-26 DIAGNOSIS — M54.50 LOW BACK PAIN, UNSPECIFIED BACK PAIN LATERALITY, UNSPECIFIED CHRONICITY, UNSPECIFIED WHETHER SCIATICA PRESENT: Primary | ICD-10-CM

## 2025-02-26 PROCEDURE — 99214 OFFICE O/P EST MOD 30 MIN: CPT | Performed by: ORTHOPAEDIC SURGERY

## 2025-02-26 PROCEDURE — 1123F ACP DISCUSS/DSCN MKR DOCD: CPT | Performed by: ORTHOPAEDIC SURGERY

## 2025-02-26 NOTE — PROGRESS NOTES
Patient: Odalis Brewster                MRN: [id number]       SSN: [id number]  YOB: 1950        AGE: 74 y.o.        SEX: female  Body mass index is 23.74 kg/m².    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 I written it down for she may want to explore that with primary care    Exam today she is walking well both hips rotate nicely she reports that the bursa Citic pain is much improved and I examined both hips they rotate well relatively nontender I did a detailed neuroexam on the right side and there is no foot drop tib ant EHL 5 out of 5 calf nontender Homans' sign is negative sensation seems grossly intact as well I going to send her to the spine center hopefully she is nonoperative for the back right paracentral disc herniation at L5-S1 and with regards to the hip no indication for surgery she may require an additional injection for the for the hips and we will see her    Social determinants of health again reviewed no negative factors affecting patient care I will see her back in a couple months time to reassess the bursitis it has been a pleasure to share in her care spine center referral    REVIEW OF SYSTEMS:      CON: negative  EYE: negative   ENT: negative  RESP: negative  GI:    negative   :  negative  MSK: Positive  A twelve point review of systems was completed, positives noted and all other systems were reviewed and are negative          Past Medical History:   Diagnosis Date    ADD (attention deficit disorder) 2014    [facility] psych    Amebic colitis 1970s    Anemia, iron deficiency 05/2018    eval Dr Mcgill showed gastritis    Breast cancer (HCC) 02/2016    Dr Noguera; RIGHT partial mastectomy, adjuvant

## 2025-03-04 SDOH — HEALTH STABILITY: PHYSICAL HEALTH: ON AVERAGE, HOW MANY MINUTES DO YOU ENGAGE IN EXERCISE AT THIS LEVEL?: 20 MIN

## 2025-03-04 SDOH — HEALTH STABILITY: PHYSICAL HEALTH: ON AVERAGE, HOW MANY DAYS PER WEEK DO YOU ENGAGE IN MODERATE TO STRENUOUS EXERCISE (LIKE A BRISK WALK)?: 3 DAYS

## 2025-03-06 ENCOUNTER — OFFICE VISIT (OUTPATIENT)
Age: 75
End: 2025-03-06
Payer: COMMERCIAL

## 2025-03-06 VITALS
TEMPERATURE: 98 F | SYSTOLIC BLOOD PRESSURE: 140 MMHG | WEIGHT: 133.4 LBS | HEIGHT: 63 IN | DIASTOLIC BLOOD PRESSURE: 70 MMHG | OXYGEN SATURATION: 97 % | HEART RATE: 82 BPM | BODY MASS INDEX: 23.64 KG/M2

## 2025-03-06 DIAGNOSIS — G57.02 PIRIFORMIS SYNDROME OF LEFT SIDE: Primary | ICD-10-CM

## 2025-03-06 DIAGNOSIS — M40.04 POSTURAL KYPHOSIS OF THORACIC REGION: ICD-10-CM

## 2025-03-06 DIAGNOSIS — M62.85 DYSFUNCTION OF THE MULTIFIDUS MUSCLE OF LUMBAR REGION: ICD-10-CM

## 2025-03-06 DIAGNOSIS — M47.816 LUMBAR SPONDYLOSIS: ICD-10-CM

## 2025-03-06 DIAGNOSIS — E13.42 NEURALGIA DUE TO SECONDARY DIABETES (HCC): ICD-10-CM

## 2025-03-06 DIAGNOSIS — M51.27 PROTRUSION OF INTERVERTEBRAL DISC OF LUMBOSACRAL REGION: ICD-10-CM

## 2025-03-06 PROCEDURE — 1123F ACP DISCUSS/DSCN MKR DOCD: CPT | Performed by: PHYSICAL MEDICINE & REHABILITATION

## 2025-03-06 PROCEDURE — 99204 OFFICE O/P NEW MOD 45 MIN: CPT | Performed by: PHYSICAL MEDICINE & REHABILITATION

## 2025-03-06 RX ORDER — PREGABALIN 50 MG/1
CAPSULE ORAL
Qty: 60 CAPSULE | Refills: 2 | Status: SHIPPED | OUTPATIENT
Start: 2025-03-06 | End: 2025-06-04

## 2025-03-06 NOTE — PATIENT INSTRUCTIONS
as of: July 31, 2024  Content Version: 14.3  © 2024 [employer].   Care instructions adapted under license by [facility]. If you have questions about a medical condition or this instruction, always ask your healthcare professional. [employer], disclaims any warranty or liability for your use of this information.

## 2025-03-06 NOTE — PROGRESS NOTES
Odalis Sonia Brewster presents today for   Chief Complaint   Patient presents with    Back Pain     LBP  BLE L>R       Is someone accompanying this pt? no    Is the patient using any DME equipment during OV? no      Coordination of Care:  1. Have you been to the ER, urgent care clinic since your last visit? Yes, [facility]  Hospitalized since your last visit? no    2. Have you seen or consulted any other health care providers outside of the [facility] since your last visit? no Include any pap smears or colon screening. no    
EVERY 7 DAYS 3 mL 11    pantoprazole (PROTONIX) 40 MG tablet TAKE 1 TABLET BY MOUTH TWICE A DAY BEFORE MEALS 180 tablet 3    pravastatin (PRAVACHOL) 40 MG tablet TAKE 1 TABLET BY MOUTH EVERY DAY AT NIGHT 90 tablet 3    fluticasone-salmeterol (ADVAIR DISKUS) 250-50 MCG/ACT AEPB diskus inhaler TAKE 1 PUFF BY INHALATION TWO (2) TIMES A DAY. 60 each 0    blood glucose monitor kit and supplies Dispense sufficient amount for indicated testing frequency plus additional to accommodate PRN testing needs. Dispense all needed supplies to include: monitor, strips, lancing device, lancets, control solutions, alcohol swabs. 1 kit 0    blood glucose monitor strips Test 2 times a day & as needed for symptoms of irregular blood glucose. Dispense sufficient amount for indicated testing frequency plus additional to accommodate PRN testing needs. 200 strip 11    Lancets MISC Dispense lancets covered by patient's insurance. 100 each 5    pramipexole (MIRAPEX) 0.5 MG tablet Take 1 tablet by mouth nightly 90 tablet 3     Current Facility-Administered Medications   Medication Dose Route Frequency Provider Last Rate Last Admin    betamethasone acetate-betamethasone sodium phosphate (CELESTONE) injection 6 mg  6 mg Other Once              By signing my name below, I, ASUNCION Vega, attest that this documentation has been prepared under the direction and in the presence of MELISSA DUNBAR MD  Electronically signed: ASUNCION Vega. 3/6/25 12:08 PM EST     I, Melissa Dunbar MD, personally performed the services described in this documentation. I have authorized the scribe to complete the medical record entries input within this chart. I have reviewed the chart and agree that the record reflects my personal performance and is accurate and complete. [Electronically Signed: Melissa Dunbar MD. 3/6/2025 4:53 PM ]    Portions of this note was created using Dragon transcription software and may contain unintended errors.

## 2025-03-25 ENCOUNTER — HOSPITAL ENCOUNTER (OUTPATIENT)
Facility: HOSPITAL | Age: 75
Setting detail: RECURRING SERIES
Discharge: HOME | End: 2025-03-28
Attending: PHYSICAL MEDICINE & REHABILITATION
Payer: COMMERCIAL

## 2025-03-25 PROCEDURE — 97163 PT EVAL HIGH COMPLEX 45 MIN: CPT

## 2025-03-25 NOTE — PROGRESS NOTES
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    Current symptoms/Complaints: Pain to L/S with radicular paraesthesia and pain down left LE. Reports she was told that the pain is \"on the wrong side compared to the MRI. \"  Reports she does have some symptoms to right but due to her RLS it is difficult to determine if that's the actual cause or related to her L/S.    PLOF: functionally independent    Symptoms:  Aggravated by:   [] Bending [x] Sitting > 30' [] Standing > 30'-45' [] Walking   [] Moving [] Cough [] Sneeze [] Valsalva   [] AM  [] PM  Lying:  [] sup   [] pro   [] sidelying   [x] Other: driving/riding, stair negotiation (feels weaker on left)     Eased by:    [] Bending [] Sitting [x] Standing (vs sitting) [x] Walking   [] Moving [] AM  [] PM  Lying: [] sup  [] pro  [] sidelying   [] Other: When woken up by pain, reports will pace in the middle of the night to alleviate symptoms    Continued use makes the pain:  [] Better []  Worse []  No effect     Pain at rest: [] No    [x] Yes       Previous Treatment/Compliance: no prior official treatment    PMHx/Surgical Hx: unremarkable    Diagnostic Tests: [] Lab work [] X-rays    [] 
Signature:_________________________   DATE:_________   TIME:________                           Melissa Dunbar MD    ** Signature, Date and Time must be completed for valid certification **  [facility] or you may fax the signed copy to [phone].  Thank you.

## 2025-03-26 DIAGNOSIS — F41.9 ANXIETY: Primary | ICD-10-CM

## 2025-03-26 NOTE — TELEPHONE ENCOUNTER
PCP: Ruel Freed MD    Refill Request     LAST OFFICE VISIT: 1/16/25      Medication: diazePAM (VALIUM) 5 MG tablet   pravastatin (PRAVACHOL) 40 MG tablet    Dispense: 40 tablet  90 tablet      Pharmacy [facility]/pharmacy #[id number] [facility] [address] [phone] - F [phone]      Future Appointments   Date Time Provider Department Center   4/1/2025  1:40 PM Ruel Freed MD [facility]   4/9/2025  8:40 AM Gabriel Crowe, PT [facility]   5/1/2025  4:00 PM Melissa Dunbar MD [facility] BS AMB   5/13/2025  8:15 AM IOC LAB VISIT [facility]   5/20/2025  8:40 AM Ruel Freed MD [facility]   5/28/2025  1:15 PM Nghia Bowman MD [facility] BS AMB

## 2025-03-26 NOTE — TELEPHONE ENCOUNTER
[facility]  report reviewed    The last fill date for Diazepam was 02/17/2025 for a 13 d/s with qty 40    Last UDS: Not on file   CSA Last signed: 05/14/2024     PCP: Ruel Freed MD    Last appointment: 11/14/2024  Future Appointments   Date Time Provider Department Center   4/1/2025  1:40 PM Ruel Freed MD [facility] DEP   4/9/2025  8:40 AM Gabriel Crowe, PT [facility]   5/1/2025  4:00 PM Melissa Dunbar MD [facility] BS AMB   5/13/2025  8:15 AM IOC LAB VISIT [facility] DEP   5/20/2025  8:40 AM Ruel Freed MD [facility] DEP   5/28/2025  1:15 PM Nghia Bowman MD [facility] BS AMB       Requested Prescriptions     Pending Prescriptions Disp Refills    diazePAM (VALIUM) 5 MG tablet 40 tablet 0     Sig: Take 1 tablet by mouth every 8 hours as needed for Anxiety for up to 30 days. Max Daily Amount: 15 mg

## 2025-03-27 RX ORDER — DIAZEPAM 5 MG/1
5 TABLET ORAL EVERY 8 HOURS PRN
Qty: 40 TABLET | Refills: 0 | Status: SHIPPED | OUTPATIENT
Start: 2025-03-27 | End: 2025-04-26

## 2025-03-31 ENCOUNTER — TELEPHONE (OUTPATIENT)
Facility: CLINIC | Age: 75
End: 2025-03-31

## 2025-03-31 DIAGNOSIS — E78.5 HYPERLIPIDEMIA, UNSPECIFIED: ICD-10-CM

## 2025-03-31 RX ORDER — PRAVASTATIN SODIUM 40 MG
40 TABLET ORAL NIGHTLY
Qty: 90 TABLET | Refills: 3 | Status: SHIPPED | OUTPATIENT
Start: 2025-03-31

## 2025-03-31 NOTE — TELEPHONE ENCOUNTER
Refill request via fax     Medication:   Pravestatin   Quantity: 90  Pharmacy: [facility]   Last Fill: 12/29/2023    PCP: Ruel Freed MD    LAST OFFICE VISIT: 11/14/2024      Future Appointments   Date Time Provider Department Center   4/9/2025  8:40 AM Behrens, Laura M, [facility]   5/1/2025  4:00 PM Melissa Dunbar MD [facility] BS AMB   5/13/2025  8:15 AM IOC LAB VISIT [facility] DEP   5/20/2025  8:40 AM Ruel Freed MD [facility]   5/28/2025  1:15 PM Nghia Bowman MD [facility] BS AMB

## 2025-05-13 ENCOUNTER — HOSPITAL ENCOUNTER (OUTPATIENT)
Facility: HOSPITAL | Age: 75
Setting detail: SPECIMEN
Discharge: HOME | End: 2025-05-16

## 2025-05-13 LAB
CHOLESTEROL, TOTAL: 190 MG/DL (ref 110–200)
CHOLESTEROL/HDL RATIO: 3 (ref 0–5)
HDLC SERPL-MCNC: 63 MG/DL
LDL CHOLESTEROL: 100 MG/DL (ref 50–99)
LDL/HDL RATIO: 1.6
NON-HDL CHOLESTEROL: 127 MG/DL
TRIGL SERPL-MCNC: 133 MG/DL (ref 40–149)
VITAMIN D 25-HYDROXY: 23.9 NG/ML (ref 32–100)
VLDLC SERPL CALC-MCNC: 27 MG/DL (ref 8–30)

## 2025-05-13 PROCEDURE — 99001 SPECIMEN HANDLING PT-LAB: CPT

## 2025-05-14 LAB
A/G RATIO: 2.3 RATIO (ref 1.1–2.6)
ALBUMIN: 4.6 G/DL (ref 3.5–5)
ALP BLD-CCNC: 64 U/L (ref 40–120)
ALT SERPL-CCNC: 19 U/L (ref 5–40)
ANION GAP SERPL CALCULATED.3IONS-SCNC: 14 MMOL/L (ref 3–15)
AST SERPL-CCNC: 19 U/L (ref 10–37)
BASOPHILS # BLD: 1 % (ref 0–2)
BASOPHILS ABSOLUTE: 0 K/UL (ref 0–0.2)
BILIRUB SERPL-MCNC: 0.3 MG/DL (ref 0.2–1.2)
BUN BLDV-MCNC: 14 MG/DL (ref 6–22)
CALCIUM SERPL-MCNC: 9.8 MG/DL (ref 8.4–10.5)
CHLORIDE BLD-SCNC: 103 MMOL/L (ref 98–110)
CO2: 24 MMOL/L (ref 20–32)
CREAT SERPL-MCNC: 0.8 MG/DL (ref 0.8–1.4)
EOSINOPHIL # BLD: 1 % (ref 0–6)
EOSINOPHILS ABSOLUTE: 0.1 K/UL (ref 0–0.5)
GFR, ESTIMATED: 72.7 ML/MIN/1.73 SQ.M.
GLOBULIN: 2 G/DL (ref 2–4)
GLUCOSE: 131 MG/DL (ref 70–99)
HCT VFR BLD CALC: 44.5 % (ref 35.1–48.3)
HEMOGLOBIN: 13.8 G/DL (ref 11.7–16.1)
LYMPHOCYTES # BLD: 42 % (ref 20–45)
LYMPHOCYTES ABSOLUTE: 1.8 K/UL (ref 1–4.8)
MCH RBC QN AUTO: 29 PG (ref 26–34)
MCHC RBC AUTO-ENTMCNC: 31 G/DL (ref 31–36)
MCV RBC AUTO: 94 FL (ref 80–99)
MONOCYTES ABSOLUTE: 0.4 K/UL (ref 0.1–1)
MONOCYTES: 8 % (ref 3–12)
NEUTROPHILS ABSOLUTE: 2.1 K/UL (ref 1.8–7.7)
NEUTROPHILS SEGMENTED: 47 % (ref 40–75)
NORMACHROMIC RBC: NORMAL
NORMACYTIC RBC: NORMAL
PDW BLD-RTO: 13.4 % (ref 10–15.5)
POTASSIUM SERPL-SCNC: 4.4 MMOL/L (ref 3.5–5.5)
RBC # BLD: 4.74 M/UL (ref 3.8–5.2)
SENTARA SPECIMEN COLLECTION: NORMAL
SMEAR REVIEW: NORMAL
SODIUM BLD-SCNC: 141 MMOL/L (ref 133–145)
TOTAL PROTEIN: 6.6 G/DL (ref 6.2–8.1)
WBC # BLD: 4.3 K/UL (ref 4–11)

## 2025-05-15 DIAGNOSIS — F41.9 ANXIETY: Primary | ICD-10-CM

## 2025-05-15 RX ORDER — DIAZEPAM 5 MG/1
5 TABLET ORAL EVERY 8 HOURS PRN
Qty: 40 TABLET | Refills: 0 | Status: SHIPPED | OUTPATIENT
Start: 2025-05-15 | End: 2025-06-14

## 2025-05-15 NOTE — TELEPHONE ENCOUNTER
[facility]  report reviewed    The last fill date for Diazepam was 03/27/2025 for a 30 d/s with qty 40    Last UDS: Not on file   CSA Last signed: 05/14/2024     PCP: Ruel Freed MD    Last appointment: 11/14/2024  Last virtual visit: 01/16/2025  Future Appointments   Date Time Provider Department Center   5/20/2025  8:40 AM Ruel Freed MD [facility]   5/28/2025  1:15 PM Nghia Bowman MD [facility]       Requested Prescriptions     Pending Prescriptions Disp Refills    diazePAM (VALIUM) 5 MG tablet 40 tablet 0     Sig: Take 1 tablet by mouth every 8 hours as needed for Anxiety for up to 30 days. Max Daily Amount: 15 mg

## 2025-05-15 NOTE — TELEPHONE ENCOUNTER
PCP: Ruel Freed MD    Refill Request Via Phone    LAST OFFICE VISIT: 11/14/24      Medication:   diazePAM (VALIUM) 5 MG tablet [[id number]]     Dispense: 40 tablet  Sig: Take 1 tablet by mouth every 8 hours as needed for Anxiety for up to 30 days. Max Daily Amount: 15 mg              Pharmacy [facility]/pharmacy #[id number] [place] [id number] [facility] [phone] - F [phone]       Future Appointments   Date Time Provider Department Center   5/20/2025  8:40 AM [facility]   5/28/2025  1:15 PM Nghia Bowman MD [facility] BS AMB

## 2025-05-17 SDOH — ECONOMIC STABILITY: TRANSPORTATION INSECURITY
IN THE PAST 12 MONTHS, HAS LACK OF TRANSPORTATION KEPT YOU FROM MEETINGS, WORK, OR FROM GETTING THINGS NEEDED FOR DAILY LIVING?: NO

## 2025-05-17 SDOH — HEALTH STABILITY: PHYSICAL HEALTH: ON AVERAGE, HOW MANY DAYS PER WEEK DO YOU ENGAGE IN MODERATE TO STRENUOUS EXERCISE (LIKE A BRISK WALK)?: 4 DAYS

## 2025-05-17 SDOH — ECONOMIC STABILITY: FOOD INSECURITY: WITHIN THE PAST 12 MONTHS, YOU WORRIED THAT YOUR FOOD WOULD RUN OUT BEFORE YOU GOT MONEY TO BUY MORE.: NEVER TRUE

## 2025-05-17 SDOH — ECONOMIC STABILITY: INCOME INSECURITY: IN THE LAST 12 MONTHS, WAS THERE A TIME WHEN YOU WERE NOT ABLE TO PAY THE MORTGAGE OR RENT ON TIME?: YES

## 2025-05-17 SDOH — ECONOMIC STABILITY: FOOD INSECURITY: WITHIN THE PAST 12 MONTHS, THE FOOD YOU BOUGHT JUST DIDN'T LAST AND YOU DIDN'T HAVE MONEY TO GET MORE.: NEVER TRUE

## 2025-05-17 SDOH — HEALTH STABILITY: PHYSICAL HEALTH: ON AVERAGE, HOW MANY MINUTES DO YOU ENGAGE IN EXERCISE AT THIS LEVEL?: 30 MIN

## 2025-05-17 SDOH — ECONOMIC STABILITY: TRANSPORTATION INSECURITY
IN THE PAST 12 MONTHS, HAS THE LACK OF TRANSPORTATION KEPT YOU FROM MEDICAL APPOINTMENTS OR FROM GETTING MEDICATIONS?: NO

## 2025-05-17 ASSESSMENT — PATIENT HEALTH QUESTIONNAIRE - PHQ9
2. FEELING DOWN, DEPRESSED OR HOPELESS: NOT AT ALL
SUM OF ALL RESPONSES TO PHQ QUESTIONS 1-9: 0
1. LITTLE INTEREST OR PLEASURE IN DOING THINGS: NOT AT ALL
SUM OF ALL RESPONSES TO PHQ QUESTIONS 1-9: 0

## 2025-05-17 ASSESSMENT — LIFESTYLE VARIABLES
HOW MANY STANDARD DRINKS CONTAINING ALCOHOL DO YOU HAVE ON A TYPICAL DAY: PATIENT DOES NOT DRINK
HOW MANY STANDARD DRINKS CONTAINING ALCOHOL DO YOU HAVE ON A TYPICAL DAY: 0
HOW OFTEN DO YOU HAVE A DRINK CONTAINING ALCOHOL: NEVER
HOW OFTEN DO YOU HAVE SIX OR MORE DRINKS ON ONE OCCASION: 1
HOW OFTEN DO YOU HAVE A DRINK CONTAINING ALCOHOL: 1

## 2025-05-19 DIAGNOSIS — E55.9 VITAMIN D DEFICIENCY: ICD-10-CM

## 2025-05-19 DIAGNOSIS — E78.5 HYPERLIPIDEMIA, UNSPECIFIED HYPERLIPIDEMIA TYPE: ICD-10-CM

## 2025-05-19 DIAGNOSIS — E11.9 CONTROLLED TYPE 2 DIABETES MELLITUS WITHOUT COMPLICATION, WITHOUT LONG-TERM CURRENT USE OF INSULIN (HCC): ICD-10-CM

## 2025-05-20 ENCOUNTER — OFFICE VISIT (OUTPATIENT)
Facility: CLINIC | Age: 75
End: 2025-05-20
Payer: MEDICARE

## 2025-05-20 VITALS
OXYGEN SATURATION: 96 % | WEIGHT: 132 LBS | BODY MASS INDEX: 23.39 KG/M2 | TEMPERATURE: 98.3 F | SYSTOLIC BLOOD PRESSURE: 135 MMHG | HEART RATE: 91 BPM | HEIGHT: 63 IN | DIASTOLIC BLOOD PRESSURE: 64 MMHG | RESPIRATION RATE: 16 BRPM

## 2025-05-20 DIAGNOSIS — E11.9 CONTROLLED TYPE 2 DIABETES MELLITUS WITHOUT COMPLICATION, WITHOUT LONG-TERM CURRENT USE OF INSULIN (HCC): ICD-10-CM

## 2025-05-20 DIAGNOSIS — E78.5 HYPERLIPIDEMIA, UNSPECIFIED HYPERLIPIDEMIA TYPE: ICD-10-CM

## 2025-05-20 DIAGNOSIS — J47.9 BRONCHIECTASIS WITHOUT COMPLICATION (HCC): ICD-10-CM

## 2025-05-20 DIAGNOSIS — E04.2 MULTINODULAR GOITER: ICD-10-CM

## 2025-05-20 DIAGNOSIS — M85.80 OSTEOPENIA, UNSPECIFIED LOCATION: ICD-10-CM

## 2025-05-20 DIAGNOSIS — K21.9 GASTROESOPHAGEAL REFLUX DISEASE WITHOUT ESOPHAGITIS: ICD-10-CM

## 2025-05-20 DIAGNOSIS — Z71.89 ADVANCED CARE PLANNING/COUNSELING DISCUSSION: Primary | ICD-10-CM

## 2025-05-20 DIAGNOSIS — Z85.3 HISTORY OF BREAST CANCER: ICD-10-CM

## 2025-05-20 DIAGNOSIS — Z00.00 INITIAL MEDICARE ANNUAL WELLNESS VISIT: ICD-10-CM

## 2025-05-20 LAB — HBA1C MFR BLD: 6.3 %

## 2025-05-20 PROCEDURE — 3044F HG A1C LEVEL LT 7.0%: CPT | Performed by: INTERNAL MEDICINE

## 2025-05-20 PROCEDURE — 99497 ADVNCD CARE PLAN 30 MIN: CPT | Performed by: INTERNAL MEDICINE

## 2025-05-20 PROCEDURE — G0402 INITIAL PREVENTIVE EXAM: HCPCS | Performed by: INTERNAL MEDICINE

## 2025-05-20 PROCEDURE — 1159F MED LIST DOCD IN RCRD: CPT | Performed by: INTERNAL MEDICINE

## 2025-05-20 PROCEDURE — 83036 HEMOGLOBIN GLYCOSYLATED A1C: CPT | Performed by: INTERNAL MEDICINE

## 2025-05-20 PROCEDURE — 99214 OFFICE O/P EST MOD 30 MIN: CPT | Performed by: INTERNAL MEDICINE

## 2025-05-20 PROCEDURE — 1123F ACP DISCUSS/DSCN MKR DOCD: CPT | Performed by: INTERNAL MEDICINE

## 2025-05-20 RX ORDER — EZETIMIBE 10 MG/1
10 TABLET ORAL DAILY
Qty: 90 TABLET | Refills: 1 | Status: SHIPPED | OUTPATIENT
Start: 2025-05-20

## 2025-05-21 NOTE — ACP (ADVANCE CARE PLANNING)
Advance Care Planning     Advance Care Planning (ACP) Physician/NP/PA Conversation    Date of Conversation: 5/20/2025  Conducted with: Patient with Decision Making Capacity    Healthcare Decision Maker:      Primary Decision Maker: Kwan Brewster - Spouse - [phone]    Click here to complete Healthcare Decision Makers including selection of the Healthcare Decision Maker Relationship (ie \"Primary\")  Today we documented Decision Maker(s) consistent with Legal Next of Kin hierarchy.    Care Preferences:    Hospitalization:  \"If your health worsens and it becomes clear that your chance of recovery is unlikely, what would be your preference regarding hospitalization?\"  The patient would prefer hospitalization.    Ventilation:  \"If you were unable to breath on your own and your chance of recovery was unlikely, what would be your preference about the use of a ventilator (breathing machine) if it was available to you?\"  The patient would desire the use of a ventilator.    Resuscitation:  \"In the event your heart stopped as a result of an underlying serious health condition, would you want attempts made to restart your heart, or would you prefer a natural death?\"  Yes, attempt to resuscitate.    ventilation preferences, hospitalization preferences, and resuscitation preferences    Conversation Outcomes / Follow-Up Plan:  ACP in process - information provided, considering goals and options  Reviewed DNR/DNI and patient elects Full Code (Attempt Resuscitation)    Length of Voluntary ACP Conversation in minutes:  16 minutes    INDRA VALLEJO MD

## 2025-05-21 NOTE — PATIENT INSTRUCTIONS
pressure, or a strange feeling in the chest.     Sweating.     Shortness of breath.     Pain, pressure, or a strange feeling in the back, neck, jaw, or upper belly or in one or both shoulders or arms.     Lightheadedness or sudden weakness.     A fast or irregular heartbeat.   After you call 911, the  may tell you to chew 1 adult-strength or 2 to 4 low-dose aspirin. Wait for an ambulance. Do not try to drive yourself.  Watch closely for changes in your health, and be sure to contact your doctor if you have any problems.  Where can you learn more?  Go to https://www.[employer]/patientEd and enter F075 to learn more about \"A Healthy Heart: Care Instructions.\"  Current as of: July 31, 2024  Content Version: 14.4  © 3910-4238 [email or web address].   Care instructions adapted under license by [employer]. If you have questions about a medical condition or this instruction, always ask your healthcare professional. [employer], disclaims any warranty or liability for your use of this information.    Personalized Preventive Plan for Odalis Brewster - 5/20/2025  Medicare offers a range of preventive health benefits. Some of the tests and screenings are paid in full while other may be subject to a deductible, co-insurance, and/or copay.  Some of these benefits include a comprehensive review of your medical history including lifestyle, illnesses that may run in your family, and various assessments and screenings as appropriate.  After reviewing your medical record and screening and assessments performed today your provider may have ordered immunizations, labs, imaging, and/or referrals for you.  A list of these orders (if applicable) as well as your Preventive Care list are included within your After Visit Summary for your review.

## 2025-05-21 NOTE — PROGRESS NOTES
Medicare Annual Wellness Visit    Odalis Brewster is here for Medicare AWV    Assessment & Plan   Controlled type 2 diabetes mellitus without complication, without long-term current use of insulin (HCC)  -     AMB POC HEMOGLOBIN A1C  -     HEMOGLOBIN A1C W/O EAG; Future  -     Comprehensive Metabolic Panel; Future  Advanced care planning/counseling discussion  Bronchiectasis without complication (HCC)  Gastroesophageal reflux disease without esophagitis  Multinodular goiter  History of breast cancer  Hyperlipidemia, unspecified hyperlipidemia type  -     ezetimibe (ZETIA) 10 MG tablet; Take 1 tablet by mouth daily, Disp-90 tablet, R-1Normal  -     Lipid Panel; Future  Osteopenia, unspecified location  Initial Medicare annual wellness visit       Return in 6 months (on 11/20/2025).     Subjective       Patient's complete Health Risk Assessment and screening values have been reviewed and are found in Flowsheets. The following problems were reviewed today and where indicated follow up appointments were made and/or referrals ordered.    Positive Risk Factor Screenings with Interventions:                   Vision Screen:  Visual Acuity screen is abnormal due to a score of 20/25 or worse.  Interventions:   Patient declines any further evaluation or treatment    Safety:  Do you have non-slip mats or non-slip surfaces or shower bars or grab bars in your shower or bathtub?: (!) (Patient-Rptd) No  Interventions:  Patient declined any further interventions or treatment     Advanced Directives:  Do you have a Living Will?: (!) (Patient-Rptd) No    Intervention:  see ACP note           Pt is sexually active          Objective   Vision Screening    Right eye Left eye Both eyes   Without correction 20/30 20/50 20/25   With correction         Vitals:    05/20/25 0856   BP: 135/64   Pulse: 91   Resp: 16   Temp: 98.3 °F (36.8 °C)   TempSrc: Temporal   SpO2: 96%   Weight: 59.9 kg (132 lb)   Height: 1.6 m (5' 3\")      Body 
Odalis Brewster presents today for   Chief Complaint   Patient presents with    Medicare AWV       \"Have you been to the ER, urgent care clinic since your last visit?  Hospitalized since your last visit?\"    NO    “Have you seen or consulted any other health care providers outside of [facility] since your last visit?”    NO             
Bronchiectasis without complication (HCC)        4. Gastroesophageal reflux disease without esophagitis        5. Multinodular goiter        6. History of breast cancer        7. Hyperlipidemia, unspecified hyperlipidemia type  ezetimibe (ZETIA) 10 MG tablet    Lipid Panel      8. Osteopenia, unspecified location

## 2025-05-29 ENCOUNTER — OFFICE VISIT (OUTPATIENT)
Age: 75
End: 2025-05-29
Payer: MEDICARE

## 2025-05-29 DIAGNOSIS — M16.12 PRIMARY OSTEOARTHRITIS OF LEFT HIP: ICD-10-CM

## 2025-05-29 DIAGNOSIS — R20.0 LOWER EXTREMITY NUMBNESS: Primary | ICD-10-CM

## 2025-05-29 PROCEDURE — 1159F MED LIST DOCD IN RCRD: CPT | Performed by: ORTHOPAEDIC SURGERY

## 2025-05-29 PROCEDURE — 99214 OFFICE O/P EST MOD 30 MIN: CPT | Performed by: ORTHOPAEDIC SURGERY

## 2025-05-29 PROCEDURE — 1125F AMNT PAIN NOTED PAIN PRSNT: CPT | Performed by: ORTHOPAEDIC SURGERY

## 2025-05-29 PROCEDURE — 1123F ACP DISCUSS/DSCN MKR DOCD: CPT | Performed by: ORTHOPAEDIC SURGERY

## 2025-05-29 RX ORDER — DICLOFENAC EPOLAMINE 0.01 G/1
1 SYSTEM TOPICAL 2 TIMES DAILY
Qty: 30 PATCH | Refills: 1 | Status: SHIPPED | OUTPATIENT
Start: 2025-05-29

## 2025-05-29 NOTE — PROGRESS NOTES
Patient: Odalis Brewster                MRN: [id number]       SSN: [id number]  YOB: 1950        AGE: 74 y.o.        SEX: female  There is no height or weight on file to calculate BMI.    PCP: Ruel Freed MD  05/29/25    Odalis is here follow-up assessment hip pain back pain with radiculopathy she was seeing Dr. Neal who is on sabbatical and would like to follow-up with someone she has been perplexed in the past because the MRI shows a pinched nerve on the right side but her radiculopathy is on the left I did examine her today little bit of arthritis involving the left hip although not particularly symptomatic mild tenderness over the trochanter I will prescribe some Mobic Flector pain patches with usual precautions for her and detailed neurological exam confirms decree sensation to L4-5 on the left side compared to the right MRI review shows a disc herniation on the right I am wondering if she has a neuropathy secondary to her diabetes and we will get a neuro consult and also a EMG nerve conduction study at the same time with regards to the hip no surgery is indicated currently I have offered an injection she would like to try with the pain patches for the time being and we will see her back in a few weeks time to review the neuro consult will get her to see Dr. Sadia Villanueva at the spine center or Dr. Bruno and review the EMG nerve conduction results we could consider repeat injection for the left hip as well it is up to her    REVIEW OF SYSTEMS:      CON: negative  EYE: negative   ENT: negative  RESP: negative  GI:    negative   :  negative  MSK: Positive  A twelve point review of systems was completed, positives noted and all other systems were reviewed and are negative          Past Medical History:   Diagnosis Date    ADD (attention deficit disorder) 2014    [facility] psych    Amebic colitis 1970s    Anemia, iron deficiency 05/2018    eval Dr Mcgill showed gastritis

## 2025-06-18 ENCOUNTER — TELEPHONE (OUTPATIENT)
Facility: CLINIC | Age: 75
End: 2025-06-18

## 2025-06-18 DIAGNOSIS — F41.9 ANXIETY: Primary | ICD-10-CM

## 2025-06-18 RX ORDER — DIAZEPAM 5 MG/1
5 TABLET ORAL EVERY 12 HOURS PRN
Qty: 40 TABLET | Refills: 0 | Status: SHIPPED | OUTPATIENT
Start: 2025-06-18 | End: 2025-07-08

## 2025-06-18 NOTE — TELEPHONE ENCOUNTER
Refill request via fax     Medication: diazePAM (VALIUM) 5 MG tablet    Quantity: 40  Pharmacy: [facility]/PHARMACY #[id number] [facility] [address] [phone] - F [phone] [[id number]]   Last Fill: 05/15/2025    PCP: Ruel Freed MD    LAST OFFICE VISIT: 05/20/2025      Future Appointments   Date Time Provider Department Center   7/1/2025 11:20 AM Ruel Freed MD [facility] DEP   11/12/2025  8:45 AM IOC LAB VISIT [facility] DEP   11/19/2025 11:00 AM Ruel Freed MD [facility] DEP

## 2025-07-01 ENCOUNTER — OFFICE VISIT (OUTPATIENT)
Facility: CLINIC | Age: 75
End: 2025-07-01
Payer: MEDICARE

## 2025-07-01 VITALS
TEMPERATURE: 98.2 F | OXYGEN SATURATION: 98 % | SYSTOLIC BLOOD PRESSURE: 132 MMHG | DIASTOLIC BLOOD PRESSURE: 72 MMHG | RESPIRATION RATE: 16 BRPM | BODY MASS INDEX: 23.92 KG/M2 | HEIGHT: 63 IN | HEART RATE: 70 BPM | WEIGHT: 135 LBS

## 2025-07-01 DIAGNOSIS — Z01.818 PREOP EXAM FOR INTERNAL MEDICINE: Primary | ICD-10-CM

## 2025-07-01 DIAGNOSIS — H25.9 SENILE CATARACT OF LEFT EYE, UNSPECIFIED AGE-RELATED CATARACT TYPE: ICD-10-CM

## 2025-07-01 DIAGNOSIS — E11.9 CONTROLLED TYPE 2 DIABETES MELLITUS WITHOUT COMPLICATION, WITHOUT LONG-TERM CURRENT USE OF INSULIN (HCC): ICD-10-CM

## 2025-07-01 DIAGNOSIS — J47.9 BRONCHIECTASIS WITHOUT COMPLICATION (HCC): ICD-10-CM

## 2025-07-01 PROCEDURE — 1159F MED LIST DOCD IN RCRD: CPT | Performed by: INTERNAL MEDICINE

## 2025-07-01 PROCEDURE — 3044F HG A1C LEVEL LT 7.0%: CPT | Performed by: INTERNAL MEDICINE

## 2025-07-01 PROCEDURE — 1123F ACP DISCUSS/DSCN MKR DOCD: CPT | Performed by: INTERNAL MEDICINE

## 2025-07-01 PROCEDURE — 1126F AMNT PAIN NOTED NONE PRSNT: CPT | Performed by: INTERNAL MEDICINE

## 2025-07-01 PROCEDURE — 99214 OFFICE O/P EST MOD 30 MIN: CPT | Performed by: INTERNAL MEDICINE

## 2025-07-01 NOTE — PROGRESS NOTES
Odalis Brewster presents today for   Chief Complaint   Patient presents with    Pre-op Exam       \"Have you been to the ER, urgent care clinic since your last visit?  Hospitalized since your last visit?\"    NO    “Have you seen or consulted any other health care providers outside of [facility] since your last visit?”    NO             
Cigarettes     Quit date: 1973     Years since quittin.9     Passive exposure: Past    Smokeless tobacco: Never   Substance and Sexual Activity    Alcohol use: No    Drug use: No    Sexual activity: Yes     Partners: Male   Other Topics Concern    Not on file   Social History Narrative    Not on file     Social Drivers of Health     Financial Resource Strain: Patient Declined (2024)    Overall Financial Resource Strain (CARDIA)     Difficulty of Paying Living Expenses: Patient declined   Food Insecurity: No Food Insecurity (2025)    Hunger Vital Sign     Worried About Running Out of Food in the Last Year: Never true     Ran Out of Food in the Last Year: Never true   Transportation Needs: No Transportation Needs (2025)    PRAPARE - Transportation     Lack of Transportation (Medical): No     Lack of Transportation (Non-Medical): No   Physical Activity: Insufficiently Active (2025)    Exercise Vital Sign     Days of Exercise per Week: 4 days     Minutes of Exercise per Session: 30 min   Stress: Not on file   Social Connections: Not on file   Intimate Partner Violence: Not on file   Housing Stability: High Risk (2025)    Housing Stability Vital Sign     Unable to Pay for Housing in the Last Year: Yes     Number of Times Moved in the Last Year: 0     Homeless in the Last Year: No     Current Outpatient Medications   Medication Sig    diazePAM (VALIUM) 5 MG tablet Take 1 tablet by mouth every 12 hours as needed for Anxiety for up to 20 days. Max Daily Amount: 10 mg    diclofenac sodium (VOLTAREN) 1 % GEL Apply 4 g topically 4 times daily as needed for Pain    ezetimibe (ZETIA) 10 MG tablet Take 1 tablet by mouth daily    pravastatin (PRAVACHOL) 40 MG tablet Take 1 tablet by mouth nightly    albuterol sulfate HFA (PROVENTIL;VENTOLIN;PROAIR) 108 (90 Base) MCG/ACT inhaler Inhale 2 puffs into the lungs every 6 hours as needed for Wheezing    Semaglutide,0.25 or 0.5MG/DOS, (OZEMPIC, 0.25 OR

## 2025-07-03 RX ORDER — SEMAGLUTIDE 0.68 MG/ML
0.5 INJECTION, SOLUTION SUBCUTANEOUS
Qty: 3 ML | Refills: 11 | Status: SHIPPED | OUTPATIENT
Start: 2025-07-03

## 2025-07-23 DIAGNOSIS — F41.9 ANXIETY: Primary | ICD-10-CM

## 2025-07-23 RX ORDER — DIAZEPAM 5 MG/1
5 TABLET ORAL EVERY 12 HOURS PRN
Qty: 40 TABLET | Refills: 0 | Status: SHIPPED | OUTPATIENT
Start: 2025-07-23 | End: 2025-08-22

## 2025-07-23 NOTE — TELEPHONE ENCOUNTER
[facility]  report reviewed    The last fill date for Diazepam was 06/18/2025 for a 20 d/s with qty 40    Last UDS: Not on file   CSA Last signed: 05/14/2024     PCP: Ruel Freed MD  Last appointment: 07/01/2025  Future Appointments   Date Time Provider Department Center   11/12/2025  8:45 AM IOC LAB VISIT [facility] DEP   11/19/2025 11:00 AM Ruel Freed MD [facility] DEP       Requested Prescriptions     Pending Prescriptions Disp Refills    diazePAM (VALIUM) 5 MG tablet 40 tablet 0     Sig: Take 1 tablet by mouth every 12 hours as needed for Anxiety for up to 30 days. Max Daily Amount: 10 mg

## 2025-07-23 NOTE — TELEPHONE ENCOUNTER
PCP: Ruel Freed MD    Refill Request     LAST OFFICE VISIT: 07/01/25      Medication:   diazePAM (VALIUM) 5 MG tablet     Dispense:   Take 1 tablet by mouth every 12 hours as needed for Anxiety for up to 20 days. Max Daily Amount: 10 mg     Pharmacy [facility]/PHARMACY #[id number] [place] [id number] [facility] [phone] - F [phone] [[id number]]       Future Appointments   Date Time Provider Department Center   11/12/2025  8:45 AM IOC LAB VISIT [facility] ECC DEP   11/19/2025 11:00 AM Ruel Freed MD [facility] DEP

## 2025-09-02 ENCOUNTER — SCHEDULED TELEPHONE ENCOUNTER (OUTPATIENT)
Age: 75
End: 2025-09-02
Payer: MEDICARE

## 2025-09-02 DIAGNOSIS — R20.0 LOWER EXTREMITY NUMBNESS: Primary | ICD-10-CM

## 2025-09-02 DIAGNOSIS — M47.816 LUMBAR SPONDYLOSIS: ICD-10-CM

## 2025-09-02 DIAGNOSIS — M51.27 PROTRUSION OF INTERVERTEBRAL DISC OF LUMBOSACRAL REGION: ICD-10-CM

## 2025-09-02 PROCEDURE — 99214 OFFICE O/P EST MOD 30 MIN: CPT | Performed by: NURSE PRACTITIONER

## 2025-09-02 RX ORDER — PREGABALIN 75 MG/1
75 CAPSULE ORAL
Qty: 30 CAPSULE | Refills: 2 | Status: SHIPPED | OUTPATIENT
Start: 2025-09-02 | End: 2025-12-01

## 2025-09-02 RX ORDER — DIAZEPAM 5 MG/1
TABLET ORAL
COMMUNITY
Start: 2025-05-15

## 2025-09-05 ENCOUNTER — TELEPHONE (OUTPATIENT)
Facility: CLINIC | Age: 75
End: 2025-09-05

## 2025-09-05 DIAGNOSIS — F41.9 ANXIETY: Primary | ICD-10-CM

## 2025-09-05 DIAGNOSIS — R39.89 BLADDER PAIN: Primary | ICD-10-CM

## 2025-09-05 RX ORDER — DIAZEPAM 5 MG/1
5 TABLET ORAL EVERY 12 HOURS PRN
Qty: 40 TABLET | Refills: 0 | Status: SHIPPED | OUTPATIENT
Start: 2025-09-05 | End: 2025-10-05